# Patient Record
Sex: FEMALE | Race: WHITE | NOT HISPANIC OR LATINO | Employment: FULL TIME | URBAN - METROPOLITAN AREA
[De-identification: names, ages, dates, MRNs, and addresses within clinical notes are randomized per-mention and may not be internally consistent; named-entity substitution may affect disease eponyms.]

---

## 2017-01-03 ENCOUNTER — ANESTHESIA (OUTPATIENT)
Dept: PERIOP | Facility: HOSPITAL | Age: 52
End: 2017-01-03
Payer: COMMERCIAL

## 2017-01-03 ENCOUNTER — HOSPITAL ENCOUNTER (OUTPATIENT)
Facility: HOSPITAL | Age: 52
Setting detail: OUTPATIENT SURGERY
Discharge: HOME | End: 2017-01-03
Attending: SURGERY | Admitting: SURGERY
Payer: COMMERCIAL

## 2017-01-03 ENCOUNTER — HOSPITAL ENCOUNTER (OUTPATIENT)
Dept: NUCLEAR MEDICINE | Facility: HOSPITAL | Age: 52
Discharge: HOME | End: 2017-01-03
Attending: SURGERY
Payer: COMMERCIAL

## 2017-01-03 ENCOUNTER — HOSPITAL ENCOUNTER (OUTPATIENT)
Dept: MAMMOGRAPHY | Facility: HOSPITAL | Age: 52
Discharge: HOME | End: 2017-01-03
Attending: SURGERY
Payer: COMMERCIAL

## 2017-01-03 ENCOUNTER — CONVERSION ENCOUNTER (OUTPATIENT)
Dept: MAMMOGRAPHY | Facility: HOSPITAL | Age: 52
End: 2017-01-03

## 2017-01-03 ENCOUNTER — HOSPITAL ENCOUNTER (OUTPATIENT)
Dept: MAMMOGRAPHY | Facility: HOSPITAL | Age: 52
Setting detail: OUTPATIENT SURGERY
Discharge: HOME | End: 2017-01-03
Payer: COMMERCIAL

## 2017-01-03 VITALS
TEMPERATURE: 97.6 F | RESPIRATION RATE: 16 BRPM | WEIGHT: 119.25 LBS | HEIGHT: 66 IN | OXYGEN SATURATION: 100 % | BODY MASS INDEX: 19.16 KG/M2 | DIASTOLIC BLOOD PRESSURE: 74 MMHG | SYSTOLIC BLOOD PRESSURE: 114 MMHG | HEART RATE: 77 BPM

## 2017-01-03 DIAGNOSIS — C50.412 BILATERAL MALIGNANT NEOPLASM OF UPPER OUTER QUADRANT OF BREAST IN FEMALE (HCC): ICD-10-CM

## 2017-01-03 DIAGNOSIS — C50.412 MALIGNANT NEOPLASM OF UPPER-OUTER QUADRANT OF LEFT FEMALE BREAST (HCC): ICD-10-CM

## 2017-01-03 DIAGNOSIS — C50.411 BILATERAL MALIGNANT NEOPLASM OF UPPER OUTER QUADRANT OF BREAST IN FEMALE (HCC): ICD-10-CM

## 2017-01-03 PROBLEM — C50.419 MALIGNANT NEOPLASM OF UPPER-OUTER QUADRANT OF FEMALE BREAST (HCC): Status: ACTIVE | Noted: 2017-01-03

## 2017-01-03 LAB — EXT PREGNANCY TEST URINE: NEGATIVE

## 2017-01-03 PROCEDURE — 88307 TISSUE EXAM BY PATHOLOGIST: CPT | Performed by: SURGERY

## 2017-01-03 PROCEDURE — 88342 IMHCHEM/IMCYTCHM 1ST ANTB: CPT | Performed by: SURGERY

## 2017-01-03 PROCEDURE — 19281 PERQ DEVICE BREAST 1ST IMAG: CPT

## 2017-01-03 PROCEDURE — 88341 IMHCHEM/IMCYTCHM EA ADD ANTB: CPT | Performed by: SURGERY

## 2017-01-03 PROCEDURE — 81025 URINE PREGNANCY TEST: CPT | Performed by: ANESTHESIOLOGY

## 2017-01-03 PROCEDURE — 78195 LYMPH SYSTEM IMAGING: CPT

## 2017-01-03 PROCEDURE — A9541 TC99M SULFUR COLLOID: HCPCS

## 2017-01-03 RX ORDER — ONDANSETRON 2 MG/ML
4 INJECTION INTRAMUSCULAR; INTRAVENOUS ONCE
Status: DISCONTINUED | OUTPATIENT
Start: 2017-01-03 | End: 2017-01-03 | Stop reason: HOSPADM

## 2017-01-03 RX ORDER — SODIUM CHLORIDE 9 MG/ML
125 INJECTION, SOLUTION INTRAVENOUS CONTINUOUS
Status: DISCONTINUED | OUTPATIENT
Start: 2017-01-03 | End: 2017-01-03 | Stop reason: HOSPADM

## 2017-01-03 RX ORDER — ISOSULFAN BLUE 50 MG/5ML
INJECTION, SOLUTION SUBCUTANEOUS AS NEEDED
Status: DISCONTINUED | OUTPATIENT
Start: 2017-01-03 | End: 2017-01-03 | Stop reason: HOSPADM

## 2017-01-03 RX ORDER — MORPHINE SULFATE 2 MG/ML
2 INJECTION, SOLUTION INTRAMUSCULAR; INTRAVENOUS
Status: DISCONTINUED | OUTPATIENT
Start: 2017-01-03 | End: 2017-01-03 | Stop reason: HOSPADM

## 2017-01-03 RX ORDER — LIDOCAINE WITH 8.4% SOD BICARB 0.9%(10ML)
5 SYRINGE (ML) INJECTION ONCE
Status: DISCONTINUED | OUTPATIENT
Start: 2017-01-03 | End: 2017-01-04 | Stop reason: HOSPADM

## 2017-01-03 RX ORDER — FENTANYL CITRATE/PF 50 MCG/ML
50 SYRINGE (ML) INJECTION
Status: DISCONTINUED | OUTPATIENT
Start: 2017-01-03 | End: 2017-01-03 | Stop reason: HOSPADM

## 2017-01-03 RX ORDER — OXYCODONE HYDROCHLORIDE AND ACETAMINOPHEN 5; 325 MG/1; MG/1
1 TABLET ORAL EVERY 4 HOURS PRN
Status: DISCONTINUED | OUTPATIENT
Start: 2017-01-03 | End: 2017-01-03 | Stop reason: HOSPADM

## 2017-01-03 RX ORDER — DEXAMETHASONE SODIUM PHOSPHATE 4 MG/ML
INJECTION, SOLUTION INTRA-ARTICULAR; INTRALESIONAL; INTRAMUSCULAR; INTRAVENOUS; SOFT TISSUE AS NEEDED
Status: DISCONTINUED | OUTPATIENT
Start: 2017-01-03 | End: 2017-01-03 | Stop reason: SURG

## 2017-01-03 RX ORDER — BUPIVACAINE HYDROCHLORIDE 5 MG/ML
INJECTION, SOLUTION PERINEURAL AS NEEDED
Status: DISCONTINUED | OUTPATIENT
Start: 2017-01-03 | End: 2017-01-03 | Stop reason: HOSPADM

## 2017-01-03 RX ORDER — PROPOFOL 10 MG/ML
INJECTION, EMULSION INTRAVENOUS AS NEEDED
Status: DISCONTINUED | OUTPATIENT
Start: 2017-01-03 | End: 2017-01-03 | Stop reason: SURG

## 2017-01-03 RX ORDER — ROCURONIUM BROMIDE 10 MG/ML
INJECTION, SOLUTION INTRAVENOUS AS NEEDED
Status: DISCONTINUED | OUTPATIENT
Start: 2017-01-03 | End: 2017-01-03 | Stop reason: SURG

## 2017-01-03 RX ORDER — FENTANYL CITRATE 50 UG/ML
INJECTION, SOLUTION INTRAMUSCULAR; INTRAVENOUS AS NEEDED
Status: DISCONTINUED | OUTPATIENT
Start: 2017-01-03 | End: 2017-01-03 | Stop reason: SURG

## 2017-01-03 RX ORDER — MIDAZOLAM HYDROCHLORIDE 1 MG/ML
INJECTION INTRAMUSCULAR; INTRAVENOUS AS NEEDED
Status: DISCONTINUED | OUTPATIENT
Start: 2017-01-03 | End: 2017-01-03 | Stop reason: SURG

## 2017-01-03 RX ORDER — GLYCOPYRROLATE 0.2 MG/ML
INJECTION INTRAMUSCULAR; INTRAVENOUS AS NEEDED
Status: DISCONTINUED | OUTPATIENT
Start: 2017-01-03 | End: 2017-01-03 | Stop reason: SURG

## 2017-01-03 RX ORDER — IBUPROFEN 600 MG/1
600 TABLET ORAL EVERY 6 HOURS PRN
Status: DISCONTINUED | OUTPATIENT
Start: 2017-01-03 | End: 2017-01-03 | Stop reason: HOSPADM

## 2017-01-03 RX ORDER — ONDANSETRON 2 MG/ML
INJECTION INTRAMUSCULAR; INTRAVENOUS AS NEEDED
Status: DISCONTINUED | OUTPATIENT
Start: 2017-01-03 | End: 2017-01-03 | Stop reason: SURG

## 2017-01-03 RX ADMIN — DEXAMETHASONE SODIUM PHOSPHATE 4 MG: 4 INJECTION, SOLUTION INTRAMUSCULAR; INTRAVENOUS at 12:08

## 2017-01-03 RX ADMIN — FENTANYL CITRATE 50 MCG: 50 INJECTION, SOLUTION INTRAMUSCULAR; INTRAVENOUS at 11:57

## 2017-01-03 RX ADMIN — PROPOFOL 200 MG: 10 INJECTION, EMULSION INTRAVENOUS at 11:57

## 2017-01-03 RX ADMIN — FENTANYL CITRATE 50 MCG: 50 INJECTION, SOLUTION INTRAMUSCULAR; INTRAVENOUS at 13:15

## 2017-01-03 RX ADMIN — FENTANYL CITRATE 100 MCG: 50 INJECTION, SOLUTION INTRAMUSCULAR; INTRAVENOUS at 11:54

## 2017-01-03 RX ADMIN — CEFAZOLIN SODIUM 1000 MG: 1 SOLUTION INTRAVENOUS at 12:04

## 2017-01-03 RX ADMIN — NEOSTIGMINE METHYLSULFATE 3 MG: 1 INJECTION INTRAMUSCULAR; INTRAVENOUS; SUBCUTANEOUS at 13:08

## 2017-01-03 RX ADMIN — FENTANYL CITRATE 50 MCG: 50 INJECTION, SOLUTION INTRAMUSCULAR; INTRAVENOUS at 12:55

## 2017-01-03 RX ADMIN — TRIMETHOBENZAMIDE HYDROCHLORIDE 200 MG: 100 INJECTION INTRAMUSCULAR at 14:58

## 2017-01-03 RX ADMIN — ROCURONIUM BROMIDE 30 MG: 10 INJECTION, SOLUTION INTRAVENOUS at 11:57

## 2017-01-03 RX ADMIN — LIDOCAINE HYDROCHLORIDE 40 MG: 20 INJECTION, SOLUTION INTRAVENOUS at 11:57

## 2017-01-03 RX ADMIN — SODIUM CHLORIDE 125 ML/HR: 0.9 INJECTION, SOLUTION INTRAVENOUS at 08:47

## 2017-01-03 RX ADMIN — MIDAZOLAM HYDROCHLORIDE 2 MG: 1 INJECTION, SOLUTION INTRAMUSCULAR; INTRAVENOUS at 11:54

## 2017-01-03 RX ADMIN — GLYCOPYRROLATE 0.6 MG: 0.2 INJECTION, SOLUTION INTRAMUSCULAR; INTRAVENOUS at 13:08

## 2017-01-03 RX ADMIN — IBUPROFEN 600 MG: 600 TABLET ORAL at 15:49

## 2017-01-03 RX ADMIN — ONDANSETRON HYDROCHLORIDE 4 MG: 2 INJECTION, SOLUTION INTRAVENOUS at 12:08

## 2017-01-03 RX ADMIN — SODIUM CHLORIDE: 0.9 INJECTION, SOLUTION INTRAVENOUS at 12:35

## 2017-01-03 RX ADMIN — FENTANYL CITRATE 50 MCG: 50 INJECTION INTRAMUSCULAR; INTRAVENOUS at 13:37

## 2017-01-04 ENCOUNTER — GENERIC CONVERSION - ENCOUNTER (OUTPATIENT)
Dept: OTHER | Facility: OTHER | Age: 52
End: 2017-01-04

## 2017-01-06 ENCOUNTER — GENERIC CONVERSION - ENCOUNTER (OUTPATIENT)
Dept: OTHER | Facility: OTHER | Age: 52
End: 2017-01-06

## 2017-01-11 ENCOUNTER — GENERIC CONVERSION - ENCOUNTER (OUTPATIENT)
Dept: OTHER | Facility: OTHER | Age: 52
End: 2017-01-11

## 2017-01-17 ENCOUNTER — ALLSCRIPTS OFFICE VISIT (OUTPATIENT)
Dept: OTHER | Facility: OTHER | Age: 52
End: 2017-01-17

## 2017-01-18 ENCOUNTER — GENERIC CONVERSION - ENCOUNTER (OUTPATIENT)
Dept: OTHER | Facility: OTHER | Age: 52
End: 2017-01-18

## 2017-01-26 ENCOUNTER — GENERIC CONVERSION - ENCOUNTER (OUTPATIENT)
Dept: OTHER | Facility: OTHER | Age: 52
End: 2017-01-26

## 2017-01-26 ENCOUNTER — APPOINTMENT (OUTPATIENT)
Dept: RADIATION ONCOLOGY | Facility: HOSPITAL | Age: 52
End: 2017-01-26
Payer: COMMERCIAL

## 2017-01-26 ENCOUNTER — ALLSCRIPTS OFFICE VISIT (OUTPATIENT)
Dept: OTHER | Facility: OTHER | Age: 52
End: 2017-01-26

## 2017-01-26 PROCEDURE — 99214 OFFICE O/P EST MOD 30 MIN: CPT | Performed by: RADIOLOGY

## 2017-02-17 ENCOUNTER — ALLSCRIPTS OFFICE VISIT (OUTPATIENT)
Dept: OTHER | Facility: OTHER | Age: 52
End: 2017-02-17

## 2017-02-22 ENCOUNTER — APPOINTMENT (OUTPATIENT)
Dept: LAB | Facility: HOSPITAL | Age: 52
End: 2017-02-22
Attending: RADIOLOGY
Payer: COMMERCIAL

## 2017-02-22 ENCOUNTER — TRANSCRIBE ORDERS (OUTPATIENT)
Dept: LAB | Facility: HOSPITAL | Age: 52
End: 2017-02-22

## 2017-02-22 DIAGNOSIS — C50.412 MALIGNANT NEOPLASM OF UPPER-OUTER QUADRANT OF LEFT FEMALE BREAST (HCC): Primary | ICD-10-CM

## 2017-02-22 DIAGNOSIS — C50.412 MALIGNANT NEOPLASM OF UPPER-OUTER QUADRANT OF LEFT FEMALE BREAST (HCC): ICD-10-CM

## 2017-02-22 LAB — HCG SERPL QL: NEGATIVE

## 2017-02-22 PROCEDURE — 84703 CHORIONIC GONADOTROPIN ASSAY: CPT

## 2017-02-22 PROCEDURE — 36415 COLL VENOUS BLD VENIPUNCTURE: CPT

## 2017-03-01 ENCOUNTER — GENERIC CONVERSION - ENCOUNTER (OUTPATIENT)
Dept: OTHER | Facility: OTHER | Age: 52
End: 2017-03-01

## 2017-03-01 ENCOUNTER — APPOINTMENT (OUTPATIENT)
Dept: RADIATION ONCOLOGY | Facility: HOSPITAL | Age: 52
End: 2017-03-01
Attending: RADIOLOGY
Payer: COMMERCIAL

## 2017-03-01 PROCEDURE — 77332 RADIATION TREATMENT AID(S): CPT | Performed by: RADIOLOGY

## 2017-03-01 PROCEDURE — 77334 RADIATION TREATMENT AID(S): CPT | Performed by: RADIOLOGY

## 2017-03-06 ENCOUNTER — APPOINTMENT (OUTPATIENT)
Dept: RADIATION ONCOLOGY | Facility: HOSPITAL | Age: 52
End: 2017-03-06
Payer: COMMERCIAL

## 2017-03-06 PROCEDURE — 77334 RADIATION TREATMENT AID(S): CPT | Performed by: RADIOLOGY

## 2017-03-06 PROCEDURE — 77295 3-D RADIOTHERAPY PLAN: CPT | Performed by: RADIOLOGY

## 2017-03-06 PROCEDURE — 77300 RADIATION THERAPY DOSE PLAN: CPT | Performed by: RADIOLOGY

## 2017-03-09 ENCOUNTER — GENERIC CONVERSION - ENCOUNTER (OUTPATIENT)
Dept: OTHER | Facility: OTHER | Age: 52
End: 2017-03-09

## 2017-03-10 PROCEDURE — 77280 THER RAD SIMULAJ FIELD SMPL: CPT | Performed by: RADIOLOGY

## 2017-03-13 PROCEDURE — 77412 RADIATION TX DELIVERY LVL 3: CPT | Performed by: RADIOLOGY

## 2017-03-13 PROCEDURE — 77331 SPECIAL RADIATION DOSIMETRY: CPT | Performed by: RADIOLOGY

## 2017-03-15 PROCEDURE — 77412 RADIATION TX DELIVERY LVL 3: CPT | Performed by: RADIOLOGY

## 2017-03-16 PROCEDURE — 77412 RADIATION TX DELIVERY LVL 3: CPT | Performed by: RADIOLOGY

## 2017-03-17 PROCEDURE — 77412 RADIATION TX DELIVERY LVL 3: CPT | Performed by: RADIOLOGY

## 2017-03-20 PROCEDURE — 77336 RADIATION PHYSICS CONSULT: CPT | Performed by: RADIOLOGY

## 2017-03-20 PROCEDURE — 77417 THER RADIOLOGY PORT IMAGE(S): CPT | Performed by: RADIOLOGY

## 2017-03-20 PROCEDURE — 77412 RADIATION TX DELIVERY LVL 3: CPT | Performed by: RADIOLOGY

## 2017-03-21 PROCEDURE — 77412 RADIATION TX DELIVERY LVL 3: CPT | Performed by: RADIOLOGY

## 2017-03-22 PROCEDURE — 77412 RADIATION TX DELIVERY LVL 3: CPT | Performed by: RADIOLOGY

## 2017-03-23 PROCEDURE — 77412 RADIATION TX DELIVERY LVL 3: CPT | Performed by: RADIOLOGY

## 2017-03-24 PROCEDURE — 77412 RADIATION TX DELIVERY LVL 3: CPT | Performed by: RADIOLOGY

## 2017-03-27 ENCOUNTER — TRANSCRIBE ORDERS (OUTPATIENT)
Dept: OTHER | Facility: HOSPITAL | Age: 52
End: 2017-03-27

## 2017-03-27 DIAGNOSIS — C50.412 MALIGNANT NEOPLASM OF UPPER-OUTER QUADRANT OF LEFT FEMALE BREAST (HCC): Primary | ICD-10-CM

## 2017-03-27 PROCEDURE — 77412 RADIATION TX DELIVERY LVL 3: CPT | Performed by: RADIOLOGY

## 2017-03-27 PROCEDURE — 77336 RADIATION PHYSICS CONSULT: CPT | Performed by: RADIOLOGY

## 2017-03-28 DIAGNOSIS — C50.412 MALIGNANT NEOPLASM OF UPPER-OUTER QUADRANT OF LEFT FEMALE BREAST (HCC): ICD-10-CM

## 2017-03-28 PROCEDURE — 77417 THER RADIOLOGY PORT IMAGE(S): CPT | Performed by: RADIOLOGY

## 2017-03-28 PROCEDURE — 77412 RADIATION TX DELIVERY LVL 3: CPT | Performed by: RADIOLOGY

## 2017-03-29 ENCOUNTER — APPOINTMENT (OUTPATIENT)
Dept: LAB | Facility: HOSPITAL | Age: 52
End: 2017-03-29
Attending: RADIOLOGY
Payer: COMMERCIAL

## 2017-03-29 DIAGNOSIS — C50.412 MALIGNANT NEOPLASM OF UPPER-OUTER QUADRANT OF LEFT FEMALE BREAST (HCC): ICD-10-CM

## 2017-03-29 LAB
ERYTHROCYTE [DISTWIDTH] IN BLOOD BY AUTOMATED COUNT: 13.7 % (ref 11.6–15.1)
HCT VFR BLD AUTO: 39.8 % (ref 34.8–46.1)
HGB BLD-MCNC: 12.8 G/DL (ref 11.5–15.4)
MCH RBC QN AUTO: 29.4 PG (ref 26.8–34.3)
MCHC RBC AUTO-ENTMCNC: 32.2 G/DL (ref 31.4–37.4)
MCV RBC AUTO: 92 FL (ref 82–98)
PLATELET # BLD AUTO: 270 THOUSANDS/UL (ref 149–390)
PMV BLD AUTO: 12.1 FL (ref 8.9–12.7)
RBC # BLD AUTO: 4.35 MILLION/UL (ref 3.81–5.12)
WBC # BLD AUTO: 6.83 THOUSAND/UL (ref 4.31–10.16)

## 2017-03-29 PROCEDURE — 77412 RADIATION TX DELIVERY LVL 3: CPT | Performed by: RADIOLOGY

## 2017-03-29 PROCEDURE — 36415 COLL VENOUS BLD VENIPUNCTURE: CPT

## 2017-03-29 PROCEDURE — 85027 COMPLETE CBC AUTOMATED: CPT

## 2017-03-30 ENCOUNTER — HOSPITAL ENCOUNTER (OUTPATIENT)
Dept: RADIOLOGY | Facility: HOSPITAL | Age: 52
Discharge: HOME | End: 2017-03-30
Attending: RADIOLOGY
Payer: COMMERCIAL

## 2017-03-30 DIAGNOSIS — C50.412 MALIGNANT NEOPLASM OF UPPER-OUTER QUADRANT OF LEFT FEMALE BREAST (HCC): ICD-10-CM

## 2017-03-30 PROCEDURE — 77290 THER RAD SIMULAJ FIELD CPLX: CPT | Performed by: RADIOLOGY

## 2017-03-30 PROCEDURE — 77334 RADIATION TREATMENT AID(S): CPT | Performed by: RADIOLOGY

## 2017-03-30 PROCEDURE — 77412 RADIATION TX DELIVERY LVL 3: CPT | Performed by: RADIOLOGY

## 2017-03-30 PROCEDURE — 77014 HB CT SCAN FOR THERAPY GUIDE: CPT

## 2017-03-31 PROCEDURE — 77412 RADIATION TX DELIVERY LVL 3: CPT | Performed by: RADIOLOGY

## 2017-04-03 ENCOUNTER — APPOINTMENT (OUTPATIENT)
Dept: RADIATION ONCOLOGY | Facility: HOSPITAL | Age: 52
End: 2017-04-03
Payer: COMMERCIAL

## 2017-04-03 DIAGNOSIS — C50.412 MALIGNANT NEOPLASM OF UPPER-OUTER QUADRANT OF LEFT FEMALE BREAST (HCC): ICD-10-CM

## 2017-04-03 PROCEDURE — 77412 RADIATION TX DELIVERY LVL 3: CPT | Performed by: RADIOLOGY

## 2017-04-03 PROCEDURE — 77336 RADIATION PHYSICS CONSULT: CPT | Performed by: RADIOLOGY

## 2017-04-04 PROCEDURE — 77412 RADIATION TX DELIVERY LVL 3: CPT | Performed by: RADIOLOGY

## 2017-04-04 PROCEDURE — 77417 THER RADIOLOGY PORT IMAGE(S): CPT | Performed by: RADIOLOGY

## 2017-04-05 PROCEDURE — 77412 RADIATION TX DELIVERY LVL 3: CPT | Performed by: RADIOLOGY

## 2017-04-06 PROCEDURE — 77412 RADIATION TX DELIVERY LVL 3: CPT | Performed by: RADIOLOGY

## 2017-04-07 PROCEDURE — 77412 RADIATION TX DELIVERY LVL 3: CPT | Performed by: RADIOLOGY

## 2017-04-10 PROCEDURE — 77336 RADIATION PHYSICS CONSULT: CPT | Performed by: RADIOLOGY

## 2017-04-10 PROCEDURE — 77412 RADIATION TX DELIVERY LVL 3: CPT | Performed by: RADIOLOGY

## 2017-04-11 PROCEDURE — 77412 RADIATION TX DELIVERY LVL 3: CPT | Performed by: RADIOLOGY

## 2017-04-11 PROCEDURE — 77417 THER RADIOLOGY PORT IMAGE(S): CPT | Performed by: RADIOLOGY

## 2017-04-12 LAB — SCAN RESULT: NORMAL

## 2017-04-12 PROCEDURE — 77412 RADIATION TX DELIVERY LVL 3: CPT | Performed by: RADIOLOGY

## 2017-04-13 PROCEDURE — 77412 RADIATION TX DELIVERY LVL 3: CPT | Performed by: RADIOLOGY

## 2017-04-14 PROCEDURE — 77300 RADIATION THERAPY DOSE PLAN: CPT | Performed by: RADIOLOGY

## 2017-04-14 PROCEDURE — 77334 RADIATION TREATMENT AID(S): CPT | Performed by: RADIOLOGY

## 2017-04-14 PROCEDURE — 77295 3-D RADIOTHERAPY PLAN: CPT | Performed by: RADIOLOGY

## 2017-04-14 PROCEDURE — 77412 RADIATION TX DELIVERY LVL 3: CPT | Performed by: RADIOLOGY

## 2017-04-17 PROCEDURE — 77412 RADIATION TX DELIVERY LVL 3: CPT | Performed by: RADIOLOGY

## 2017-04-17 PROCEDURE — 77336 RADIATION PHYSICS CONSULT: CPT | Performed by: RADIOLOGY

## 2017-04-18 PROCEDURE — 77280 THER RAD SIMULAJ FIELD SMPL: CPT | Performed by: RADIOLOGY

## 2017-04-18 PROCEDURE — 77331 SPECIAL RADIATION DOSIMETRY: CPT | Performed by: RADIOLOGY

## 2017-04-18 PROCEDURE — 77412 RADIATION TX DELIVERY LVL 3: CPT | Performed by: RADIOLOGY

## 2017-04-19 PROCEDURE — 77412 RADIATION TX DELIVERY LVL 3: CPT | Performed by: RADIOLOGY

## 2017-04-20 PROCEDURE — 77412 RADIATION TX DELIVERY LVL 3: CPT | Performed by: RADIOLOGY

## 2017-04-21 PROCEDURE — 77412 RADIATION TX DELIVERY LVL 3: CPT | Performed by: RADIOLOGY

## 2017-04-24 PROCEDURE — 77336 RADIATION PHYSICS CONSULT: CPT | Performed by: RADIOLOGY

## 2017-04-24 PROCEDURE — 77412 RADIATION TX DELIVERY LVL 3: CPT | Performed by: RADIOLOGY

## 2017-05-01 ENCOUNTER — LAB REQUISITION (OUTPATIENT)
Dept: LAB | Facility: HOSPITAL | Age: 52
End: 2017-05-01
Payer: COMMERCIAL

## 2017-05-01 ENCOUNTER — ALLSCRIPTS OFFICE VISIT (OUTPATIENT)
Dept: OTHER | Facility: OTHER | Age: 52
End: 2017-05-01

## 2017-05-01 DIAGNOSIS — Z01.419 ENCOUNTER FOR GYNECOLOGICAL EXAMINATION WITHOUT ABNORMAL FINDING: ICD-10-CM

## 2017-05-01 LAB
BACTERIA UR QL AUTO: NORMAL
CLUE CELL (HISTORICAL): 0
HYPHAL YEAST (HISTORICAL): 0
PH UR STRIP.AUTO: 4.5 [PH]
TRICHOMONAS (HISTORICAL): 0
YEAST (HISTORICAL): 0

## 2017-05-01 PROCEDURE — 87624 HPV HI-RISK TYP POOLED RSLT: CPT | Performed by: OBSTETRICS & GYNECOLOGY

## 2017-05-01 PROCEDURE — G0145 SCR C/V CYTO,THINLAYER,RESCR: HCPCS | Performed by: OBSTETRICS & GYNECOLOGY

## 2017-05-04 LAB — HPV RRNA GENITAL QL NAA+PROBE: NORMAL

## 2017-05-08 ENCOUNTER — TRANSCRIBE ORDERS (OUTPATIENT)
Dept: ADMINISTRATIVE | Facility: HOSPITAL | Age: 52
End: 2017-05-08

## 2017-05-08 ENCOUNTER — ALLSCRIPTS OFFICE VISIT (OUTPATIENT)
Dept: OTHER | Facility: OTHER | Age: 52
End: 2017-05-08

## 2017-05-08 ENCOUNTER — GENERIC CONVERSION - ENCOUNTER (OUTPATIENT)
Dept: OTHER | Facility: OTHER | Age: 52
End: 2017-05-08

## 2017-05-08 DIAGNOSIS — C50.412 MALIGNANT NEOPLASM OF UPPER-OUTER QUADRANT OF LEFT FEMALE BREAST (HCC): Primary | ICD-10-CM

## 2017-05-08 LAB
LAB AP GYN PRIMARY INTERPRETATION: NORMAL
Lab: NORMAL

## 2017-05-11 ENCOUNTER — ALLSCRIPTS OFFICE VISIT (OUTPATIENT)
Dept: OTHER | Facility: OTHER | Age: 52
End: 2017-05-11

## 2017-05-11 DIAGNOSIS — C50.412 MALIGNANT NEOPLASM OF UPPER-OUTER QUADRANT OF LEFT FEMALE BREAST (HCC): ICD-10-CM

## 2017-05-11 DIAGNOSIS — Z79.810 LONG TERM CURRENT USE OF SELECTIVE ESTROGEN RECEPTOR MODULATORS (SERMS): ICD-10-CM

## 2017-05-15 ENCOUNTER — GENERIC CONVERSION - ENCOUNTER (OUTPATIENT)
Dept: OTHER | Facility: OTHER | Age: 52
End: 2017-05-15

## 2017-05-15 ENCOUNTER — APPOINTMENT (OUTPATIENT)
Dept: RADIATION ONCOLOGY | Facility: HOSPITAL | Age: 52
End: 2017-05-15
Attending: RADIOLOGY
Payer: COMMERCIAL

## 2017-05-15 PROCEDURE — 99213 OFFICE O/P EST LOW 20 MIN: CPT | Performed by: RADIOLOGY

## 2017-08-07 ENCOUNTER — HOSPITAL ENCOUNTER (OUTPATIENT)
Dept: MAMMOGRAPHY | Facility: CLINIC | Age: 52
Discharge: HOME | End: 2017-08-07
Payer: COMMERCIAL

## 2017-08-07 DIAGNOSIS — C50.412 MALIGNANT NEOPLASM OF UPPER-OUTER QUADRANT OF LEFT FEMALE BREAST (HCC): ICD-10-CM

## 2017-08-07 PROCEDURE — G0279 TOMOSYNTHESIS, MAMMO: HCPCS

## 2017-08-07 PROCEDURE — G0204 DX MAMMO INCL CAD BI: HCPCS

## 2017-08-11 ENCOUNTER — TRANSCRIBE ORDERS (OUTPATIENT)
Dept: LAB | Facility: HOSPITAL | Age: 52
End: 2017-08-11

## 2017-08-11 ENCOUNTER — APPOINTMENT (OUTPATIENT)
Dept: LAB | Facility: HOSPITAL | Age: 52
End: 2017-08-11
Payer: COMMERCIAL

## 2017-08-11 DIAGNOSIS — Z79.810 LONG TERM CURRENT USE OF SELECTIVE ESTROGEN RECEPTOR MODULATORS (SERMS): ICD-10-CM

## 2017-08-11 DIAGNOSIS — C50.412 MALIGNANT NEOPLASM OF UPPER-OUTER QUADRANT OF LEFT FEMALE BREAST (HCC): ICD-10-CM

## 2017-08-11 LAB
ALBUMIN SERPL BCP-MCNC: 3.6 G/DL (ref 3.5–5)
ALP SERPL-CCNC: 52 U/L (ref 46–116)
ALT SERPL W P-5'-P-CCNC: 24 U/L (ref 12–78)
ANION GAP SERPL CALCULATED.3IONS-SCNC: 8 MMOL/L (ref 4–13)
AST SERPL W P-5'-P-CCNC: 20 U/L (ref 5–45)
BASOPHILS # BLD AUTO: 0.04 THOUSANDS/ΜL (ref 0–0.1)
BASOPHILS NFR BLD AUTO: 1 % (ref 0–1)
BILIRUB SERPL-MCNC: 0.68 MG/DL (ref 0.2–1)
BUN SERPL-MCNC: 20 MG/DL (ref 5–25)
CALCIUM SERPL-MCNC: 10.1 MG/DL (ref 8.3–10.1)
CANCER AG27-29 SERPL-ACNC: 22 U/ML (ref 0–42.3)
CHLORIDE SERPL-SCNC: 102 MMOL/L (ref 100–108)
CO2 SERPL-SCNC: 29 MMOL/L (ref 21–32)
CREAT SERPL-MCNC: 0.8 MG/DL (ref 0.6–1.3)
EOSINOPHIL # BLD AUTO: 0.09 THOUSAND/ΜL (ref 0–0.61)
EOSINOPHIL NFR BLD AUTO: 1 % (ref 0–6)
ERYTHROCYTE [DISTWIDTH] IN BLOOD BY AUTOMATED COUNT: 13.6 % (ref 11.6–15.1)
GFR SERPL CREATININE-BSD FRML MDRD: 86 ML/MIN/1.73SQ M
GLUCOSE SERPL-MCNC: 96 MG/DL (ref 65–140)
HCT VFR BLD AUTO: 39 % (ref 34.8–46.1)
HGB BLD-MCNC: 12.9 G/DL (ref 11.5–15.4)
LYMPHOCYTES # BLD AUTO: 1.55 THOUSANDS/ΜL (ref 0.6–4.47)
LYMPHOCYTES NFR BLD AUTO: 21 % (ref 14–44)
MCH RBC QN AUTO: 30.2 PG (ref 26.8–34.3)
MCHC RBC AUTO-ENTMCNC: 33.1 G/DL (ref 31.4–37.4)
MCV RBC AUTO: 91 FL (ref 82–98)
MONOCYTES # BLD AUTO: 0.96 THOUSAND/ΜL (ref 0.17–1.22)
MONOCYTES NFR BLD AUTO: 13 % (ref 4–12)
NEUTROPHILS # BLD AUTO: 4.74 THOUSANDS/ΜL (ref 1.85–7.62)
NEUTS SEG NFR BLD AUTO: 64 % (ref 43–75)
NRBC BLD AUTO-RTO: 0 /100 WBCS
PLATELET # BLD AUTO: 270 THOUSANDS/UL (ref 149–390)
PMV BLD AUTO: 12.3 FL (ref 8.9–12.7)
POTASSIUM SERPL-SCNC: 3.9 MMOL/L (ref 3.5–5.3)
PROT SERPL-MCNC: 7.3 G/DL (ref 6.4–8.2)
RBC # BLD AUTO: 4.27 MILLION/UL (ref 3.81–5.12)
SODIUM SERPL-SCNC: 139 MMOL/L (ref 136–145)
WBC # BLD AUTO: 7.41 THOUSAND/UL (ref 4.31–10.16)

## 2017-08-11 PROCEDURE — 86300 IMMUNOASSAY TUMOR CA 15-3: CPT

## 2017-08-11 PROCEDURE — 36415 COLL VENOUS BLD VENIPUNCTURE: CPT

## 2017-08-11 PROCEDURE — 80053 COMPREHEN METABOLIC PANEL: CPT

## 2017-08-11 PROCEDURE — 85025 COMPLETE CBC W/AUTO DIFF WBC: CPT

## 2017-08-14 ENCOUNTER — ALLSCRIPTS OFFICE VISIT (OUTPATIENT)
Dept: OTHER | Facility: OTHER | Age: 52
End: 2017-08-14

## 2017-10-31 ENCOUNTER — APPOINTMENT (OUTPATIENT)
Dept: LAB | Facility: HOSPITAL | Age: 52
End: 2017-10-31
Attending: INTERNAL MEDICINE
Payer: COMMERCIAL

## 2017-10-31 ENCOUNTER — TRANSCRIBE ORDERS (OUTPATIENT)
Dept: LAB | Facility: HOSPITAL | Age: 52
End: 2017-10-31

## 2017-10-31 DIAGNOSIS — C50.412 MALIGNANT NEOPLASM OF UPPER-OUTER QUADRANT OF LEFT FEMALE BREAST (HCC): ICD-10-CM

## 2017-10-31 LAB
ALBUMIN SERPL BCP-MCNC: 3.8 G/DL (ref 3.5–5)
ALP SERPL-CCNC: 49 U/L (ref 46–116)
ALT SERPL W P-5'-P-CCNC: 23 U/L (ref 12–78)
ANION GAP SERPL CALCULATED.3IONS-SCNC: 7 MMOL/L (ref 4–13)
AST SERPL W P-5'-P-CCNC: 20 U/L (ref 5–45)
BASOPHILS # BLD AUTO: 0.03 THOUSANDS/ΜL (ref 0–0.1)
BASOPHILS NFR BLD AUTO: 0 % (ref 0–1)
BILIRUB SERPL-MCNC: 0.31 MG/DL (ref 0.2–1)
BUN SERPL-MCNC: 17 MG/DL (ref 5–25)
CALCIUM SERPL-MCNC: 9 MG/DL (ref 8.3–10.1)
CHLORIDE SERPL-SCNC: 105 MMOL/L (ref 100–108)
CO2 SERPL-SCNC: 26 MMOL/L (ref 21–32)
CREAT SERPL-MCNC: 0.72 MG/DL (ref 0.6–1.3)
EOSINOPHIL # BLD AUTO: 0.07 THOUSAND/ΜL (ref 0–0.61)
EOSINOPHIL NFR BLD AUTO: 1 % (ref 0–6)
ERYTHROCYTE [DISTWIDTH] IN BLOOD BY AUTOMATED COUNT: 13.9 % (ref 11.6–15.1)
GFR SERPL CREATININE-BSD FRML MDRD: 98 ML/MIN/1.73SQ M
GLUCOSE SERPL-MCNC: 80 MG/DL (ref 65–140)
HCT VFR BLD AUTO: 38.6 % (ref 34.8–46.1)
HGB BLD-MCNC: 12.8 G/DL (ref 11.5–15.4)
LYMPHOCYTES # BLD AUTO: 1.66 THOUSANDS/ΜL (ref 0.6–4.47)
LYMPHOCYTES NFR BLD AUTO: 21 % (ref 14–44)
MCH RBC QN AUTO: 30 PG (ref 26.8–34.3)
MCHC RBC AUTO-ENTMCNC: 33.2 G/DL (ref 31.4–37.4)
MCV RBC AUTO: 91 FL (ref 82–98)
MONOCYTES # BLD AUTO: 0.77 THOUSAND/ΜL (ref 0.17–1.22)
MONOCYTES NFR BLD AUTO: 10 % (ref 4–12)
NEUTROPHILS # BLD AUTO: 5.47 THOUSANDS/ΜL (ref 1.85–7.62)
NEUTS SEG NFR BLD AUTO: 68 % (ref 43–75)
NRBC BLD AUTO-RTO: 0 /100 WBCS
PLATELET # BLD AUTO: 253 THOUSANDS/UL (ref 149–390)
PMV BLD AUTO: 12.2 FL (ref 8.9–12.7)
POTASSIUM SERPL-SCNC: 3.9 MMOL/L (ref 3.5–5.3)
PROT SERPL-MCNC: 7.3 G/DL (ref 6.4–8.2)
RBC # BLD AUTO: 4.26 MILLION/UL (ref 3.81–5.12)
SODIUM SERPL-SCNC: 138 MMOL/L (ref 136–145)
WBC # BLD AUTO: 8.02 THOUSAND/UL (ref 4.31–10.16)

## 2017-10-31 PROCEDURE — 80053 COMPREHEN METABOLIC PANEL: CPT

## 2017-10-31 PROCEDURE — 36415 COLL VENOUS BLD VENIPUNCTURE: CPT

## 2017-10-31 PROCEDURE — 85025 COMPLETE CBC W/AUTO DIFF WBC: CPT

## 2017-10-31 PROCEDURE — 86300 IMMUNOASSAY TUMOR CA 15-3: CPT

## 2017-11-01 LAB — CANCER AG27-29 SERPL-ACNC: 14.3 U/ML (ref 0–42.3)

## 2017-11-06 ENCOUNTER — ALLSCRIPTS OFFICE VISIT (OUTPATIENT)
Dept: OTHER | Facility: OTHER | Age: 52
End: 2017-11-06

## 2017-11-06 DIAGNOSIS — C50.412 MALIGNANT NEOPLASM OF UPPER-OUTER QUADRANT OF LEFT FEMALE BREAST (HCC): ICD-10-CM

## 2017-11-07 NOTE — PROGRESS NOTES
Assessment  1  Malignant neoplasm of upper-outer quadrant of left female breast (174 4) (T79 706)   2  History of Radiation Therapy   3  Use of tamoxifen (Nolvadex) (V07 51) (Z84 254)    Plan  Malignant neoplasm of upper-outer quadrant of left female breast    · Follow-up visit in 6 months Evaluation and Treatment  Follow-up  Status: Hold For -  Scheduling  Requested for: 58UND9127   Ordered; For: Malignant neoplasm of upper-outer quadrant of left female breast; Ordered By: Vineet Cannon Performed:  Due: 39BXZ3947    Discussion/Summary  49 yo female s/p left breast conservation  She is currently on tamoxifen and reports some mood changes  BRIANA based on exam today or recent mammogram  I will see her again in six months or sooner should the need arise  Chief Complaint  Chief Complaint Free Text Note Form: Pt is here for her 6 month breast follow-up  new complaints at this time  imagin17 bashir dx 3d mammo (B2)  Priscilla Chew ,Dr Libertad Jewell and Dr Lida Wheat  History of Present Illness  Diagnosis and Staging: IDC left breast 100%, [id number]   Treatment History: 1/3/17 left lumpectomy, SLNB  Current Therapy: gipson   Disease Status: briana   Interval History: none      Review of Systems  Complete Female ROS SurgOnc:   Constitutional: The patient denies new or recent history of general fatigue, no recent weight loss, no change in appetite  Eyes: No complaints of visual problems, no scleral icterus  ENT: no complaints of ear pain, no hoarseness, no difficulty swallowing,-- no tinnitus-- and-- no new masses in head, oral cavity, or neck  Cardiovascular: No complaints of chest pain, no palpitations, no ankle edema  Respiratory: No complaints of shortness of breath, no cough  Gastrointestinal: No complaints of jaundice, no bloody stools, no pale stools  Genitourinary: No complaints of dysuria, no hematuria, no nocturia, no frequent urination, no urethral discharge     Musculoskeletal: No complaints of weakness, paralysis, joint stiffness or arthralgias,  Integumentary: No complaints of rash, no new lesions  Neurological: No complaints of convulsions, no seizures, no dizziness  Hematologic/Lymphatic: No complaints of easy bruising  Active Problems  1  Abdominal pain, LLQ (left lower quadrant) (789 04) (R10 32)   2  Discharge of vagina (623 5) (N89 8)   3  Encounter for gynecological examination ( 31) (Z01 419)   4  Environmental and seasonal allergies (477 8) (J30 89)   5  Inconclusive mammography due to dense breasts (793 82) (R92 2)   6  Lower back pain (724 2) (M54 5)   7  Malignant neoplasm of upper-outer quadrant of left female breast (174 4) (C50 412)   8  Premenopausal patient (627 2) (N95 9)   9  Screening for breast cancer (V76 10) (Z12 31)   10  Use of tamoxifen (Nolvadex) (V07 51) (Z79 810)   11  Vaginal irritation (623 9) (N89 8)   12  Visit for routine gyn exam () (Z01 419)   13  Vulvar burning (625 9) (N94 89)   14  Vulvar irritation (624 8) (N90 89)    Past Medical History  1  History of Anxiety (300 00) (F41 9)   2  History of BRCA negative (V82 71) (Z13 71)   3  History of pregnancy (V13 29)   4  History of Menarche (V21 8)   5  History of Radiation Therapy   6  History of Uses birth control (V25 9) (Z30 9)    Surgical History  1  History of Appendectomy   · 25 years ago   2  History of Biopsy Breast Percutaneous Needle Core   · 16   3  History of Breast Surgery Lumpectomy   · 17   4  History of [place] Lymph Node Biopsy   · 17  Surgical History Reviewed: The surgical history was reviewed and updated today  Family History  Mother    1  Family history of lung cancer (V16 1) (Z80 1)   2  Patient's mother is  (V24 11) (Z80 80)  Father    3  Family history of lung cancer (V16 1) (Z80 1)   4  Patient's father is  (V24 11) (Z80 80)  Sister    5  Family history of    6   Family history of malignant neoplasm of breast (V16 3) (Z80 3)  Paternal Aunt    7  Family history of malignant neoplasm of breast (V16 3) (Z80 3)  Family History Reviewed: The family history was reviewed and updated today  Social History   · Alcohol use (V49 89) (Z78 9)   · Currently sexually active   · Exercises 5 to 6 times per week (V49 89) (Z78 9)   · Full-time employment   · Lives with spouse   ·    · Never a smoker   · No caffeine use   · Occasional cigarette smoker (305 1) (Z72 0)   · Occupation   · Two children  Social History Reviewed: The social history was reviewed and updated today  The social history was reviewed and is unchanged  Current Meds   1  ALPRAZolam 0 25 MG Oral Tablet; Takes prn; Therapy: (Recorded:23Nov2016) to Recorded   2  Calcium 500 MG TABS; TAKE 1 TABLET DAILY; Therapy: (Recorded:23Nov2016) to Recorded   3  Claritin TABS; TAKE 1 TABLET DAILY; Therapy: (Recorded:84Oiz8432) to Recorded   4  Multi-Vitamin TABS; TAKE 1 TABLET DAILY; Therapy: (Recorded:23Nov2016) to Recorded   5  Tamoxifen Citrate 20 MG Oral Tablet; TAKE 1 TABLET DAILY; Therapy: 30RYY5520 to (Evaluate:22Pls6501)  Requested for: 69ELK2871; Last   Rx:85Xns2580 Ordered  Medication List Reviewed: The medication list was reviewed and updated today  Allergies  1  No Known Drug Allergies  2  Other    Vitals  Vital Signs    Recorded: 09XOA9406 08:46AM   Temperature 97 8 F   Heart Rate 76   Respiration 14   Systolic 758   Diastolic 68   Height 5 ft 6 in   Weight 122 lb    BMI Calculated 19 69   BSA Calculated 1 62   O2 Saturation 99     Physical Exam    Constitutional: General appearance: The Patient is well-developed, well-nourished female who appears her stated age in no acute distress  She is pleasant and talkative  Chest: The lungs are clear to auscultation  Cardiac: Heart is regular rate  Abdomen: There is no evidence of hepatosplenomegaly  Extremities: There is no edema      Neuro: Grossly nonfocal  -- Orientation to person, place and time: Normal  -- Mood and affect: Normal     Lymphatic: no evidence of cervical adenopathy bilaterally  -- no evidence of axillary adenopathy bilaterally  Skin: Examination of Breast: Abnormal   Breast: Examination of both breasts revealed fibrocystic changes  Examination of the right breast revealed no erythema,-- no swelling-- and-- no tenderness  Examination of the left breast revealed lumpectomy, but-- no erythema,-- no swelling-- and-- no tenderness  Nipples appeared normal No discharge was noted from the nipples  No breast masses were palpable  Axillary nodes: no enlarged nodes  Results/Data  Diagnostic Studies Reviewed Surg Onc:   X-ray Review 8/7/17 bilateral 3d diagnostic mammogram benign; category 4  Future Appointments    Date/Time Provider Specialty Site   11/13/2017 10:00 AM Rosmery Yarbrough MD Hematology Oncology [facility]   11/20/2017 11:30 AM Lindsey Natino MD Hematology Oncology [facility]     End of Encounter Meds  1  Calcium 500 MG TABS; TAKE 1 TABLET DAILY; Therapy: (Recorded:23Nov2016) to Recorded   2  Multi-Vitamin TABS; TAKE 1 TABLET DAILY; Therapy: (Recorded:23Nov2016) to Recorded  3  Tamoxifen Citrate 20 MG Oral Tablet; TAKE 1 TABLET DAILY; Therapy: 85DVJ4185 to (Evaluate:09Tpe5933)  Requested for: 84AMK7781; Last   Rx:19Eew1053 Ordered  4  ALPRAZolam 0 25 MG Oral Tablet; Takes prn; Therapy: (Recorded:23Nov2016) to Recorded  5  Claritin TABS; TAKE 1 TABLET DAILY;    Therapy: (Recorded:08Xmj2723) to Recorded    Signatures   Electronically signed by : JOSE Gutiérrez ; Nov 6 2017  9:05AM EST                       (Author)

## 2017-11-13 ENCOUNTER — ALLSCRIPTS OFFICE VISIT (OUTPATIENT)
Dept: OTHER | Facility: OTHER | Age: 52
End: 2017-11-13

## 2017-11-14 NOTE — PROGRESS NOTES
Assessment    1  Malignant neoplasm of upper-outer quadrant of left female breast (174 4) (C50 412)   2  Use of tamoxifen (Nolvadex) (V07 51) (Z79 025)    Plan  Malignant neoplasm of upper-outer quadrant of left female breast    · We recommend that you examine your own breasts for lumps and other changes  ;Status:Complete;   Done: 08UEB5558   Ordered; For:Malignant neoplasm of upper-outer quadrant of left female breast; Ordered By:Proothi, Álvaro;   · (1) CA 27 29; Status:Active; Requested for:02Qul9154; Perform:[facility]; MAB:72QET4189; Last Updated By:Brenna Liu; 11/13/2017 10:48:16 AM;Ordered; For:Malignant neoplasm of upper-outer quadrant of left female breast; Ordered By:Proothi, Álvaro;   · (1) CA 27 29; Status:Active; Requested for:13Nov2017;    Perform:[facility]; CHAPARRO:40ZJC5702; Ordered; For:Malignant neoplasm of upper-outer quadrant of left female breast; Ordered By:Proothi, Álvaro;   · (1) CA 27 29; Status:Active; Requested for:22Glt4082; Perform:[facility]; PQW:06XCF5321; Last Updated By:Brenna Liu; 11/13/2017 10:48:30 AM;Ordered; For:Malignant neoplasm of upper-outer quadrant of left female breast; Ordered By:Proothi, Álvaro;   · (1) CA 27 29; Status:Active; Requested for:30Apr2018; Perform:[facility]; Due:30Apr2019; Last Updated By:Brenna Liu; 11/13/2017 10:48:23 AM;Ordered;neoplasm of upper-outer quadrant of left female breast; Ordered By:Proothi, Álvaro;   · (1) CA 27 29; Status:Complete; Requested for:Recurring Schedule: 11/13/2017;2/5/2018; 4/30/2018; 7/23/2018 ; Perform:[facility]; Due:13Nov2018; Ordered; For:Malignant neoplasm of upper-outer quadrant of left female breast; Ordered By:Álvaro Yarbrough;   · (1) CBC/PLT/DIFF; Status:Active; Requested for:69Iks9084;     Perform:[facility]; YOF:63FRZ9747; Last Updated By:Brenna Liu; 11/13/2017 10:48:16 AM;Ordered; For:Malignant neoplasm of upper-outer quadrant of left female breast; Ordered By:Proothi, Álvaro;   · (1) CBC/PLT/DIFF; Status:Active; Requested for:13Nov2017;    Perform:[facility]; BMT:67LDE0011; Ordered; For:Malignant neoplasm of upper-outer quadrant of left female breast; Ordered By:Proothi, Álvaro;   · (1) CBC/PLT/DIFF; Status:Active; Requested for:26Ytz2952; Perform:[facility]; UCU:38RKR9155; Last Updated By:Kellie Liu; 11/13/2017 10:48:30 AM;Ordered; For:Malignant neoplasm of upper-outer quadrant of left female breast; Ordered By:Proothi, Álvaro;   · (1) CBC/PLT/DIFF; Status:Active; Requested for:30Apr2018; Perform:[facility]; Due:30Apr2019; Last Updated By:Kellie Liu; 11/13/2017 10:48:23 AM;Ordered;neoplasm of upper-outer quadrant of left female breast; Ordered By:Proothi, Álvaro;   · (1) CBC/PLT/DIFF; Status:Complete; Requested for:Recurring Schedule: 11/13/2017;2/5/2018; 4/30/2018; 7/23/2018 ; Perform:[facility]; Due:13Nov2018; Ordered; For:Malignant neoplasm of upper-outer quadrant of left female breast; Ordered By:Proothi, Álvaro;   · (1) COMPREHENSIVE METABOLIC PANEL; Status:Active; Requested for:00Oho5518; Perform:[facility]; KAREN:61WLP6013; Last Updated By:Kellie Liu; 11/13/2017 10:48:16 AM;Ordered; For:Malignant neoplasm of upper-outer quadrant of left female breast; Ordered By:Proothi, Álvaro;   · (1) COMPREHENSIVE METABOLIC PANEL; Status:Active; Requested for:13Nov2017;    Perform:[facility]; RFP:[id number]; Ordered; For:Malignant neoplasm of upper-outer quadrant of left female breast; Ordered By:Proothi, Álvaro;   · (1) COMPREHENSIVE METABOLIC PANEL; Status:Active; Requested for:19Yvd8002; Perform:[facility]; YRL:[id number]; Last Updated By:Kellie Liu; 11/13/2017 10:48:30 AM;Ordered; For:Malignant neoplasm of upper-outer quadrant of left female breast; Ordered By:Proothi, Álvaro;   · (1) COMPREHENSIVE METABOLIC PANEL; Status:Active; Requested for:30Apr2018; Perform:[facility]; Due:30Apr2019; Last Updated By:Claudia Liu; 11/13/2017 10:48:23 AM;Ordered;neoplasm of upper-outer quadrant of left female breast; Ordered By:Proothi, Álvaro;   · (1) COMPREHENSIVE METABOLIC PANEL; Status:Complete; Requested for:RecurringSchedule: 11/13/2017; 2/5/2018; 4/30/2018; 7/23/2018 ; Perform:[facility]; Due:13Nov2018; Ordered;neoplasm of upper-outer quadrant of left female breast; Ordered By:Proothi Álvaro;  Use of tamoxifen (Nolvadex)    · Follow-up visit in 3 months Evaluation and Treatment  Follow-up  Status: Complete Done: 85VHR4622 09:45AM   Ordered;Use of tamoxifen (Nolvadex); Ordered By: Naldo Navarro Performed:  Due: 48PBF4422; Last Updated By: Mariah Hardy; 11/13/2017 10:51:38 AM    Discussion/Summary  Discussion Summary:   In January 2017 patient had lumpectomy and sentinel lymph node sampling for hormone receptor positive, HER-2 negative, G1, T1c, 1 4 cm, stage I invasive mammary carcinoma in the left breast    Oncotype score was 10  BRCA test was negative  Since February 2017 she has been on tamoxifen  She received radiation  She has noticed some thinning of the hair on tamoxifen  Hot flashes  Mood swings  Patient is premenopausal  She could be going through the change of life because menstrual periods are becoming less frequent  is taking vitamin D and calcium and baby aspirin 3 days a week  examination and test results are as recorded and discussed  She remains in remission from breast cancer  We discussed making her postmenopausal now either by ovarian suppression or BSO  She would prefer to go into postmenopausal status naturally especially when her periods are getting less frequent  is up-to-date with her GYN examination  self breast examination, eating healthy foods and exercises as tolerated  She is staying active  voiced understanding and agreement with above discussion  She is aware to contact us with questions or symptoms in the interim  We'll see her in follow-up in 3 months  Counseling Documentation With Imm: The patient was counseled regarding diagnostic results,-- instructions for management,-- risk factor reductions,-- prognosis,-- patient and family education,-- impressions,-- importance of compliance with treatment  total time of encounter was 30 minutes-- and-- 20 minutes was spent counseling  Goals and Barriers: The patient has the current Goals: Cure from breast cancer  The patent has the current Barriers: None  Patient's Capacity to Self-Care: Patient is able to Self-Care  Medication SE Review and Pt Understands Tx: Possible side effects of new medications were reviewed with the patient/guardian today  The treatment plan was reviewed with the patient/guardian  The patient/guardian understands and agrees with the treatment plan   Self Referrals:   Self Referrals: No      Chief Complaint  Chief Complaint: Chief Complaint:  The patient presents to the office today with Breast cancer and on adjuvant hormone therapy  Chief Complaint Free Text Note Form: Breast cancer and on adjuvant hormone therapy      History of Present Illness  HPI: In January 2017 patient had lumpectomy and sentinel lymph node sampling for hormone receptor positive, HER-2 negative, G1, T1c, 1 4 cm, stage I invasive mammary carcinoma in the left breast    Oncotype score was 10  BRCA test was negative  Since February 2017 she has been on tamoxifen  She received radiation  She has noticed some thinning of the hair on tamoxifen  Hot flashes  Mood swings  Patient is premenopausal  She could be going through the change of life because menstrual periods are becoming less frequent  is taking vitamin D and calcium and baby aspirin 3 days a week     Interval History:       Review of Systems  Complete-Female:  Constitutional: No fever, no chills, feels well, no tiredness, no recent weight gain or weight loss  Eyes: No complaints of eye pain, no red eyes, no eyesight problems, no discharge, no dry eyes, no itching of eyes  ENT: no complaints of earache, no loss of hearing, no nose bleeds, no nasal discharge, no sore throat, no hoarseness,-- no earache,-- no nosebleeds,-- no sore throat,-- no hearing loss,-- no nasal discharge-- and-- no hoarseness  Cardiovascular: No complaints of slow heart rate, no fast heart rate, no chest pain, no palpitations, no leg claudication, no lower extremity edema  Respiratory: No complaints of shortness of breath, no wheezing, no cough, no SOB on exertion, no orthopnea, no PND  Gastrointestinal: No complaints of abdominal pain, no constipation, no nausea or vomiting, no diarrhea, no bloody stools  Genitourinary: No complaints of dysuria, no incontinence, no pelvic pain, no dysmenorrhea, no vaginal discharge or bleeding  Musculoskeletal: No complaints of arthralgias, no myalgias, no joint swelling or stiffness, no limb pain or swelling  Integumentary: No complaints of skin rash or lesions, no itching, no skin wounds, no breast pain or lump  Neurological: No complaints of headache, no confusion, no convulsions, no numbness, no dizziness or fainting, no tingling, no limb weakness, no difficulty walking  Psychiatric: Minor mood swings, but-- Not suicidal, no sleep disturbance, no anxiety or depression, no change in personality, no emotional problems  Endocrine: hot flashes, but-- no proptosis,-- no muscle weakness-- and-- no deepening of the voice  no feelings of weakness  Hematologic/Lymphatic: No complaints of swollen glands, no swollen glands in the neck, does not bleed easily, does not bruise easily  ROS Reviewed:   ROS reviewed  Active Problems    1  Abdominal pain, LLQ (left lower quadrant) (789 04) (R10 32)   2  Discharge of vagina (623 5) (N89 8)   3   Encounter for gynecological examination (V72 31) (Z01 419)   4  Environmental and seasonal allergies (477 8) (J30 89)   5  Inconclusive mammography due to dense breasts (793 82) (R92 2)   6  Lower back pain (724 2) (M54 5)   7  Malignant neoplasm of upper-outer quadrant of left female breast (174 4) (C50 412)   8  Premenopausal patient (627 2) (N95 9)   9  Screening for breast cancer (V76 10) (Z12 31)   10  Use of tamoxifen (Nolvadex) (V07 51) (Z79 810)   11  Vaginal irritation (623 9) (N89 8)   12  Visit for routine gyn exam (V72 31) (Z01 419)   13  Vulvar burning (625 9) (N94 89)   14  Vulvar irritation (624 8) (N90 89)   Reviewed   Past Medical History    1  History of Anxiety (300 00) (F41 9)   2  History of BRCA negative (V82 71) (Z13 71)   3  History of pregnancy (V13 29)   4  History of Menarche (V21 8)   5  History of Radiation Therapy   6  History of Uses birth control (V25 9) (Z30 9)   Reviewed   Surgical History  1  History of Appendectomy   2  History of Biopsy Breast Percutaneous Needle Core   3  History of Breast Surgery Lumpectomy   4  History of [place] Lymph Node Biopsy  Surgical History Reviewed: The surgical history was reviewed and updated today  Family History  Mother    1  Family history of lung cancer (V16 1) (Z80 1)   2  Patient's mother is  (V24 11) (Z80 80)  Father    3  Family history of lung cancer (V16 1) (Z80 1)   4  Patient's father is  (V24 11) (Z80 80)  Sister    5  Family history of    6  Family history of malignant neoplasm of breast (V16 3) (Z80 3)  Paternal Aunt    7  Family history of malignant neoplasm of breast (V16 3) (Z80 3)  Family History Reviewed: The family history was reviewed and updated today         Social History     · Alcohol use (V49 89) (Z78 9)   · Currently sexually active   · Exercises 5 to 6 times per week (V49 89) (Z78 9)   · Full-time employment   · Lives with spouse   ·    · Never a smoker   · No caffeine use   · Occasional cigarette smoker (305  1) (Z72 0)   · Occupation   · Two children  Social History Reviewed: The social history was reviewed and updated today  The social history was reviewed and is unchanged  Current Meds   1  ALPRAZolam 0 25 MG Oral Tablet; Takes prn; Therapy: (Recorded:23Nov2016) to Recorded   2  Calcium 500 MG TABS; TAKE 1 TABLET DAILY; Therapy: (Recorded:23Nov2016) to Recorded   3  Claritin TABS; TAKE 1 TABLET DAILY; Therapy: (Recorded:01Bua7828) to Recorded   4  Multi-Vitamin TABS; TAKE 1 TABLET DAILY; Therapy: (Recorded:23Nov2016) to Recorded   5  Tamoxifen Citrate 20 MG Oral Tablet; TAKE 1 TABLET DAILY; Therapy: 69QUO5088 to (Evaluate:69Pxl4997)  Requested for: 39YKM6911; Last Rx:90Gvw2022 Ordered    Allergies  1  No Known Drug Allergies    2  Other   Reviewed   Vitals  Vital Signs    Recorded: 70BVC0614 10:18AM   Temperature 99 4 F   Heart Rate 78   Respiration 15   Systolic 639   Diastolic 72   Height 5 ft 6 in   Weight 119 lb 2 oz   BMI Calculated 19 23   BSA Calculated 1 6   O2 Saturation 97   Pain Scale 0      Reviewed   Physical Exam     Patient is alert and oriented and not in  distress  Stable vital signs  No icterus  No oral thrush  No palpable neck mass  Lung fields are clear to percussion and auscultation  Heart rate is regular  Short systolic murmur  Abdomen soft and nontender  No palpable abdominal mass  No ascites  No edema of ankles  No calf tenderness  No focal neurological deficit  No skin rash  No palpable lymphadenopathy in the neck and axillary areas  Good arterial pulses  No clubbing  No lymphedema  She goes to Dr Darryl Ontiveros for examination of breasts and imaging studies   Performance status 0     ECOG 0       Results/Data  (1) CBC/PLT/DIFF 31Oct2017 05:37PM Proothi, Myrtie Merlin Order Number: [id number]     Test Name Result Flag Reference   WBC COUNT 8 02 Thousand/uL  4 31-10 16   RBC COUNT 4 26 Million/uL  3 81-5 12   HEMOGLOBIN 12 8 g/dL  11 5-15 4   HEMATOCRIT 38 6 %  34 8-46 1 MCV 91 fL  82-98   MCH 30 0 pg  26 8-34 3   MCHC 33 2 g/dL  31 4-37 4   RDW 13 9 %  11 6-15 1   MPV 12 2 fL  8 9-12 7   PLATELET COUNT 370 Thousands/uL  149-390   nRBC AUTOMATED 0 /100 WBCs     NEUTROPHILS RELATIVE PERCENT 68 %  43-75   LYMPHOCYTES RELATIVE PERCENT 21 %  14-44   MONOCYTES RELATIVE PERCENT 10 %  4-12   EOSINOPHILS RELATIVE PERCENT 1 %  0-6   BASOPHILS RELATIVE PERCENT 0 %  0-1   NEUTROPHILS ABSOLUTE COUNT 5 47 Thousands/? ??L  1 85-7 62   LYMPHOCYTES ABSOLUTE COUNT 1 66 Thousands/? ??L  0 60-4 47   MONOCYTES ABSOLUTE COUNT 0 77 Thousand/? ??L  0 17-1 22   EOSINOPHILS ABSOLUTE COUNT 0 07 Thousand/? ??L  0 00-0 61   BASOPHILS ABSOLUTE COUNT 0 03 Thousands/? ??L  0 00-0 10     (1) CA 27 29 31Oct2017 05:37PM Kath Yarbrough Order Number: [id number]     Test Name Result Flag Reference   CA 27 29(NEW) 14 3 U/mL  0 0-42 3     (1) COMPREHENSIVE METABOLIC PANEL 93LUI8136 01:20VP Vladimir Kraft Order Number: [id number]     Test Name Result Flag Reference   GLUCOSE,RANDM 80 mg/dL       If the patient is fasting, the ADA then defines impaired fasting glucose as > 100 mg/dL and diabetes as > or equal to 123 mg/dL  Specimen collection should occur prior to Sulfasalazine administration due to the potential for falsely depressed results  Specimen collection should occur prior to Sulfapyridine administration due to the potential for falsely elevated results     SODIUM 138 mmol/L  136-145   POTASSIUM 3 9 mmol/L  3 5-5 3   CHLORIDE 105 mmol/L  100-108   CARBON DIOXIDE 26 mmol/L  21-32   ANION GAP (CALC) 7 mmol/L  4-13   BLOOD UREA NITROGEN 17 mg/dL  5-25   CREATININE 0 72 mg/dL  0 60-1 30   Standardized to IDMS reference method   CALCIUM 9 0 mg/dL  8 3-10 1   BILI, TOTAL 0 31 mg/dL  0 20-1 00   ALK PHOSPHATAS 49 U/L     ALT (SGPT) 23 U/L  12-78   Specimen collection should occur prior to Sulfasalazine and/or Sulfapyridine administration due to the potential for falsely depressed results  AST(SGOT) 20 U/L  5-45   Specimen collection should occur prior to Sulfasalazine administration due to the potential for falsely depressed results  ALBUMIN 3 8 g/dL  3 5-5 0   TOTAL PROTEIN 7 3 g/dL  6 4-8 2   eGFR 98 ml/min/1 73sq m       National Kidney Disease Education Program recommendations are as follows: GFR calculation is accurate only with a steady state creatinine Chronic Kidney disease less than 60 ml/min/1 73 sq  meters Kidney failure less than 15 ml/min/1 73 sq  meters  Future Appointments    Date/Time Provider Specialty Site   05/08/2018 10:45 AM JOSE Aggarwal   Surgical Oncology [facility]   11/20/2017 11:30 AM Ivett Yarbrough MD Hematology Oncology [facility]       Signatures   Electronically signed by : Annabelle Ford MD; Nov 13 2017 10:54AM EST                       (Author)

## 2017-11-20 ENCOUNTER — APPOINTMENT (OUTPATIENT)
Dept: RADIATION ONCOLOGY | Facility: HOSPITAL | Age: 52
End: 2017-11-20
Attending: RADIOLOGY
Payer: COMMERCIAL

## 2017-11-20 ENCOUNTER — TRANSCRIBE ORDERS (OUTPATIENT)
Dept: ADMINISTRATIVE | Facility: HOSPITAL | Age: 52
End: 2017-11-20

## 2017-11-20 ENCOUNTER — GENERIC CONVERSION - ENCOUNTER (OUTPATIENT)
Dept: OTHER | Facility: OTHER | Age: 52
End: 2017-11-20

## 2017-11-20 DIAGNOSIS — C50.412 MALIGNANT NEOPLASM OF UPPER-OUTER QUADRANT OF LEFT FEMALE BREAST, UNSPECIFIED ESTROGEN RECEPTOR STATUS (HCC): Primary | ICD-10-CM

## 2017-11-20 PROCEDURE — 99214 OFFICE O/P EST MOD 30 MIN: CPT | Performed by: RADIOLOGY

## 2017-11-20 PROCEDURE — G0463 HOSPITAL OUTPT CLINIC VISIT: HCPCS | Performed by: RADIOLOGY

## 2017-11-27 ENCOUNTER — HOSPITAL ENCOUNTER (OUTPATIENT)
Dept: RADIOLOGY | Facility: HOSPITAL | Age: 52
Discharge: HOME | End: 2017-11-27
Attending: RADIOLOGY
Payer: COMMERCIAL

## 2017-11-27 ENCOUNTER — TRANSCRIBE ORDERS (OUTPATIENT)
Dept: RADIOLOGY | Facility: HOSPITAL | Age: 52
End: 2017-11-27

## 2017-11-27 DIAGNOSIS — C50.412 MALIGNANT NEOPLASM OF UPPER-OUTER QUADRANT OF LEFT FEMALE BREAST, UNSPECIFIED ESTROGEN RECEPTOR STATUS (HCC): ICD-10-CM

## 2017-11-27 PROCEDURE — 71250 CT THORAX DX C-: CPT

## 2018-01-10 NOTE — MISCELLANEOUS
Message  Spoke with pt for post operative follow up  Pt was very concerned and anxious about how her surgery went and if additional tissue and lymph nodes were removed  Reviewed and discussed needle localization, lymphoscintigraphy, lymphatic mapping and lumpectomy procedures  Pt still anxious, spoke with Dr Ritchie Moreno and confirmed surgery went well with no complications  Reassured pt and provided support as able  Pt shares she is anxious regarding final pathology report  Again provided support as able  Pt's incision lines are dry and intact  She is using OTC pain medication which is of relief  Pt has follow up appt scheduled with Dr Ritchie Moreno  Active Problems    1  Abdominal pain, LLQ (left lower quadrant) (789 04) (R10 32)   2  Discharge of vagina (623 5) (N89 8)   3  Inconclusive mammography due to dense breasts (793 82) (R92 2)   4  Lower back pain (724 2) (M54 5)   5  Malignant neoplasm of upper-outer quadrant of left female breast (174 4) (C50 412)   6  Screening for breast cancer (V76 10) (Z12 39)   7  Visit for routine gyn exam (V72 31) (Z01 419)   8  Vulvar burning (625 9) (N94 89)   9  Vulvar irritation (624 8) (N90 89)    Current Meds   1  Acetaminophen-Codeine #3 300-30 MG Oral Tablet; TAKE 1 TABLET EVERY 4 TO 6   HOURS AS NEEDED FOR PAIN;   Therapy: 01QME8453 to (Evaluate:61Hjm5792); Last Rx:99Nvj1221 Ordered   2  ALPRAZolam 0 25 MG Oral Tablet; Takes prn; Therapy: (Recorded:23Nov2016) to Recorded   3  Calcium 500 MG TABS; TAKE 1 TABLET DAILY; Therapy: (Recorded:23Nov2016) to Recorded   4  Multi-Vitamin TABS; TAKE 1 TABLET DAILY; Therapy: (Recorded:23Nov2016) to Recorded   5  Naproxen 500 MG Oral Tablet; 1 PO BID with food for 14 days then prn; Therapy: 94IUJ3973 to (Last Rx:19Oct2012) Ordered   6  Nystatin-Triamcinolone 679136-5 1 UNIT/GM-% External Ointment; Apply pencil erasure   sized anount to affected area twice daily x 7   days  Max 25 days;    Therapy: 70XOK7384 to (Last Rx:03Nov2016) Requested for: 94CMF9635 Ordered    Allergies    1  No Known Drug Allergies    2  Other    Signatures   Electronically signed by :  Justice Peabody, ; Jan 4 2017 11:08AM EST                       (Author)

## 2018-01-10 NOTE — PROGRESS NOTES
Discussion/Summary  Social Work-Discussion Summary [facility]: Patient is being seen for a distress screenning assessment  LSW reviewed pt's distress thermometer completed by pt on 1/26/2016 in med onc  Pt rated their distress a 3/10 and denied psychosocial problems  Based on pt's score, a social work follow up would not be indicated  If pt expresses psychosocial needs, LSW is available to provide cancer care counseling        Signatures   Electronically signed by : Wendy Romero; Jan 27 2017  2:27PM EST                       (Author)

## 2018-01-11 NOTE — PROGRESS NOTES
Discussion/Summary  Social Work-Discussion Summary [facility]: Patient is being seen for a distress screenning assessment  Received and reviewed Pt's Distress Thermometer completed by Pt on 3/1/17 in the [place] [address] office  Pt rated her distress at a 1-2/10 and named nervousness and worry as emotional problems  Based upon Pt's low distress score, a social work follow up is not indicated  If Pt expresses psychosocial needs, Cancer Counselor is available to provide counseling and intervention         Signatures   Electronically signed by : Briana Casas, [address]; Mar 15 2017 10:38AM EST                       (Author)

## 2018-01-11 NOTE — RESULT NOTES
Verified Results  US GUIDED BREAST BIOPSY LEFT COMPLETE [id number] 09:37AM Darius Acevedo     Test Name Result Flag Reference   US GUIDED BREAST BIOPSY LEFT COMPLETE (Report)     1:00 left breast mass     Mammo Post Biopsy: Left Breast - November 17, 2016 - Check In #:    [de-identified]   CC and ML view(s) were taken of the left breast      Technologist: GUERO Perry (R)(M)   After informed consent was obtained, the patient was positioned    for an ultrasound-guided core biopsy of the left breast  The    previously described mass was again identified with ultrasound  Betadine was used as sterile prep and 1% lidocaine as local    anesthetic  After a small skin incision was made, a [employer]   12 gauge core needle was advanced to the lesion under ultrasound   guidance  Multiple core biopsies were then obtained with    ultrasound confirming the core needle traversing the lesion  The   core specimens were then sent to Pathology for further    evaluation  A hydromark clip was then deployed in order to conor    the biopsy site to facilitate future intervention if necessary  Post-biopsy mammogram demonstrates clip concordance with    previously described mammographic finding  The patient tolerated the procedure well and left the department    in stable condition  Successful ultrasound-guided core biopsy of 1:00 left   breast mass       US Guided Breast Biopsy Left: November 17, 2016 - Check In #:    [de-identified]   Technologist: Velma High RDMS     Pathology Results: Pending     Recommendation:   Pathology pending of the left breast      Transcription Location: [facility] 98: [id number]   Signed by:   Wesley Escobar MD   11/17/16

## 2018-01-12 VITALS
BODY MASS INDEX: 19.63 KG/M2 | RESPIRATION RATE: 16 BRPM | TEMPERATURE: 97.6 F | HEIGHT: 66 IN | DIASTOLIC BLOOD PRESSURE: 68 MMHG | WEIGHT: 122.13 LBS | HEART RATE: 80 BPM | SYSTOLIC BLOOD PRESSURE: 100 MMHG | OXYGEN SATURATION: 97 %

## 2018-01-12 VITALS
WEIGHT: 120.5 LBS | BODY MASS INDEX: 19.37 KG/M2 | HEIGHT: 66 IN | DIASTOLIC BLOOD PRESSURE: 60 MMHG | SYSTOLIC BLOOD PRESSURE: 104 MMHG

## 2018-01-13 VITALS
SYSTOLIC BLOOD PRESSURE: 112 MMHG | BODY MASS INDEX: 19.29 KG/M2 | TEMPERATURE: 97.9 F | HEART RATE: 72 BPM | WEIGHT: 120 LBS | RESPIRATION RATE: 16 BRPM | OXYGEN SATURATION: 99 % | DIASTOLIC BLOOD PRESSURE: 72 MMHG | HEIGHT: 66 IN

## 2018-01-13 VITALS
TEMPERATURE: 98.6 F | BODY MASS INDEX: 19.15 KG/M2 | HEART RATE: 71 BPM | RESPIRATION RATE: 16 BRPM | DIASTOLIC BLOOD PRESSURE: 74 MMHG | HEIGHT: 66 IN | WEIGHT: 119.13 LBS | SYSTOLIC BLOOD PRESSURE: 114 MMHG | OXYGEN SATURATION: 97 %

## 2018-01-13 VITALS
RESPIRATION RATE: 15 BRPM | OXYGEN SATURATION: 98 % | HEART RATE: 80 BPM | WEIGHT: 119.38 LBS | DIASTOLIC BLOOD PRESSURE: 72 MMHG | HEIGHT: 66 IN | SYSTOLIC BLOOD PRESSURE: 114 MMHG | TEMPERATURE: 98.5 F | BODY MASS INDEX: 19.19 KG/M2

## 2018-01-13 VITALS
TEMPERATURE: 97.4 F | DIASTOLIC BLOOD PRESSURE: 70 MMHG | WEIGHT: 120 LBS | BODY MASS INDEX: 19.29 KG/M2 | SYSTOLIC BLOOD PRESSURE: 100 MMHG | HEART RATE: 95 BPM | HEIGHT: 66 IN | RESPIRATION RATE: 16 BRPM | OXYGEN SATURATION: 99 %

## 2018-01-13 VITALS
HEIGHT: 66 IN | DIASTOLIC BLOOD PRESSURE: 68 MMHG | HEART RATE: 78 BPM | BODY MASS INDEX: 19.2 KG/M2 | WEIGHT: 119.5 LBS | SYSTOLIC BLOOD PRESSURE: 110 MMHG | RESPIRATION RATE: 14 BRPM | TEMPERATURE: 98.5 F | OXYGEN SATURATION: 99 %

## 2018-01-13 NOTE — RESULT NOTES
Verified Results  (1) THIN PREP PAP WITH IMAGING [id number] 11:05AM Sergey Hernandez    Order Number: [id number]     Test Name Result Flag Reference   LAB AP CASE REPORT (Report)     Gynecologic Cytology Report            Case: [id number]                  Authorizing Provider: Ty Souza MD     Collected:      05/01/2017 1105        First Screen:     DELORIS Ludwig   Received:      05/02/2017 1522        Specimen:  LIQUID-BASED PAP, SCREENING, Cervix   HPV HIGH RISK RESULT (Report)     HPV, High Risk: HPV NEG, HPV16 NEG, HPV18 NEG      Other High Risk HPV Negative, HPV 16 Negative, HPV 18 Negative  HPV types: 16,18,31,33,35,39,45,51,52,56,58,59,66 and 68 DNA are undetectable or below the pre-set threshold  Roche???s FDA approved Jj 4800 is utilized with strict adherence to the ???s instruction  manual to test for the presence of High-Risk HPV DNA, as well as HPV 16 and HPV 18  This instrument  has been validated by our laboratory and/or by the   A negative result does not preclude the presence of HPV infection because results depend on adequate  specimen collection, absence of inhibitors and sufficient DNA to be detected  Additionally, HPV negative  results are not intended to prevent women from proceeding to colposcopy if clinically warranted  Positive HPV test results indicate the presence of any one or more of the high risk types, but since patients  are often co-infected with low-risk types it does not rule out the presence of low-risk types in patients  with mixed infections  LAB AP GYN PRIMARY INTERPRETATION      Negative for intraepithelial lesion or malignancy  Electronically signed by DELORIS Ludwig on 5/8/2017 at 1:55 PM   LAB AP GYN SPECIMEN ADEQUACY      Satisfactory for evaluation  Endocervical/transformation zone component present     LAB AP GYN ADDITIONAL INFORMATION (Report)     Hologic's FDA approved ,  and ThinPrep Imaging System are   utilized with strict adherence to the 's instruction manual to   prepare gynecologic and non-gynecologic cytology specimens for the   production of ThinPrep slides as well as for gynecologic ThinPrep imaging  These processes have been validated by our laboratory and/or by the     The Pap test is not a diagnostic procedure and should not be used as the   sole means to detect cervical cancer  It is only a screening procedure to   aid in the detection of cervical cancer and its precursors  Both   false-negative and false-positive results have been experienced  Your   patient's test result should be interpreted in this context together with   the history and clinical findings

## 2018-01-13 NOTE — PROGRESS NOTES
Discussion/Summary  Social Work-Discussion Summary [facility]: Patient is being seen for a distress screenning assessment  Patient is being seen for an initial assessment   Tej Pérez referred patient due to high Distress thermometer score    Pt self assessed distress as high 10/10  Patient only check "feeling swollen" under physical concerns and "insurance" on practical concerns Pt admits to being very anxious, fearful and worrisome prior to the visit  She stated not know any outcomes prior to the visit was nerve wracking  MSW identified her role to support patient and her   MSW provided emotional support today  MSW will provide the financial counselor with patient information to assist patient with information regarding her insurance benefits        Signatures   Electronically signed by : JAD Silvestre; Nov 25 2016  3:06PM EST                       (Author)

## 2018-01-13 NOTE — RESULT NOTES
Verified Results  MAMMO SCREENING BILATERAL W 3D & CAD 15Jun2016 05:36PM Simón Hernandez Order Number: [id number]     Test Name Result Flag Reference   MAMMO SCREENING BILATERAL W 3D & CAD (Report)     Patient History:   Patient had tested negative for BRCA1  Family history of unknown cancer in father at age 48 or over,    breast cancer in maternal aunt at age 36, unknown cancer in    mother at age 48 or over, and breast cancer in sister at age 40  Patient is a former smoker  Patient's BMI is 19 4  Reason for exam: screening (asymptomatic)  Mammo Screening Bilateral W DBT and CAD: Val 15, 2016 - Check In   #: [de-identified]   2D/3D Procedure   3D views: Bilateral MLO view(s) were taken  2D views: Bilateral CC and XCCL view(s) were taken  Technologist: RT Kendall(R)(M)   Prior study comparison: May 18, 2015, bilateral digital screening   mammogram, performed at [facility]  May 12,   2014, bilateral digital screening mammogram, performed at [facility]  January 28, 2013, bilateral digital    screening mammogram, performed at [facility]    January 5, 2012, bilateral digital screening mammogram,    performed at [facility]  December 13, 2010,    bilateral WB digitl bilat bairon, performed at [address] [place] [address]  The breast tissue is heterogeneously dense, potentially limiting    the sensitivity of mammography  Patient risk, included in this    report, assists in determining the appropriate screening regimen    (such as 3-D mammography or the inclusion of automated breast    ultrasound or MRI)  3-D mammography may also remain indicated as    screening  A combination of mediolateral oblique 3D tomographic slices as    well as standard two-dimensional orthogonal images were obtained  The parenchymal pattern appears stable   No dominant soft tissue    mass or suspicious calcifications are noted  The skin and nipple   contours are within normal limits  No mammographic evidence of malignancy  No    significant changes when compared with prior studies  ASSESSMENT: BiRad:1 - Negative     Recommendation:   Routine screening mammogram in 1 year  A reminder letter will be   scheduled  8-10% of cancers will be missed on mammography  Management of a    palpable abnormality must be based on clinical grounds  Patients    will be notified of their results via letter from our facility  Accredited by [employer] of Radiology and FDA       Transcription Location: [facility] 98: [id number]     Risk Value(s):   Tyrer-Cuzick 10 Year: 5 845%, Tyrer-Cuzick Lifetime: 26 129%,    Myriad Table: 5 6%, KATHY 5 Year: 1 8%, NCI Lifetime: 17 3%   Signed by:   Alex Ceron MD   6/16/16

## 2018-01-14 VITALS
BODY MASS INDEX: 19.15 KG/M2 | HEIGHT: 66 IN | DIASTOLIC BLOOD PRESSURE: 72 MMHG | WEIGHT: 119.13 LBS | TEMPERATURE: 99.4 F | RESPIRATION RATE: 15 BRPM | OXYGEN SATURATION: 97 % | HEART RATE: 78 BPM | SYSTOLIC BLOOD PRESSURE: 122 MMHG

## 2018-01-14 VITALS
RESPIRATION RATE: 14 BRPM | TEMPERATURE: 97.8 F | SYSTOLIC BLOOD PRESSURE: 110 MMHG | DIASTOLIC BLOOD PRESSURE: 68 MMHG | BODY MASS INDEX: 19.61 KG/M2 | OXYGEN SATURATION: 99 % | WEIGHT: 122 LBS | HEART RATE: 76 BPM | HEIGHT: 66 IN

## 2018-01-14 VITALS
TEMPERATURE: 98.8 F | HEART RATE: 87 BPM | WEIGHT: 122 LBS | DIASTOLIC BLOOD PRESSURE: 84 MMHG | BODY MASS INDEX: 19.61 KG/M2 | RESPIRATION RATE: 15 BRPM | SYSTOLIC BLOOD PRESSURE: 128 MMHG | HEIGHT: 66 IN | OXYGEN SATURATION: 99 %

## 2018-01-14 VITALS
HEIGHT: 66 IN | RESPIRATION RATE: 14 BRPM | WEIGHT: 119 LBS | SYSTOLIC BLOOD PRESSURE: 106 MMHG | DIASTOLIC BLOOD PRESSURE: 60 MMHG | HEART RATE: 80 BPM | OXYGEN SATURATION: 99 % | TEMPERATURE: 99.1 F | BODY MASS INDEX: 19.13 KG/M2

## 2018-01-14 NOTE — MISCELLANEOUS
Message  Return to work or school:   Latia Pak is under my professional care  She was seen in my office on 01/17/17   She is able to return to work on  01/23/17    She can perform work without limitations  Patient has scheduled appointments for consultations on January 26, 2017 at 8:15 am and 1:30 pm         Signatures   Electronically signed by :  Joan Woods, ; Jan 18 2017  4:25PM EST                       (Author)

## 2018-01-15 NOTE — RESULT NOTES
Verified Results  MAMMO DIAGNOSTIC LEFT W CAD [id number] 08:23AM Orion Sanches Order Number: [id number]    - Patient Instructions: To schedule this appointment, please contact Central Scheduling at [phone]  Do not wear any perfume, powder, lotion or deodorant on breast or underarm area  Please bring your doctors order, referral (if needed) and insurance information with you on the day of the test  Failure to bring this information may result in this test being rescheduled  Arrive 15 minutes prior to your appointment time to register  On the day of your test, please bring any prior mammogram or breast studies with you that were not performed at a [facility]  Failure to bring prior exams may result in your test needing to be rescheduled   Order Number: [id number]    - Patient Instructions: To schedule this appointment, please contact Central Scheduling at [phone]  Do not wear any perfume, powder, lotion or deodorant on breast or underarm area  Please bring your doctors order, referral (if needed) and insurance information with you on the day of the test  Failure to bring this information may result in this test being rescheduled  Arrive 15 minutes prior to your appointment time to register  On the day of your test, please bring any prior mammogram or breast studies with you that were not performed at a [facility]  Failure to bring prior exams may result in your test needing to be rescheduled  Test Name Result Flag Reference   MAMMO DIAGNOSTIC LEFT W CAD (Report)     Patient History:   Patient had tested negative for BRCA1  Family history of unknown cancer in father at age 48 or over,    breast cancer in maternal aunt at age 36, unknown cancer in    mother at age 48 or over, and breast cancer in sister at age 40  Patient is a former smoker  Patient's BMI is 19 4  Reason for exam: clinical finding       Mammo Diagnostic Left W CAD: November 14, 2016 - Check In #:    [de-identified]   Spot compression CC, ML, and spot compression MLO view(s) were    taken of the left breast      Technologist: RT Domingo(R)(M)   Prior study comparison: Val 15, 2016, mammo screening bilateral    W DBT and CAD, performed at [place] [address]  May 18, 2015, bilateral digital screening mammogram,    performed at [address] [place] [address]  May 12, 2014,    bilateral digital screening mammogram, performed at [place] [address] Breast Left Limited: November 14, 2016 - Check In #: [de-identified]   Technologist: Coty Gregory RDMS   Diagnostic images of LEFT breast were obtained  There is a large   ill-defined relatively dense mass with associated distortion    seen in the posterior upper outer breast  This correlates with    the overlying area of painful palpable concern  Targeted ultrasound of posterior upper outer breast was performed   with a high frequency linear transducer  In the 1 o'clock    position 6 cm from the nipple, the area of palpable concern    correlates with an ill-defined lobulated hypoechoic bilobed    appearing solid mass measuring 3 1 x 1 0 x 1 9 cm suspicious for    malignancy  Further workup with ultrasound-guided core biopsy    sampling is recommended  The area of patient's palpable concern is suspicious,   as detailed above  Ultrasound-guided core biopsy recommended  Findings and recommendation for biopsy were personally discussed    with the patient in conjunction with nurse navigator Josue Bustamante       ASSESSMENT: BiRad:4 - Suspicious (Overall)     Recommendation:   Ultrasound-guided biopsy of the left breast    Analyzed by CAD     Transcription Location: [address]   Signing Station: [id number]     Risk Value(s):   Tyrer-Cuzick 10 Year: 6 044%, Tyrer-Cuzick Lifetime: 25 762%,    Myriad Table: 5 6%, KATHY 5 Year: 1 8%, NCI Lifetime: 17 1%   Signed by:   Joanna Preston MD   11/14/16

## 2018-01-16 NOTE — PROGRESS NOTES
Discussion/Summary  Social Work-Discussion Summary  ke: Patient is being seen for a distress screenning assessment  LSW reviewed pt's second distress thermometer completed by pt on 3/9/2017 in rad onc  Pt rated their distress a 2/10 and named nervousness and worry as emotional problems  Due to pt's low distress score and limited psychosocial problems named a social work follow-up would not be indicated  If pt expresses psychosocial needs, LSW is available to provide cancer care counseling           Signatures   Electronically signed by : JOE Foy; Mar  9 2017  3:40PM EST                       (Author)

## 2018-01-16 NOTE — RESULT NOTES
Verified Results  (B) C/V/U EXPANDED W/O PAP W/O HPV PLUS (NON-[place]) [id number] 05:24PM Carmelo Lynn     Test Name Result Flag Reference   Chlamyd  Trach by Carmen Not Detected     SPECIMEN SOURCE:      C/V/U EXPANDED W/O PAP W/O HPV PLUS (NON-[place])  CLINICAL INFORMATION: Provided Diagnosis Codes: N89 8  MOLECULAR               INTERPRETATION:       Results do not favor Bacterial Vaginosis (BV)  MOLECULAR COMMENT:    Lactobacillus DNA is detected  No anaerobic                          bacterial DNA (from G  vaginalis, A  vaginae,                         Megasphaera, BVAB2) is detected  GC BY MULTIPLEX PCR Not Detected     Ureaplasma By Multiplex PCR Detected A    Mycoplasma Genitalium Multiplex Not Detected     Trichomonas By Mult PCR Not Detected     Herpes Simplex I Multi  Not Detected     Herpes II by Multiplex Not Detected     CANDIDA GENUS Not Detected     C ALBICANS BY PCR Not Detected     G  VAGINALIS BY PCR Not Detected     A  VAGINALIS BY PCR Not Detected     LACTOBACILLUS (SPECIES) BY PCR Detected     MEGASPHAERA TYPE 1 PCR Not Detected     BVAB2 BY PCR Not Detected     LACTOBACILLUS SP  LOG (CELLS/mL) >5 25     G  VAGINALS BY RT-PCR <3 25     A VAGINALIS LOG (CELL/ML) <3 25     CHLAMYDIA TRACHOMATIS BY MULTIPLEX PCR (1,6,7)  GC BY MULTIPLEX PCR (1,6,7)  UREAPLASMA BY MULTIPLEX PCR (1,2,6,7)  MYCOPLASMA GENITALIUM BY MULTIPLEX PCR (1,2,6,7)  TRICHOMONAS BY MULTIPLEX PCR (1,6,7)  HERPES SIMPLEX I BY MULTIPLEX (NON [place]) (1,6,7)  HERPES SIMPLEX II BY MULTIPLEX (NON [place]) (1,6,7)  CANDIDA GENUS (3,4,6,7)  C ALBICANS BY PCR (3,4,6,7)  G  VAGINALIS BY RT-PCR (5,6,8)  A  VAGINALIS BY RT-PCR (5,6,8)  LACTOBACILLUS (SPECIES) BY PCR (5,6,8)  MEGASPHAERA TYPE 1 BY RT PCR (5,6,8)  BVAB2 BY RT-PCR (5,6,8)  LACTOBACILLUS SP  LOG (CELLS/mL) (5,6,8)  G  VAGINALIS LOG (CELLS/mL) (5,6,8)  A   VAGINALIS LOG (CELLS/ML) (5,6,8)  (1)  This test is an in vitro test for the detection of sexually transmitted   infections (STIs) in clinical specimens  The test utilizes   amplification of target DNA by the Polymerase Chain Reaction (PCR)   based on dual priming oligonucleotide technology and detects STI DNA  (2)  Mycoplasma genitalium is a causative agent of Nongonoccocal urethritis   (TERE) in men and cervicitis in women  Ureaplasma spp  is associated   with TERE in men  (3)  The common causes of vulvovaginal candidiasis include Candida albicans,   Candida tropicalis, Candida glabrata, Candida parapsilosis, Candida   dubliniensis, Olive krusei and a few other species that colonize the   vagina  In several studies, non-albicans species such as tropicalis,   krusei and glabrata have demonstrated resistance to fluconazole and   other related azole antifungals  Using comparable data from another   assay ([employer] VPIII microbial identification test), molecular   testing using liquid-based cytology specimens in general for Candida   may not be as sensitive as culture or visual identification  (4)  This test utilizes amplification of target DNA for Candida genus and   selected Candida species by Polymerase Chain Reaction (PCR)  The limit   of detection in the genus panel is 100 copies for Candida genus and for   C  albicans  The limit of detection in the species panel is 10 copies   for C  albicans, C  dubliniensis, C  glabrata, C  krusei, C    parapsilosis, and C  tropicalis  Saccharomyces cerevisiae shares   significant homology with Candida genus and may cross react and give a   presumptive positive result; however, S  cerevisiae is rarely seen in   the cervicovaginal microbiome, but may be seen in the GI tract  Saccharomyces cerevisiae may have an interfering reaction with C    albicans in the species panel, resulting in lower amplification of the   C  albicans    (5)  This assay utilizes quantitative real time PCR (multiple detection   temperatures technology, MuDT(TM)) in liquid cytology specimens to   quantify the levels of nucleic acid of Lactobacillus species, G    vaginalis and A  vaginae, and also detects nucleic acid from several   obligate anaerobes associated with Bacterial Vaginosis (BV)   (6)  This test was evaluated and its performance characteristics determined   by [facility]  It has not been cleared or approved by   the [place]  Food and Drug Administration  The FDA has determined that   such clearance or approval is not necessary  [facility]   is certified under the Clinical Laboratory Improvement Act of 1988   (CLIA) as qualified to perform high complexity clinical testing   (7)  Results should be interpreted together with past and current clinical   and laboratory data  (8)  Results should be interpreted in light of current and past laboratory   data and symptomatology  Plan  Discharge of vagina    · Azithromycin 500 MG Oral Tablet;  Take both tablets po at once

## 2018-01-17 NOTE — MISCELLANEOUS
Message  Pt called with concerns of some mild swelling at her axillary incision line  She denies any redness, warmth or drainage from incision line  She has mild tenderness at site  Discussed this with her and the possiblility of some fluid collection at site  Instructed her to watch for signs of fever, pain, redness and warmth to touch and call office with any changes  She also shares she remains anxious about her pathology report  Spoke with Dr Roberto Leary who reviewed her report and pt was informed that no lymph nodes were involved  Pt was relieved to have this information  Discussed Dr Roberto Leary will review pathology report at her post operative follow up  Pt was greatful for information, she verbalized understanding  She has follow up appt scheduled with Dr Roberto Leary  Active Problems    1  Abdominal pain, LLQ (left lower quadrant) (789 04) (R10 32)   2  Discharge of vagina (623 5) (N89 8)   3  Inconclusive mammography due to dense breasts (793 82) (R92 2)   4  Lower back pain (724 2) (M54 5)   5  Malignant neoplasm of upper-outer quadrant of left female breast (174 4) (C50 412)   6  Screening for breast cancer (V76 10) (Z12 39)   7  Visit for routine gyn exam (V72 31) (Z01 419)   8  Vulvar burning (625 9) (N94 89)   9  Vulvar irritation (624 8) (N90 89)    Current Meds   1  Acetaminophen-Codeine #3 300-30 MG Oral Tablet; TAKE 1 TABLET EVERY 4 TO 6   HOURS AS NEEDED FOR PAIN;   Therapy: 28GCE2283 to (Evaluate:80Iov4357); Last Rx:27Hnc3769 Ordered   2  ALPRAZolam 0 25 MG Oral Tablet; Takes prn; Therapy: (Recorded:23Nov2016) to Recorded   3  Calcium 500 MG TABS; TAKE 1 TABLET DAILY; Therapy: (Recorded:23Nov2016) to Recorded   4  Multi-Vitamin TABS; TAKE 1 TABLET DAILY; Therapy: (Recorded:23Nov2016) to Recorded   5  Naproxen 500 MG Oral Tablet; 1 PO BID with food for 14 days then prn; Therapy: 42TGR6099 to (Last Rx:19Oct2012) Ordered   6  Nystatin-Triamcinolone 246280-1 1 UNIT/GM-% External Ointment;  Apply pencil erasure   sized anount to affected area twice daily x 7   days  Max 25 days; Therapy: 66CLF7695 to (Last Rx:03Nov2016)  Requested for: 35IDU4171 Ordered    Allergies    1  No Known Drug Allergies    2  Other    Signatures   Electronically signed by :  Leann Francisco, ; Jan 11 2017 12:30PM EST                       (Author)

## 2018-01-18 NOTE — MISCELLANEOUS
Message  Pt called with feeling anxious and questions once again regarding the "tissue exam in progress" she saw on her discharge summary sheet from the hospital  Explained at length how the tissue is sent to the pathology dept for review and a formal report is sent to Dr Andrew Medina from the pathologist  Pt is very anxious and support provided as able  Spoke with Support Counselor, Marcos Kayser  to reach out to this pt  for additional support  Pt is considering returning to work earlier than anticipated and will let the office know if additional paperwork would be needed  Also will need "ok" from Dr Andrew Medina  Pt verbalized understanding of information reviewed  Active Problems    1  Abdominal pain, LLQ (left lower quadrant) (789 04) (R10 32)   2  Discharge of vagina (623 5) (N89 8)   3  Inconclusive mammography due to dense breasts (793 82) (R92 2)   4  Lower back pain (724 2) (M54 5)   5  Malignant neoplasm of upper-outer quadrant of left female breast (174 4) (C50 412)   6  Screening for breast cancer (V76 10) (Z12 39)   7  Visit for routine gyn exam (V72 31) (Z01 419)   8  Vulvar burning (625 9) (N94 89)   9  Vulvar irritation (624 8) (N90 89)    Current Meds   1  Acetaminophen-Codeine #3 300-30 MG Oral Tablet; TAKE 1 TABLET EVERY 4 TO 6   HOURS AS NEEDED FOR PAIN;   Therapy: 48SCF1889 to (Evaluate:21Tcj4216); Last Rx:[id number] Ordered   2  ALPRAZolam 0 25 MG Oral Tablet; Takes prn; Therapy: (Recorded:23Nov2016) to Recorded   3  Calcium 500 MG TABS; TAKE 1 TABLET DAILY; Therapy: (Recorded:23Nov2016) to Recorded   4  Multi-Vitamin TABS; TAKE 1 TABLET DAILY; Therapy: (Recorded:23Nov2016) to Recorded   5  Naproxen 500 MG Oral Tablet; 1 PO BID with food for 14 days then prn; Therapy: 80NIB3974 to (Last Rx:19Oct2012) Ordered   6  Nystatin-Triamcinolone 893393-5 1 UNIT/GM-% External Ointment; Apply pencil erasure   sized anount to affected area twice daily x 7   days  Max 25 days;    Therapy: 38HQG4222 to (Last NN:[id number])  Requested for: 45QCP0744 Ordered    Allergies    1  No Known Drug Allergies    2  Other    Signatures   Electronically signed by :  Jim Mcallister, ; Jan 6 2017 10:35AM EST                       (Author)

## 2018-01-18 NOTE — PROGRESS NOTES
Discussion/Summary  Social Work-Discussion Summary [facility]: Patient is being seen for an ongoing assessment/follow up  MSW called patient to see how she was doing  MSW left message for patient to call MSW with any concerns or financial needs        Signatures   Electronically signed by : JAD Mccoy; Jan 4 2017  2:40PM EST                       (Author)

## 2018-01-22 VITALS
RESPIRATION RATE: 16 BRPM | BODY MASS INDEX: 19.77 KG/M2 | OXYGEN SATURATION: 99 % | TEMPERATURE: 98.5 F | HEIGHT: 66 IN | HEART RATE: 82 BPM | DIASTOLIC BLOOD PRESSURE: 64 MMHG | SYSTOLIC BLOOD PRESSURE: 112 MMHG | WEIGHT: 123 LBS

## 2018-02-05 DIAGNOSIS — C50.412 MALIGNANT NEOPLASM OF UPPER-OUTER QUADRANT OF LEFT FEMALE BREAST (HCC): ICD-10-CM

## 2018-03-01 ENCOUNTER — TELEPHONE (OUTPATIENT)
Dept: HEMATOLOGY ONCOLOGY | Facility: CLINIC | Age: 53
End: 2018-03-01

## 2018-03-03 ENCOUNTER — APPOINTMENT (OUTPATIENT)
Dept: LAB | Facility: HOSPITAL | Age: 53
End: 2018-03-03
Attending: INTERNAL MEDICINE
Payer: COMMERCIAL

## 2018-03-03 DIAGNOSIS — C50.412 MALIGNANT NEOPLASM OF UPPER-OUTER QUADRANT OF LEFT FEMALE BREAST (HCC): ICD-10-CM

## 2018-03-03 LAB
ALBUMIN SERPL BCP-MCNC: 3.8 G/DL (ref 3.5–5)
ALP SERPL-CCNC: 50 U/L (ref 46–116)
ALT SERPL W P-5'-P-CCNC: 29 U/L (ref 12–78)
ANION GAP SERPL CALCULATED.3IONS-SCNC: 6 MMOL/L (ref 4–13)
AST SERPL W P-5'-P-CCNC: 24 U/L (ref 5–45)
BASOPHILS # BLD AUTO: 0.06 THOUSANDS/ΜL (ref 0–0.1)
BASOPHILS NFR BLD AUTO: 1 % (ref 0–1)
BILIRUB SERPL-MCNC: 0.57 MG/DL (ref 0.2–1)
BUN SERPL-MCNC: 13 MG/DL (ref 5–25)
CALCIUM SERPL-MCNC: 9.4 MG/DL (ref 8.3–10.1)
CANCER AG27-29 SERPL-ACNC: 18.4 U/ML (ref 0–42.3)
CHLORIDE SERPL-SCNC: 106 MMOL/L (ref 100–108)
CO2 SERPL-SCNC: 30 MMOL/L (ref 21–32)
CREAT SERPL-MCNC: 0.66 MG/DL (ref 0.6–1.3)
EOSINOPHIL # BLD AUTO: 0.24 THOUSAND/ΜL (ref 0–0.61)
EOSINOPHIL NFR BLD AUTO: 5 % (ref 0–6)
ERYTHROCYTE [DISTWIDTH] IN BLOOD BY AUTOMATED COUNT: 13.8 % (ref 11.6–15.1)
GFR SERPL CREATININE-BSD FRML MDRD: 103 ML/MIN/1.73SQ M
GLUCOSE SERPL-MCNC: 72 MG/DL (ref 65–140)
HCT VFR BLD AUTO: 39.8 % (ref 34.8–46.1)
HGB BLD-MCNC: 13 G/DL (ref 11.5–15.4)
LYMPHOCYTES # BLD AUTO: 1.49 THOUSANDS/ΜL (ref 0.6–4.47)
LYMPHOCYTES NFR BLD AUTO: 28 % (ref 14–44)
MCH RBC QN AUTO: 30.1 PG (ref 26.8–34.3)
MCHC RBC AUTO-ENTMCNC: 32.7 G/DL (ref 31.4–37.4)
MCV RBC AUTO: 92 FL (ref 82–98)
MONOCYTES # BLD AUTO: 0.53 THOUSAND/ΜL (ref 0.17–1.22)
MONOCYTES NFR BLD AUTO: 10 % (ref 4–12)
NEUTROPHILS # BLD AUTO: 2.94 THOUSANDS/ΜL (ref 1.85–7.62)
NEUTS SEG NFR BLD AUTO: 56 % (ref 43–75)
NRBC BLD AUTO-RTO: 0 /100 WBCS
PLATELET # BLD AUTO: 267 THOUSANDS/UL (ref 149–390)
PMV BLD AUTO: 12.4 FL (ref 8.9–12.7)
POTASSIUM SERPL-SCNC: 4 MMOL/L (ref 3.5–5.3)
PROT SERPL-MCNC: 7.5 G/DL (ref 6.4–8.2)
RBC # BLD AUTO: 4.32 MILLION/UL (ref 3.81–5.12)
SODIUM SERPL-SCNC: 142 MMOL/L (ref 136–145)
WBC # BLD AUTO: 5.27 THOUSAND/UL (ref 4.31–10.16)

## 2018-03-03 PROCEDURE — 36415 COLL VENOUS BLD VENIPUNCTURE: CPT

## 2018-03-03 PROCEDURE — 85025 COMPLETE CBC W/AUTO DIFF WBC: CPT

## 2018-03-03 PROCEDURE — 86300 IMMUNOASSAY TUMOR CA 15-3: CPT

## 2018-03-03 PROCEDURE — 80053 COMPREHEN METABOLIC PANEL: CPT

## 2018-03-05 ENCOUNTER — OFFICE VISIT (OUTPATIENT)
Dept: HEMATOLOGY ONCOLOGY | Facility: CLINIC | Age: 53
End: 2018-03-05
Payer: COMMERCIAL

## 2018-03-05 VITALS
HEART RATE: 82 BPM | OXYGEN SATURATION: 98 % | BODY MASS INDEX: 19.13 KG/M2 | RESPIRATION RATE: 16 BRPM | HEIGHT: 66 IN | SYSTOLIC BLOOD PRESSURE: 108 MMHG | DIASTOLIC BLOOD PRESSURE: 62 MMHG | WEIGHT: 119 LBS | TEMPERATURE: 98.6 F

## 2018-03-05 DIAGNOSIS — C50.412 MALIGNANT NEOPLASM OF UPPER-OUTER QUADRANT OF LEFT BREAST IN FEMALE, ESTROGEN RECEPTOR POSITIVE (HCC): Primary | ICD-10-CM

## 2018-03-05 DIAGNOSIS — Z17.0 MALIGNANT NEOPLASM OF UPPER-OUTER QUADRANT OF LEFT BREAST IN FEMALE, ESTROGEN RECEPTOR POSITIVE (HCC): Primary | ICD-10-CM

## 2018-03-05 PROCEDURE — 99214 OFFICE O/P EST MOD 30 MIN: CPT | Performed by: INTERNAL MEDICINE

## 2018-03-05 RX ORDER — CALCIUM CARBONATE 500(1250)
1 TABLET ORAL DAILY
COMMUNITY

## 2018-03-05 RX ORDER — FEXOFENADINE HCL 180 MG/1
180 TABLET ORAL
COMMUNITY
End: 2020-03-26 | Stop reason: SDUPTHER

## 2018-03-05 RX ORDER — TAMOXIFEN CITRATE 20 MG/1
1 TABLET ORAL DAILY
COMMUNITY
Start: 2017-02-17 | End: 2019-02-15 | Stop reason: SDUPTHER

## 2018-03-05 NOTE — PROGRESS NOTES
HPI:   Courtney Rodriguez is a 50 y o  female with  Hormone receptor positive, her 2-, stage I cancer in the upper outer quadrant of left breast status post lumpectomy, radiation and patient is on adjuvant tamoxifen  In January 2017 patient had lumpectomy and sentinel lymph node sampling for hormone receptor positive, HER-2 negative, G1, T1c, 1 4 cm, stage I invasive mammary carcinoma in the left breast    Oncotype score was 10  BRCA test was negative  Since February 2017 she has been on tamoxifen  She received radiation  She has noticed some thinning of the hair on tamoxifen  Hot flashes  Arthritic symptoms  Varicose veins right leg  Mood swings  Patient is premenopausal  She is going through the change of life because menstrual periods are becoming less frequent     She is taking vitamin D and calcium and baby aspirin 3 days a week      Current Outpatient Prescriptions:     ALPRAZolam (XANAX) 0 25 mg tablet, Take 0 25 mg by mouth daily as needed for anxiety, Disp: , Rfl:     Calcium 500 MG tablet, Take 1 tablet by mouth daily, Disp: , Rfl:     COLLAGEN PO, Take 1 tablet by mouth daily, Disp: , Rfl:     fexofenadine (ALLEGRA) 180 MG tablet, Take 180 mg by mouth, Disp: , Rfl:     Ibuprofen 200 MG CAPS, Take by mouth as needed, Disp: , Rfl:     MILK THISTLE PO, Take 1 tablet by mouth daily, Disp: , Rfl:     Multiple Vitamins-Minerals (MULTIVITAMIN ADULT PO), Take 1 tablet by mouth daily, Disp: , Rfl:     tamoxifen (NOLVADEX) 20 mg tablet, Take 1 tablet by mouth daily, Disp: , Rfl:     Allergies   Allergen Reactions    Other Other (See Comments)     Scallops-pt states she has swelling and vomiting       ROS:  03/05/18 Reviewed 13 systems:  Presently no headaches, seizures, dizziness, diplopia, dysphagia, hoarseness, chest pain, palpitations, shortness of breath, cough, hemoptysis, abdominal pain, nausea, vomiting, change in bowel habits, melena, hematuria, fever, chills, bleeding, bone pains, skin rash, weight loss,  numbness, tiredness , weakness, claudication and gait problem  No frequent infections  Not unusually sensitive to heat or cold  Patient is anxious  No swelling of the ankles  No swollen glands  No other gyn symptoms  Other symptoms as in HPI  /62 (BP Location: Left arm, Cuff Size: Adult)   Pulse 82   Temp 98 6 °F (37 °C) (Tympanic)   Resp 16   Ht 5' 6" (1 676 m)   Wt 54 kg (119 lb)   SpO2 98%   BMI 19 21 kg/m²     Physical Exam:  Alert, oriented, not in distress, no icterus, no oral thrush, no palpable neck mass, clear lung fields, regular heart rate, abdomen  soft and non tender, no palpable abdominal mass, no ascites, no edema of ankles, no calf tenderness, no focal neurological deficit, no skin rash, no palpable lymphadenopathy, good arterial pulses, no clubbing  Patient is anxious  No lymphedema  She goes to her breast surgeon for examination of breasts and mammography  Performance status 0      IMAGING:  No orders to display       Lab Results  Results for orders placed or performed in visit on 03/03/18   Cancer antigen 27 29   Result Value Ref Range    CA 27 29 18 4 0 0 - 42 3 U/mL   CBC and differential   Result Value Ref Range    WBC 5 27 4 31 - 10 16 Thousand/uL    RBC 4 32 3 81 - 5 12 Million/uL    Hemoglobin 13 0 11 5 - 15 4 g/dL    Hematocrit 39 8 34 8 - 46 1 %    MCV 92 82 - 98 fL    MCH 30 1 26 8 - 34 3 pg    MCHC 32 7 31 4 - 37 4 g/dL    RDW 13 8 11 6 - 15 1 %    MPV 12 4 8 9 - 12 7 fL    Platelets 102 185 - 202 Thousands/uL    nRBC 0 /100 WBCs    Neutrophils Relative 56 43 - 75 %    Lymphocytes Relative 28 14 - 44 %    Monocytes Relative 10 4 - 12 %    Eosinophils Relative 5 0 - 6 %    Basophils Relative 1 0 - 1 %    Neutrophils Absolute 2 94 1 85 - 7 62 Thousands/µL    Lymphocytes Absolute 1 49 0 60 - 4 47 Thousands/µL    Monocytes Absolute 0 53 0 17 - 1 22 Thousand/µL    Eosinophils Absolute 0 24 0 00 - 0 61 Thousand/µL    Basophils Absolute 0 06 0 00 - 0 10 Thousands/µL   Comprehensive metabolic panel   Result Value Ref Range    Sodium 142 136 - 145 mmol/L    Potassium 4 0 3 5 - 5 3 mmol/L    Chloride 106 100 - 108 mmol/L    CO2 30 21 - 32 mmol/L    Anion Gap 6 4 - 13 mmol/L    BUN 13 5 - 25 mg/dL    Creatinine 0 66 0 60 - 1 30 mg/dL    Glucose 72 65 - 140 mg/dL    Calcium 9 4 8 3 - 10 1 mg/dL    AST 24 5 - 45 U/L    ALT 29 12 - 78 U/L    Alkaline Phosphatase 50 46 - 116 U/L    Total Protein 7 5 6 4 - 8 2 g/dL    Albumin 3 8 3 5 - 5 0 g/dL    Total Bilirubin 0 57 0 20 - 1 00 mg/dL    eGFR 103 ml/min/1 73sq m       Results from last 7 days  Lab Units 03/03/18  0934   WBC Thousand/uL 5 27   HEMOGLOBIN g/dL 13 0   HEMATOCRIT % 39 8   PLATELETS Thousands/uL 267   SODIUM mmol/L 142   POTASSIUM mmol/L 4 0   CHLORIDE mmol/L 106   CO2 mmol/L 30   BUN mg/dL 13   CREATININE mg/dL 0 66   CALCIUM mg/dL 9 4   TOTAL PROTEIN g/dL 7 5   GLUCOSE RANDOM mg/dL 72       Reviewed and discussed with patient  Assessment and plan:  Adelaida Garrido is a 50 y o  female with   Hormone receptor positive, her 2-, stage I cancer in the upper outer quadrant of left breast status post lumpectomy, radiation and patient is on adjuvant tamoxifen  In January 2017 patient had lumpectomy and sentinel lymph node sampling for hormone receptor positive, HER-2 negative, G1, T1c, 1 4 cm, stage I invasive mammary carcinoma in the left breast    Oncotype score was 10  BRCA test was negative  Since February 2017 she has been on tamoxifen  She received radiation  Breast cancer is in remission  She stays on tamoxifen  She wants to go through natural postmenopausal stage and not be made postmenopausal     We discussed self-breast examination, eating healthy foods, exercises, health maintenance screening tests including colonoscopy    All discussed in detail  Questions answered  Nina Redman   She will come back in 4 months with blood counts, chemistry and CA 27-29  Patient voiced understanding and agreement in the discussion  Counseling / Coordination of Care   Greater than 50% of total time was spent with the patient and / or family counseling and / or coordination of care

## 2018-03-05 NOTE — LETTER
March 5, 2018     Xochilt Weaver DO  [address]    Patient: Melani Flood   YOB: 1965   Date of Visit: 3/5/2018       Dear Dr Yasmin Haro: Thank you for referring Justen Morjhoan to me for evaluation  Below are my notes for this consultation  If you have questions, please do not hesitate to call me  I look forward to following your patient along with you  Sincerely,        Alvin Coy MD        CC: No Recipients  Alvin Coy MD  3/5/2018 10:49 AM  Sign at close encounter    HPI:   Melani Flood is a 50 y o  female with  Hormone receptor positive, her 2-, stage I cancer in the upper outer quadrant of left breast status post lumpectomy, radiation and patient is on adjuvant tamoxifen  In January 2017 patient had lumpectomy and sentinel lymph node sampling for hormone receptor positive, HER-2 negative, G1, T1c, 1 4 cm, stage I invasive mammary carcinoma in the left breast    Oncotype score was 10  BRCA test was negative  Since February 2017 she has been on tamoxifen  She received radiation  She has noticed some thinning of the hair on tamoxifen  Hot flashes  Arthritic symptoms  Varicose veins right leg  Mood swings  Patient is premenopausal  She is going through the change of life because menstrual periods are becoming less frequent     She is taking vitamin D and calcium and baby aspirin 3 days a week      Current Outpatient Prescriptions:     ALPRAZolam (XANAX) 0 25 mg tablet, Take 0 25 mg by mouth daily as needed for anxiety, Disp: , Rfl:     Calcium 500 MG tablet, Take 1 tablet by mouth daily, Disp: , Rfl:     COLLAGEN PO, Take 1 tablet by mouth daily, Disp: , Rfl:     fexofenadine (ALLEGRA) 180 MG tablet, Take 180 mg by mouth, Disp: , Rfl:     Ibuprofen 200 MG CAPS, Take by mouth as needed, Disp: , Rfl:     MILK THISTLE PO, Take 1 tablet by mouth daily, Disp: , Rfl:     Multiple Vitamins-Minerals (MULTIVITAMIN ADULT PO), Take 1 tablet by mouth daily, Disp: , Rfl:     tamoxifen (NOLVADEX) 20 mg tablet, Take 1 tablet by mouth daily, Disp: , Rfl:     Allergies   Allergen Reactions    Other Other (See Comments)     Scallops-pt states she has swelling and vomiting       ROS:  03/05/18 Reviewed 13 systems:  Presently no headaches, seizures, dizziness, diplopia, dysphagia, hoarseness, chest pain, palpitations, shortness of breath, cough, hemoptysis, abdominal pain, nausea, vomiting, change in bowel habits, melena, hematuria, fever, chills, bleeding, bone pains, skin rash, weight loss,  numbness, tiredness , weakness, claudication and gait problem  No frequent infections  Not unusually sensitive to heat or cold  Patient is anxious  No swelling of the ankles  No swollen glands  No other gyn symptoms  Other symptoms as in HPI  /62 (BP Location: Left arm, Cuff Size: Adult)   Pulse 82   Temp 98 6 °F (37 °C) (Tympanic)   Resp 16   Ht 5' 6" (1 676 m)   Wt 54 kg (119 lb)   SpO2 98%   BMI 19 21 kg/m²      Physical Exam:  Alert, oriented, not in distress, no icterus, no oral thrush, no palpable neck mass, clear lung fields, regular heart rate, abdomen  soft and non tender, no palpable abdominal mass, no ascites, no edema of ankles, no calf tenderness, no focal neurological deficit, no skin rash, no palpable lymphadenopathy, good arterial pulses, no clubbing  Patient is anxious  No lymphedema  She goes to her breast surgeon for examination of breasts and mammography  Performance status 0      IMAGING:  No orders to display       Lab Results  Results for orders placed or performed in visit on 03/03/18   Cancer antigen 27 29   Result Value Ref Range    CA 27 29 18 4 0 0 - 42 3 U/mL   CBC and differential   Result Value Ref Range    WBC 5 27 4 31 - 10 16 Thousand/uL    RBC 4 32 3 81 - 5 12 Million/uL    Hemoglobin 13 0 11 5 - 15 4 g/dL    Hematocrit 39 8 34 8 - 46 1 %    MCV 92 82 - 98 fL    MCH 30 1 26 8 - 34 3 pg    MCHC 32 7 31 4 - 37 4 g/dL RDW 13 8 11 6 - 15 1 %    MPV 12 4 8 9 - 12 7 fL    Platelets 152 519 - 702 Thousands/uL    nRBC 0 /100 WBCs    Neutrophils Relative 56 43 - 75 %    Lymphocytes Relative 28 14 - 44 %    Monocytes Relative 10 4 - 12 %    Eosinophils Relative 5 0 - 6 %    Basophils Relative 1 0 - 1 %    Neutrophils Absolute 2 94 1 85 - 7 62 Thousands/µL    Lymphocytes Absolute 1 49 0 60 - 4 47 Thousands/µL    Monocytes Absolute 0 53 0 17 - 1 22 Thousand/µL    Eosinophils Absolute 0 24 0 00 - 0 61 Thousand/µL    Basophils Absolute 0 06 0 00 - 0 10 Thousands/µL   Comprehensive metabolic panel   Result Value Ref Range    Sodium 142 136 - 145 mmol/L    Potassium 4 0 3 5 - 5 3 mmol/L    Chloride 106 100 - 108 mmol/L    CO2 30 21 - 32 mmol/L    Anion Gap 6 4 - 13 mmol/L    BUN 13 5 - 25 mg/dL    Creatinine 0 66 0 60 - 1 30 mg/dL    Glucose 72 65 - 140 mg/dL    Calcium 9 4 8 3 - 10 1 mg/dL    AST 24 5 - 45 U/L    ALT 29 12 - 78 U/L    Alkaline Phosphatase 50 46 - 116 U/L    Total Protein 7 5 6 4 - 8 2 g/dL    Albumin 3 8 3 5 - 5 0 g/dL    Total Bilirubin 0 57 0 20 - 1 00 mg/dL    eGFR 103 ml/min/1 73sq m       Results from last 7 days  Lab Units 03/03/18  0934   WBC Thousand/uL 5 27   HEMOGLOBIN g/dL 13 0   HEMATOCRIT % 39 8   PLATELETS Thousands/uL 267   SODIUM mmol/L 142   POTASSIUM mmol/L 4 0   CHLORIDE mmol/L 106   CO2 mmol/L 30   BUN mg/dL 13   CREATININE mg/dL 0 66   CALCIUM mg/dL 9 4   TOTAL PROTEIN g/dL 7 5   GLUCOSE RANDOM mg/dL 72       Reviewed and discussed with patient  Assessment and plan:  Heather Williamson is a 50 y o  female with   Hormone receptor positive, her 2-, stage I cancer in the upper outer quadrant of left breast status post lumpectomy, radiation and patient is on adjuvant tamoxifen  In January 2017 patient had lumpectomy and sentinel lymph node sampling for hormone receptor positive, HER-2 negative, G1, T1c, 1 4 cm, stage I invasive mammary carcinoma in the left breast    Oncotype score was 10   BRCA test was negative  Since February 2017 she has been on tamoxifen  She received radiation  Breast cancer is in remission  She stays on tamoxifen  She wants to go through natural postmenopausal stage and not be made postmenopausal     We discussed self-breast examination, eating healthy foods, exercises, health maintenance screening tests including colonoscopy    All discussed in detail  Questions answered  Mindy Christianson She will come back in 4 months with blood counts, chemistry and CA 27-29  Patient voiced understanding and agreement in the discussion  Counseling / Coordination of Care   Greater than 50% of total time was spent with the patient and / or family counseling and / or coordination of care

## 2018-03-05 NOTE — LETTER
March 5, 2018     Nicole Fine DO  [address]    Patient: Lisa Lynn   YOB: 1965   Date of Visit: 3/5/2018       Dear Dr Ashly Marquez: Thank you for referring Kaylyn Brothers to me for evaluation  Below are my notes for this consultation  If you have questions, please do not hesitate to call me  I look forward to following your patient along with you  Sincerely,        Sung Short MD        CC: DO Sung Shelton MD  3/5/2018 10:49 AM  Sign at close encounter    HPI:   Lisa Lynn is a 50 y o  female with  Hormone receptor positive, her 2-, stage I cancer in the upper outer quadrant of left breast status post lumpectomy, radiation and patient is on adjuvant tamoxifen  In January 2017 patient had lumpectomy and sentinel lymph node sampling for hormone receptor positive, HER-2 negative, G1, T1c, 1 4 cm, stage I invasive mammary carcinoma in the left breast    Oncotype score was 10  BRCA test was negative  Since February 2017 she has been on tamoxifen  She received radiation  She has noticed some thinning of the hair on tamoxifen  Hot flashes  Arthritic symptoms  Varicose veins right leg  Mood swings  Patient is premenopausal  She is going through the change of life because menstrual periods are becoming less frequent     She is taking vitamin D and calcium and baby aspirin 3 days a week      Current Outpatient Prescriptions:     ALPRAZolam (XANAX) 0 25 mg tablet, Take 0 25 mg by mouth daily as needed for anxiety, Disp: , Rfl:     Calcium 500 MG tablet, Take 1 tablet by mouth daily, Disp: , Rfl:     COLLAGEN PO, Take 1 tablet by mouth daily, Disp: , Rfl:     fexofenadine (ALLEGRA) 180 MG tablet, Take 180 mg by mouth, Disp: , Rfl:     Ibuprofen 200 MG CAPS, Take by mouth as needed, Disp: , Rfl:     MILK THISTLE PO, Take 1 tablet by mouth daily, Disp: , Rfl:     Multiple Vitamins-Minerals (MULTIVITAMIN ADULT PO), Take 1 tablet by mouth daily, Disp: , Rfl:     tamoxifen (NOLVADEX) 20 mg tablet, Take 1 tablet by mouth daily, Disp: , Rfl:     Allergies   Allergen Reactions    Other Other (See Comments)     Scallops-pt states she has swelling and vomiting       ROS:  03/05/18 Reviewed 13 systems:  Presently no headaches, seizures, dizziness, diplopia, dysphagia, hoarseness, chest pain, palpitations, shortness of breath, cough, hemoptysis, abdominal pain, nausea, vomiting, change in bowel habits, melena, hematuria, fever, chills, bleeding, bone pains, skin rash, weight loss,  numbness, tiredness , weakness, claudication and gait problem  No frequent infections  Not unusually sensitive to heat or cold  Patient is anxious  No swelling of the ankles  No swollen glands  No other gyn symptoms  Other symptoms as in HPI  /62 (BP Location: Left arm, Cuff Size: Adult)   Pulse 82   Temp 98 6 °F (37 °C) (Tympanic)   Resp 16   Ht 5' 6" (1 676 m)   Wt 54 kg (119 lb)   SpO2 98%   BMI 19 21 kg/m²      Physical Exam:  Alert, oriented, not in distress, no icterus, no oral thrush, no palpable neck mass, clear lung fields, regular heart rate, abdomen  soft and non tender, no palpable abdominal mass, no ascites, no edema of ankles, no calf tenderness, no focal neurological deficit, no skin rash, no palpable lymphadenopathy, good arterial pulses, no clubbing  Patient is anxious  No lymphedema  She goes to her breast surgeon for examination of breasts and mammography  Performance status 0      IMAGING:  No orders to display       Lab Results  Results for orders placed or performed in visit on 03/03/18   Cancer antigen 27 29   Result Value Ref Range    CA 27 29 18 4 0 0 - 42 3 U/mL   CBC and differential   Result Value Ref Range    WBC 5 27 4 31 - 10 16 Thousand/uL    RBC 4 32 3 81 - 5 12 Million/uL    Hemoglobin 13 0 11 5 - 15 4 g/dL    Hematocrit 39 8 34 8 - 46 1 %    MCV 92 82 - 98 fL    MCH 30 1 26 8 - 34 3 pg    MCHC 32 7 31 4 - 37 4 g/dL    RDW 13 8 11 6 - 15 1 %    MPV 12 4 8 9 - 12 7 fL    Platelets 800 476 - 655 Thousands/uL    nRBC 0 /100 WBCs    Neutrophils Relative 56 43 - 75 %    Lymphocytes Relative 28 14 - 44 %    Monocytes Relative 10 4 - 12 %    Eosinophils Relative 5 0 - 6 %    Basophils Relative 1 0 - 1 %    Neutrophils Absolute 2 94 1 85 - 7 62 Thousands/µL    Lymphocytes Absolute 1 49 0 60 - 4 47 Thousands/µL    Monocytes Absolute 0 53 0 17 - 1 22 Thousand/µL    Eosinophils Absolute 0 24 0 00 - 0 61 Thousand/µL    Basophils Absolute 0 06 0 00 - 0 10 Thousands/µL   Comprehensive metabolic panel   Result Value Ref Range    Sodium 142 136 - 145 mmol/L    Potassium 4 0 3 5 - 5 3 mmol/L    Chloride 106 100 - 108 mmol/L    CO2 30 21 - 32 mmol/L    Anion Gap 6 4 - 13 mmol/L    BUN 13 5 - 25 mg/dL    Creatinine 0 66 0 60 - 1 30 mg/dL    Glucose 72 65 - 140 mg/dL    Calcium 9 4 8 3 - 10 1 mg/dL    AST 24 5 - 45 U/L    ALT 29 12 - 78 U/L    Alkaline Phosphatase 50 46 - 116 U/L    Total Protein 7 5 6 4 - 8 2 g/dL    Albumin 3 8 3 5 - 5 0 g/dL    Total Bilirubin 0 57 0 20 - 1 00 mg/dL    eGFR 103 ml/min/1 73sq m       Results from last 7 days  Lab Units 03/03/18  0934   WBC Thousand/uL 5 27   HEMOGLOBIN g/dL 13 0   HEMATOCRIT % 39 8   PLATELETS Thousands/uL 267   SODIUM mmol/L 142   POTASSIUM mmol/L 4 0   CHLORIDE mmol/L 106   CO2 mmol/L 30   BUN mg/dL 13   CREATININE mg/dL 0 66   CALCIUM mg/dL 9 4   TOTAL PROTEIN g/dL 7 5   GLUCOSE RANDOM mg/dL 72       Reviewed and discussed with patient  Assessment and plan:  Jessica Westfall is a 50 y o  female with   Hormone receptor positive, her 2-, stage I cancer in the upper outer quadrant of left breast status post lumpectomy, radiation and patient is on adjuvant tamoxifen  In January 2017 patient had lumpectomy and sentinel lymph node sampling for hormone receptor positive, HER-2 negative, G1, T1c, 1 4 cm, stage I invasive mammary carcinoma in the left breast    Oncotype score was 10   BRCA test was negative  Since February 2017 she has been on tamoxifen  She received radiation  Breast cancer is in remission  She stays on tamoxifen  She wants to go through natural postmenopausal stage and not be made postmenopausal     We discussed self-breast examination, eating healthy foods, exercises, health maintenance screening tests including colonoscopy    All discussed in detail  Questions answered  Gerhardt Sep She will come back in 4 months with blood counts, chemistry and CA 27-29  Patient voiced understanding and agreement in the discussion  Counseling / Coordination of Care   Greater than 50% of total time was spent with the patient and / or family counseling and / or coordination of care

## 2018-03-26 ENCOUNTER — OFFICE VISIT (OUTPATIENT)
Dept: GYNECOLOGY | Facility: CLINIC | Age: 53
End: 2018-03-26
Payer: COMMERCIAL

## 2018-03-26 VITALS
DIASTOLIC BLOOD PRESSURE: 68 MMHG | BODY MASS INDEX: 19.29 KG/M2 | HEIGHT: 66 IN | HEART RATE: 65 BPM | WEIGHT: 120 LBS | SYSTOLIC BLOOD PRESSURE: 110 MMHG

## 2018-03-26 DIAGNOSIS — R19.5 NARROWING OF STOOLS: ICD-10-CM

## 2018-03-26 DIAGNOSIS — K64.4 EXTERNAL HEMORRHOID: ICD-10-CM

## 2018-03-26 DIAGNOSIS — R10.31 RIGHT LOWER QUADRANT PAIN: Primary | ICD-10-CM

## 2018-03-26 DIAGNOSIS — K62.89 RECTAL PAIN: ICD-10-CM

## 2018-03-26 PROCEDURE — 99213 OFFICE O/P EST LOW 20 MIN: CPT | Performed by: OBSTETRICS & GYNECOLOGY

## 2018-03-27 NOTE — PROGRESS NOTES
Assessment/Plan:  Reassurance  She is due for colonoscopy and I recommended this over cologuard which she was considering  Reassured her that the benefits of tamoxifen out weighed the risks       Diagnoses and all orders for this visit:    Right lower quadrant pain    Rectal pain    External hemorrhoid    Narrowing of stools          Subjective:      Patient ID: Nicole Martinez is a 50 y o  female  Nessa Aponte presents with a 1 5-week history of white lower quadrant pain which she describes is pinching and intermittent along with rectal pain and pressure  Her back hurts upon awakening but that resolved shortly after ambulation  Her menses are irregular  Occurring approximately every 2 months  She is on tamoxifen for chemoprophylaxis and hates it "  She has hot flashes once a month  She denies any stress urinary incontinence or urgency  She occasionally strains for a bowel movement  Bowel movements are daily  She has noted recently that they are thinner  She has made no dietary changes  She is concerned she may have a hemorrhoid  Coitus is twice a week without problems  The last time they had sex was 3 days ago  The following portions of the patient's history were reviewed and updated as appropriate:   She  has a past medical history of Anxiety; BRCA negative; Breast cancer ([facility] [address] ); Common cold; Exercises 5 to 6 times per week; Food allergy; History of radiation therapy; and Seasonal allergies  She   Patient Active Problem List    Diagnosis Date Noted    Right lower quadrant pain 03/26/2018    Rectal pain 03/26/2018    External hemorrhoid 03/26/2018    Narrowing of stools 03/26/2018    Malignant neoplasm of upper-outer quadrant of left breast in female, estrogen receptor positive ([facility] [address] ) 01/03/2017     She  has a past surgical history that includes Breast biopsy (Left); Appendectomy; pr mastectomy, partial (Left, 1/3/2017); and pr biopsy/excision, lymph node(s) (Left, 1/3/2017)    Her family history includes Breast cancer in her paternal aunt and sister; Lung cancer in her father and mother; No Known Problems in her brother  She  reports that she has been smoking Cigarettes  She uses smokeless tobacco  She reports that she drinks alcohol  She reports that she does not use drugs  Current Outpatient Prescriptions   Medication Sig Dispense Refill    ALPRAZolam (XANAX) 0 25 mg tablet Take 0 25 mg by mouth daily as needed for anxiety      Calcium 500 MG tablet Take 1 tablet by mouth daily      COLLAGEN PO Take 1 tablet by mouth daily      fexofenadine (ALLEGRA) 180 MG tablet Take 180 mg by mouth      Ibuprofen 200 MG CAPS Take by mouth as needed      MILK THISTLE PO Take 1 tablet by mouth daily      Multiple Vitamins-Minerals (MULTIVITAMIN ADULT PO) Take 1 tablet by mouth daily      tamoxifen (NOLVADEX) 20 mg tablet Take 1 tablet by mouth daily       No current facility-administered medications for this visit  Current Outpatient Prescriptions on File Prior to Visit   Medication Sig    ALPRAZolam (XANAX) 0 25 mg tablet Take 0 25 mg by mouth daily as needed for anxiety    Calcium 500 MG tablet Take 1 tablet by mouth daily    COLLAGEN PO Take 1 tablet by mouth daily    fexofenadine (ALLEGRA) 180 MG tablet Take 180 mg by mouth    Ibuprofen 200 MG CAPS Take by mouth as needed    MILK THISTLE PO Take 1 tablet by mouth daily    Multiple Vitamins-Minerals (MULTIVITAMIN ADULT PO) Take 1 tablet by mouth daily    tamoxifen (NOLVADEX) 20 mg tablet Take 1 tablet by mouth daily     No current facility-administered medications on file prior to visit  She is allergic to other       Review of Systems   Constitutional:        Per HPI         Objective:      /68 (BP Location: Right arm, Patient Position: Sitting, Cuff Size: Standard)   Pulse 65   Ht 5' 6" (1 676 m)   Wt 54 4 kg (120 lb)   LMP 01/02/2018 (Approximate)   Breastfeeding?  No   BMI 19 37 kg/m²          Physical Exam Constitutional: She is oriented to person, place, and time  She appears well-developed and well-nourished  No distress  Pulmonary/Chest: Effort normal    Abdominal: Soft  She exhibits no distension  There is no tenderness  There is no rebound and no guarding  Genitourinary: Vagina normal and uterus normal    Genitourinary Comments: The patient had a history of vaginal relaxation and first-degree uterine prolapse  None of these are apparent today  There is a small tender hemorrhoid at 12 o'clock   Neurological: She is alert and oriented to person, place, and time  Skin: Skin is warm and dry  Psychiatric: She has a normal mood and affect   Her behavior is normal  Judgment and thought content normal

## 2018-03-29 ENCOUNTER — TELEPHONE (OUTPATIENT)
Dept: GYNECOLOGY | Facility: CLINIC | Age: 53
End: 2018-03-29

## 2018-03-29 DIAGNOSIS — K64.9 HEMORRHOIDS, UNSPECIFIED HEMORRHOID TYPE: Primary | ICD-10-CM

## 2018-03-29 NOTE — TELEPHONE ENCOUNTER
Patient would like to know if there is a doctor that you can put a referral in for a colonoscopy and to look at her hemorrhoid?

## 2018-04-19 ENCOUNTER — OFFICE VISIT (OUTPATIENT)
Dept: GYNECOLOGY | Facility: CLINIC | Age: 53
End: 2018-04-19
Payer: COMMERCIAL

## 2018-04-19 VITALS
SYSTOLIC BLOOD PRESSURE: 102 MMHG | HEIGHT: 66 IN | WEIGHT: 122.8 LBS | DIASTOLIC BLOOD PRESSURE: 70 MMHG | BODY MASS INDEX: 19.73 KG/M2

## 2018-04-19 DIAGNOSIS — N89.8 VAGINAL DISCHARGE: Primary | ICD-10-CM

## 2018-04-19 PROCEDURE — 99213 OFFICE O/P EST LOW 20 MIN: CPT | Performed by: NURSE PRACTITIONER

## 2018-04-19 NOTE — PROGRESS NOTES
Assessment/Plan:    Unable to complete STI testing due to the amount of medication that remains in the vagina  Asymptomatic today  If symptoms resurface by next week RTO  Consider RTO for STI testing-will likely return with her annual exam  Follow up with rad oncologist as planned  Colonoscopy scheduled next week  Condom use with coitus to prevent any STI concerns  Diagnoses and all orders for this visit:    Vaginal discharge          Subjective:      Patient ID: Daria Arnold is a 50 y o  female  Patient here for problem visit  States that she is dealing with a rectal skin tag and is having colonosopy next week  Admits to nervousness and anxiety since having breast cancer  C/o greenish yellow discharge x 3 days  Used monistat one day insert 2 days ago which has resolved the vaginal discharge  Using accompanied cream externally with some effectiveness for external itch  Sexually active with   He is asymptomatic  Denies dyspareunia  Desires STI testing  Declines further conversation about concerns  Has follow up with radiation oncologist next month  The following portions of the patient's history were reviewed and updated as appropriate: allergies, current medications, past medical history, past social history, past surgical history and problem list     Review of Systems   Constitutional: Negative  HENT: Negative for sore throat and trouble swallowing  Gastrointestinal: Negative for abdominal pain, constipation, diarrhea, nausea and vomiting  Genitourinary: Negative for dyspareunia, dysuria, genital sores, menstrual problem, pelvic pain, vaginal bleeding, vaginal discharge and vaginal pain  Musculoskeletal: Negative for arthralgias and myalgias  Skin: Negative  Hematological: Negative for adenopathy  Psychiatric/Behavioral: The patient is nervous/anxious  All other systems reviewed and are negative          Objective:      /70 (BP Location: Right arm, Patient Position: Sitting)   Ht 5' 6" (1 676 m)   Wt 55 7 kg (122 lb 12 8 oz)   LMP  (LMP Unknown)   BMI 19 82 kg/m²          Physical Exam   Constitutional: She is oriented to person, place, and time  She appears well-developed and well-nourished  Genitourinary: Uterus normal  Rectal exam shows no external hemorrhoid  No labial fusion  There is no rash, tenderness, lesion or injury on the right labia  There is no rash, tenderness, lesion or injury on the left labia  Cervix exhibits discharge  Cervix exhibits no motion tenderness and no friability  Right adnexum displays no mass, no tenderness and no fullness  Left adnexum displays no mass, no tenderness and no fullness  No erythema, tenderness or bleeding in the vagina  No foreign body in the vagina  No signs of injury around the vagina  Vaginal discharge (copious amount of vagianl medication throughout vaginal  No other discharge or abnormalities noted  ) found  Lymphadenopathy:        Right: No inguinal adenopathy present  Left: No inguinal adenopathy present  Neurological: She is alert and oriented to person, place, and time  Skin: Skin is warm and dry  Psychiatric: She has a normal mood and affect  Nursing note and vitals reviewed

## 2018-04-19 NOTE — PATIENT INSTRUCTIONS
Unable to complete STI testing due to the amount of medication that remains in the vagina  Asymptomatic today  If symptoms resurface by next week RTO  Consider RTO for STI testing-will likely return with her annual exam  Follow up with rad oncologist as planned  Colonoscopy scheduled next week  Condom use with coitus to prevent any STI concerns

## 2018-04-27 ENCOUNTER — TELEPHONE (OUTPATIENT)
Dept: HEMATOLOGY ONCOLOGY | Facility: CLINIC | Age: 53
End: 2018-04-27

## 2018-04-27 DIAGNOSIS — M54.5 ACUTE LOW BACK PAIN, UNSPECIFIED BACK PAIN LATERALITY, WITH SCIATICA PRESENCE UNSPECIFIED: Primary | ICD-10-CM

## 2018-04-27 NOTE — TELEPHONE ENCOUNTER
Tried to call patient to let her know the order for the xray was put in but her voicemail is full and couldn't leave a message for her

## 2018-04-27 NOTE — TELEPHONE ENCOUNTER
Patient is now having lower back pain and is requesting an xray--she and Dr Sherwood Heads spoke of  She would get it at --if ok, please put order into system and patient can be called at [phone]

## 2018-04-27 NOTE — TELEPHONE ENCOUNTER
Order for lumbar spine x-ray was ordered and is in EPIC  Unable to leave a message on patient's cell phone as her mail box is full

## 2018-04-30 DIAGNOSIS — C50.412 MALIGNANT NEOPLASM OF UPPER-OUTER QUADRANT OF LEFT FEMALE BREAST (HCC): ICD-10-CM

## 2018-05-01 ENCOUNTER — HOSPITAL ENCOUNTER (OUTPATIENT)
Dept: RADIOLOGY | Facility: HOSPITAL | Age: 53
Discharge: HOME | End: 2018-05-01
Attending: INTERNAL MEDICINE
Payer: COMMERCIAL

## 2018-05-01 ENCOUNTER — TRANSCRIBE ORDERS (OUTPATIENT)
Dept: RADIOLOGY | Facility: HOSPITAL | Age: 53
End: 2018-05-01

## 2018-05-01 DIAGNOSIS — M54.5 ACUTE LOW BACK PAIN, UNSPECIFIED BACK PAIN LATERALITY, WITH SCIATICA PRESENCE UNSPECIFIED: ICD-10-CM

## 2018-05-01 PROCEDURE — 72082 X-RAY EXAM ENTIRE SPI 2/3 VW: CPT

## 2018-05-07 ENCOUNTER — RADIATION ONCOLOGY FOLLOW-UP (OUTPATIENT)
Dept: RADIATION ONCOLOGY | Facility: HOSPITAL | Age: 53
End: 2018-05-07

## 2018-05-07 ENCOUNTER — APPOINTMENT (OUTPATIENT)
Dept: RADIATION ONCOLOGY | Facility: HOSPITAL | Age: 53
End: 2018-05-07
Attending: RADIOLOGY
Payer: COMMERCIAL

## 2018-05-07 VITALS
WEIGHT: 118.4 LBS | HEART RATE: 70 BPM | HEIGHT: 67 IN | DIASTOLIC BLOOD PRESSURE: 68 MMHG | OXYGEN SATURATION: 99 % | RESPIRATION RATE: 18 BRPM | BODY MASS INDEX: 18.58 KG/M2 | TEMPERATURE: 98.7 F | SYSTOLIC BLOOD PRESSURE: 110 MMHG

## 2018-05-07 DIAGNOSIS — Z17.0 MALIGNANT NEOPLASM OF UPPER-OUTER QUADRANT OF LEFT BREAST IN FEMALE, ESTROGEN RECEPTOR POSITIVE (HCC): Primary | ICD-10-CM

## 2018-05-07 DIAGNOSIS — C50.412 MALIGNANT NEOPLASM OF UPPER-OUTER QUADRANT OF LEFT BREAST IN FEMALE, ESTROGEN RECEPTOR POSITIVE (HCC): Primary | ICD-10-CM

## 2018-05-07 PROCEDURE — 99214 OFFICE O/P EST MOD 30 MIN: CPT | Performed by: RADIOLOGY

## 2018-05-07 PROCEDURE — G0463 HOSPITAL OUTPT CLINIC VISIT: HCPCS | Performed by: RADIOLOGY

## 2018-05-07 NOTE — PROGRESS NOTES
Daphnie Murillo  1965   Ms Rhonda Boogie is a 50 y o  female       Chief Complaint   Patient presents with    Follow-up       Cancer Staging  No matching staging information was found for the patient  Oncology History    Patient presents for radiation therapy follow up for left breast cancer  She completed radiation treatments on 4/24/17  Patient was last seen on 11/20/17  Luis Wylie is a 53-year-old premenopausal woman with T1c N0 grade 1 invasive ductal carcinoma status post breast conservation surgery  Her margins are negative  Her tumor is ER/IA positive HER-2 negative  She had negative genetic testing for BRCA and low recurrence score of 10 on Oncotype  I reviewed with her a course of adjuvant radiation therapy and I recommended whole breast irradiation completed a course of adjuvant radiation therapy for left breast carcinoma on 4/24/17  She has been on Tamoxifen 20 mg daily since February 2017  She has no clinical evidence of recurrence  Her mammogram from 8/7/2017 remained stable  She is on tamoxifen  We did discuss that on her planning CT for her left breast boost 2 very small ( 1-2 mm) lesions were detected in the right lung field  I did review with the radiologist at that time, very low threshold for malignancy  We discussed obtaining follow-up CT scan of the chest which she is agreeable to  She will return for follow-up visit in 6 months  11/27/17- CT chest-  1  Stable 3 mm medial right upper lobe pulmonary nodule, diminished size of of the right lower lobe 3 mm nodule now measuring 2 mm and interval resolution of the subpleural right lung nodule  There are no new or enlarging nodules  Based on current Fleischner Society 2017 Guidelines on incidental pulmonary nodule, patients with a known malignancy are at increased risk of metastasis and should receive followup CT at intervals appropriate for the type of cancer and its risk of pulmonary   metastases    2   Otherwise stable CT of the chest      3/5/18- Med onc Dr Cruz Never f/u- notices thinning of hair, mood swings and arthritic symptoms on Tamoxifen  Patient is premenopausal  She will f/u in 4 months     4/27/18- Patient called Dr Glenn Grullon office with c/o lower back pain  X-ray of the spine was ordered  5/1/18- X-ray Spine  Mild degenerative changes C3-4 through C5-6  Minimal reversal of the normal cervical lordosis    Moderate to severe disc height loss anteriorly at T11-12  Mild thoracolumbar spondylosis  6/4/18- scheduled follow-up with surg onc Dr Echevarria[address]  Malignant neoplasm of upper-outer quadrant of left breast in female, estrogen receptor positive ([facility] [address] )    11/2016 Initial Diagnosis     Malignant neoplasm of upper-outer quadrant of left breast in female, estrogen receptor positive ([facility] [address] )         11/17/2016 Biopsy     Left breast biopsy: invasive and in situ ductal carcinoma          1/3/2017 Surgery     Left lumpectomy and sentinel node biopsy  Final staging: Stage IA IDC, strongly ER/AZ +, HER2-         2017 Genetic Testing     Oncotype score was 10  BRCA test was negative  2/2017 -  Hormone Therapy     Tamoxifen          3/13/2017 - 4/24/2017 Radiation       Plan ID Energy Fractions Dose per Fraction (cGy) Total Dose Delivered (cGy) Elapsed Days   FB L BOOST 9E 5 / 5 200 1,000 6   FB L BREAST 6X 25 / 25 200 5,000 35      Treatment dates:  3/13/2017 - 4/24/2017               Clinical Trial:No    Screening  Tobacco  Current tobacco user: no  If yes, brief counseling provided: No    Hypertension  Hypertension screening performed: yes  Normotensive:  yes  If no, referred to PCP: no    Depression Screening  Screened for depression using PHQ-2: yes    Screened for depression using PHQ-9:  yes  Screening positive or negative:  negative  If score >4, was any of the following actions taken?    Additional evaluation for depression, suicide risk assesment, referral to PCP or psychiatry, medication started: no    Advanced Care Planning for Patients >65 years  Advanced Care Planning Discussed:  yes  Patient named surrogate decision maker or care plan in chart: no    OB/GYN History:    LMP: 3/2018  Currently on Tamoxifen      Health Maintenance   Topic Date Due    HIV SCREENING  1965    COLONOSCOPY  1965    Depression Screening PHQ-9  1979    INFLUENZA VACCINE  2018    DTaP,Tdap,and Td Vaccines (2 - Td) 02/15/2026       Patient Active Problem List   Diagnosis    Malignant neoplasm of upper-outer quadrant of left breast in female, estrogen receptor positive ([facility] [address] )    Right lower quadrant pain    Rectal pain    External hemorrhoid    Narrowing of stools    Vaginal discharge     Past Medical History:   Diagnosis Date    Anxiety     BRCA negative     Breast cancer ([facility] [address] )     left    Common cold     Exercises 5 to 6 times per week     Food allergy     scallops    History of radiation therapy     last assessed: 17    Seasonal allergies      Past Surgical History:   Procedure Laterality Date    APPENDECTOMY      BREAST BIOPSY Left     COLONOSCOPY      WA BIOPSY/EXCISION, LYMPH NODE(S) Left 1/3/2017    Procedure: BIOPSY LYMPH NODE SENTINEL @ [address];  Surgeon: Georgiana Ferrell MD;  Location: [facility];  Service: Surgical Oncology    WA MASTECTOMY, PARTIAL Left 1/3/2017    Procedure: LUMPECTOMY BREAST NEEDLE LOCALIZED @ 0930;  Surgeon: Georgiana Ferrell MD;  Location: [facility];  Service: Surgical Oncology     Family History   Problem Relation Age of Onset    Lung cancer Mother      mother age 61    Lung cancer Father      father age 66    Breast cancer Sister     Breast cancer Paternal Aunt      aunt 50    No Known Problems Brother      Social History     Social History    Marital status: /Civil Union     Spouse name: N/A    Number of children: 2    Years of education: N/A     Occupational History          Social History Main Topics    Smoking status: Former Smoker     Types: Cigarettes     Quit date: 2017    Smokeless tobacco: Never Used    Alcohol use Yes      Comment: occasional     Drug use: No    Sexual activity: Yes     Partners: Male     Other Topics Concern    Not on file     Social History Narrative    Exercises 5-6 times per week    Full time employment    Lives with spouse    No caffeine use    2 children ages 25 and 21       Current Outpatient Prescriptions:     ALPRAZolam (XANAX) 0 25 mg tablet, Take 0 25 mg by mouth daily as needed for anxiety, Disp: , Rfl:     Calcium 500 MG tablet, Take 1 tablet by mouth daily, Disp: , Rfl:     COLLAGEN PO, Take 1 tablet by mouth daily, Disp: , Rfl:     fexofenadine (ALLEGRA) 180 MG tablet, Take 180 mg by mouth, Disp: , Rfl:     Ibuprofen 200 MG CAPS, Take by mouth as needed, Disp: , Rfl:     MILK THISTLE PO, Take 1 tablet by mouth daily, Disp: , Rfl:     Multiple Vitamins-Minerals (MULTIVITAMIN ADULT PO), Take 1 tablet by mouth daily, Disp: , Rfl:     tamoxifen (NOLVADEX) 20 mg tablet, Take 1 tablet by mouth daily, Disp: , Rfl:   Allergies   Allergen Reactions    Other Other (See Comments)     Scallops-pt states she has swelling and vomiting       Review of Systems:  Review of Systems   Constitutional: Negative  HENT: Negative  Eyes: Negative  Respiratory: Negative  Cardiovascular: Negative  Gastrointestinal: Negative  Endocrine: Negative  Genitourinary: Negative  Musculoskeletal: Positive for arthralgias (generalized aches) and back pain (mild)  Skin: Negative  Allergic/Immunologic: Negative  Neurological: Negative  Hematological: Negative  Psychiatric/Behavioral: Negative          Vitals:    05/07/18 0900   BP: 110/68   BP Location: Right arm   Pulse: 70   Resp: 18   Temp: 98 7 °F (37 1 °C)   TempSrc: Temporal   SpO2: 99%   Weight: 53 7 kg (118 lb 6 4 oz)   Height: 5' 6 5" (1 689 m)            Imaging:Xr Spine Entire Ap And Lateral    Result Date: 5/4/2018  Narrative: THORACOLUMBAR SPINE INDICATION:   M54 5: Low back pain  COMPARISON: None VIEWS:  XR SPINE ENTIRE AP AND LATERAL FINDINGS: There is no fracture or pathologic bone lesion  Thoracic vertebral alignment is within normal limits  Mild reversal of the normal cervical lordosis  Mild degenerative changes at C3-4 through C5-6  Moderate to severe disc height loss anteriorly at T11-12  Mild thoracolumbar spondylosis  There is no displacement of the paraspinal line  The pedicles appear intact  Impression: Mild degenerative changes C3-4 through C5-6  Minimal reversal of the normal cervical lordosis  Moderate to severe disc height loss anteriorly at T11-12  Mild thoracolumbar spondylosis   Workstation performed: [id number]

## 2018-05-07 NOTE — PROGRESS NOTES
Follow-up - Radiation Oncology   Jayna Black 1965 50 y o  female [id number]      History of Present Illness   Cancer Staging  Malignant neoplasm of upper-outer quadrant of left breast in female, estrogen receptor positive ([facility] [address] )  Staging form: Breast, AJCC 7th Edition  - Clinical: Stage IA (T1c, N0, M0) - Signed by Cezar Oconnell MD on 5/7/2018      Jayna Black is a 50y o  year old female with a history of with T1c N0 grade 1 invasive ductal carcinoma status post breast conservation surgery  Her margins are negative  Her tumor is ER/NE positive HER-2 negative  She had negative genetic testing for BRCA and low recurrence score of 10 on Oncotype  I reviewed with her a course of adjuvant radiation therapy and I recommended whole breast irradiation  She completed a course of adjuvant radiation therapy for left breast carcinoma on 4/24/17  Interval History:    11/27/17- CT chest-  1   Stable 3 mm medial right upper lobe pulmonary nodule, diminished size of of the right lower lobe 3 mm nodule now measuring 2 mm and interval resolution of the subpleural right lung nodule   There are no new or enlarging nodules   Based on current Fleischner Society 2017 Guidelines on incidental pulmonary nodule, patients with a known malignancy are at increased risk of metastasis and should receive followup CT at intervals appropriate for the type of cancer and its risk of pulmonary   metastases  2   Otherwise stable CT of the chest      3/5/18- Med onc Dr Abe Ricketts f/u- notices thinning of hair, mood swings and arthritic symptoms on Tamoxifen  Patient is premenopausal  She will f/u in 4 months      4/27/18- Patient called Dr Han Chino office with c/o lower back pain  X-ray of the spine was ordered      5/1/18- X-ray Spine  Mild degenerative changes C3-4 through C5-6   Minimal reversal of the normal cervical lordosis    Moderate to severe disc height loss anteriorly at T11-12   Mild thoracolumbar spondylosis        6/4/18- scheduled follow-up with surg onc Dr Fabiana Mccloud           Historical Information      Malignant neoplasm of upper-outer quadrant of left breast in female, estrogen receptor positive ([facility] [address] )    11/2016 Initial Diagnosis     Malignant neoplasm of upper-outer quadrant of left breast in female, estrogen receptor positive ([facility] [address] )         11/17/2016 Biopsy     Left breast biopsy: invasive and in situ ductal carcinoma          1/3/2017 Surgery     Left lumpectomy and sentinel node biopsy  Final staging: Stage IA IDC, strongly ER/RI +, HER2-         2017 Genetic Testing     Oncotype score was 10  BRCA test was negative           2/2017 -  Hormone Therapy     Tamoxifen          3/13/2017 - 4/24/2017 Radiation       Plan ID Energy Fractions Dose per Fraction (cGy) Total Dose Delivered (cGy) Elapsed Days   FB L BOOST 9E 5 / 5 200 1,000 6   FB L BREAST 6X 25 / 25 200 5,000 35      Treatment dates:  3/13/2017 - 4/24/2017               Past Medical History:   Diagnosis Date    Anxiety     BRCA negative     Breast cancer ([facility] [address] )     left    Common cold     Exercises 5 to 6 times per week     Food allergy     scallops    History of radiation therapy     last assessed: 11/6/17    Seasonal allergies      Past Surgical History:   Procedure Laterality Date    APPENDECTOMY      BREAST BIOPSY Left     COLONOSCOPY      RI BIOPSY/EXCISION, LYMPH NODE(S) Left 1/3/2017    Procedure: BIOPSY LYMPH NODE SENTINEL @ [address]  Surgeon: Edison Hicks MD;  [facility];  Service: Surgical Oncology    RI MASTECTOMY, PARTIAL Left 1/3/2017    Procedure: LUMPECTOMY BREAST NEEDLE LOCALIZED @ 0930;  Surgeon: Edison Hicks MD;  [facility];  Service: Surgical Oncology       Social History   History   Alcohol Use    Yes     Comment: occasional      History   Drug Use No     History   Smoking Status    Former Smoker    Types: Cigarettes    Quit date: 2017   Smokeless Tobacco    Never Used Meds/Allergies     Current Outpatient Prescriptions:     ALPRAZolam (XANAX) 0 25 mg tablet, Take 0 25 mg by mouth daily as needed for anxiety, Disp: , Rfl:     Calcium 500 MG tablet, Take 1 tablet by mouth daily, Disp: , Rfl:     COLLAGEN PO, Take 1 tablet by mouth daily, Disp: , Rfl:     fexofenadine (ALLEGRA) 180 MG tablet, Take 180 mg by mouth, Disp: , Rfl:     Ibuprofen 200 MG CAPS, Take by mouth as needed, Disp: , Rfl:     MILK THISTLE PO, Take 1 tablet by mouth daily, Disp: , Rfl:     Multiple Vitamins-Minerals (MULTIVITAMIN ADULT PO), Take 1 tablet by mouth daily, Disp: , Rfl:     tamoxifen (NOLVADEX) 20 mg tablet, Take 1 tablet by mouth daily, Disp: , Rfl:   Allergies   Allergen Reactions    Other Other (See Comments)     Scallops-pt states she has swelling and vomiting         Review of Systems   Constitutional: Negative  HENT: Negative  Eyes: Negative  Respiratory: Negative  Cardiovascular: Negative  Gastrointestinal: Negative  Endocrine: Negative  Genitourinary: Negative  Musculoskeletal: Positive for arthralgias (generalized aches) and back pain (mild)  Skin: Negative  Allergic/Immunologic: Negative  Neurological: Negative  Hematological: Negative  Psychiatric/Behavioral: Negative          Screening  Tobacco  Current tobacco user: no  If yes, brief counseling provided: No     Hypertension  Hypertension screening performed: yes  Normotensive:  yes  If no, referred to PCP: no     Depression Screening  Screened for depression using PHQ-2: yes     Screened for depression using PHQ-9:  yes  Screening positive or negative:  negative  If score >4, was any of the following actions taken?    Additional evaluation for depression, suicide risk assesment, referral to PCP or psychiatry, medication started:  no     Advanced Care Planning for Patients >65 years  Advanced Care Planning Discussed:  yes  Patient named surrogate decision maker or care plan in chart: no     OB/GYN History:    LMP: 3/2018  Currently on Tamoxifen          OBJECTIVE:   /68 (BP Location: Right arm)   Pulse 70   Temp 98 7 °F (37 1 °C) (Temporal)   Resp 18   Ht 5' 6 5" (1 689 m)   Wt 53 7 kg (118 lb 6 4 oz)   LMP  (LMP Unknown)   SpO2 99%   BMI 18 82 kg/m²   Pain Assessment:  0  ECOG/Zubrod/WHO: 0 - Asymptomatic    Physical Exam   Constitutional: She appears well-developed  HENT:   Head: Normocephalic  Hair is normal    Mouth/Throat: Oropharynx is clear and moist    Eyes: EOM are normal  No scleral icterus  Neck: Neck supple  No spinous process tenderness present  No edema present  Cardiovascular: Normal rate and regular rhythm  Pulmonary/Chest: Effort normal and breath sounds normal  No respiratory distress  She has no wheezes  She exhibits no tenderness  There are no suspicious lesions palpable in either breast   The skin in the radiated field is in good condition  No breast or arm edema  Abdominal: Soft  Bowel sounds are normal  She exhibits no distension, no ascites and no mass  There is no hepatosplenomegaly  There is no tenderness  There is no rebound  Genitourinary: No breast swelling or tenderness  Musculoskeletal: Normal range of motion  She exhibits no edema or tenderness  Lymphadenopathy:     She has no cervical adenopathy  She has no axillary adenopathy  Neurological: She is alert  She has normal strength  No cranial nerve deficit  Coordination and gait normal    Skin: Skin is intact  Psychiatric: She has a normal mood and affect  Her speech is normal and behavior is normal             RESULTS    Lab Results: No results found for this or any previous visit (from the past 672 hour(s))  Imaging Studies:Xr Spine Entire Ap And Lateral    Result Date: 2018  Narrative: THORACOLUMBAR SPINE INDICATION:   M54 5: Low back pain  COMPARISON: None VIEWS:  XR SPINE ENTIRE AP AND LATERAL FINDINGS: There is no fracture or pathologic bone lesion  Thoracic vertebral alignment is within normal limits  Mild reversal of the normal cervical lordosis  Mild degenerative changes at C3-4 through C5-6  Moderate to severe disc height loss anteriorly at T11-12  Mild thoracolumbar spondylosis  There is no displacement of the paraspinal line  The pedicles appear intact  Impression: Mild degenerative changes C3-4 through C5-6  Minimal reversal of the normal cervical lordosis  Moderate to severe disc height loss anteriorly at T11-12  Mild thoracolumbar spondylosis  Workstation performed: [id number]           Assessment/Plan:  No orders of the defined types were placed in this encounter  Hola Morales is a 50y o  year old female with stage I left breast carcinoma  Her tumor is ERPR positive, her 2-  She remains on tamoxifen  She states she has musculoskeletal symptoms related to this  She will be due for mammogram in August   She has no clinical evidence of recurrence  She will return for follow-up visit in 6 months  Emily Lopez MD  5/7/2018,10:48 AM    Portions of the record may have been created with voice recognition software   Occasional wrong word or "sound a like" substitutions may have occurred due to the inherent limitations of voice recognition software   Read the chart carefully and recognize, using context, where substitutions have occurred

## 2018-05-28 NOTE — PROGRESS NOTES
Assessment/Plan:     Calcium 1200-1500mg + 600-1000 IU Vit D daily  Pap with HR HPV q 3 years-due 2020  Annual mammogram-ordered by Dr Asher Denise  Colonoscopy-done  Monthly BSE  Exercise 150 minutes per week minimum  Kegels 20 times twice daily  If unable to perform kegels with attempting to stop urine flow only a few times, call office for referral to PF PT  Silicone based lubricant with sex  Diagnoses and all orders for this visit:    Encntr for gyn exam (general) (routine) w abnormal findings    Vaginal relaxation    Routine screening for STI (sexually transmitted infection)  -     Chlamydia/GC amplified DNA by PCR              Subjective:      Patient ID: Valarie Oconnell is a 50 y o  female  Valarie Oconnell is a 50 y o  female who is here today for her annual visit  No health concerns  Relieved by normal xray of spine  Has follow up with Dr Asher Denise in one week and Dr Doug Patel 7/18  Taking tamoxifen and believes it is causing her pain and anxiety  No menses x 3 months  Valarie Oconnell is sexually active with male partner of 23 years  No dyspareunia  Desires STI testing  Denies bladder or bowel concerns  The following portions of the patient's history were reviewed and updated as appropriate: allergies, current medications, past family history, past medical history, past social history, past surgical history and problem list     Review of Systems   Constitutional: Negative  Negative for activity change, appetite change, chills, diaphoresis, fatigue, fever and unexpected weight change  HENT: Negative for congestion, dental problem, sneezing, sore throat and trouble swallowing  Eyes: Negative for visual disturbance  Respiratory: Negative for chest tightness and shortness of breath  Cardiovascular: Negative for chest pain and leg swelling  Gastrointestinal: Negative for abdominal pain, constipation, diarrhea, nausea and vomiting     Genitourinary: Negative for difficulty urinating, dyspareunia, dysuria, frequency, hematuria, menstrual problem, pelvic pain, urgency, vaginal discharge and vaginal pain  Musculoskeletal: Negative for back pain and neck pain  Skin: Negative  Allergic/Immunologic: Negative  Neurological: Negative for weakness and headaches  Hematological: Negative for adenopathy  Psychiatric/Behavioral: Negative  Objective:      /60 (BP Location: Right arm, Patient Position: Sitting)   Pulse 71   Ht 5' 6 5" (1 689 m)   Wt 54 9 kg (121 lb)   LMP  (LMP Unknown)   BMI 19 24 kg/m²          Physical Exam   Constitutional: She is oriented to person, place, and time  She appears well-developed and well-nourished  HENT:   Head: Normocephalic and atraumatic  Eyes: Right eye exhibits no discharge  Left eye exhibits no discharge  Neck: Normal range of motion  Neck supple  Cardiovascular: Normal rate, regular rhythm, normal heart sounds and intact distal pulses  Pulmonary/Chest: Effort normal and breath sounds normal    Abdominal: Soft  Genitourinary: Vagina normal and uterus normal  Rectal exam shows no external hemorrhoid  No breast swelling, tenderness, discharge or bleeding  No labial fusion  There is no rash, tenderness, lesion or injury on the right labia  There is no rash, tenderness, lesion or injury on the left labia  Cervix exhibits no motion tenderness, no discharge and no friability  Right adnexum displays no mass, no tenderness and no fullness  Left adnexum displays no mass, no tenderness and no fullness  No erythema, tenderness or bleeding in the vagina  No foreign body in the vagina  No signs of injury around the vagina  No vaginal discharge found  Genitourinary Comments: RV uterus  Unable to contract pelvic floor for a kegel   Musculoskeletal: Normal range of motion  Lymphadenopathy:     She has no cervical adenopathy  Right: No inguinal adenopathy present  Left: No inguinal adenopathy present     Neurological: She is alert and oriented to person, place, and time  Skin: Skin is warm and dry  Psychiatric: She has a normal mood and affect  Nursing note and vitals reviewed

## 2018-05-29 ENCOUNTER — ANNUAL EXAM (OUTPATIENT)
Dept: GYNECOLOGY | Facility: CLINIC | Age: 53
End: 2018-05-29
Payer: COMMERCIAL

## 2018-05-29 VITALS
BODY MASS INDEX: 18.99 KG/M2 | HEIGHT: 67 IN | SYSTOLIC BLOOD PRESSURE: 110 MMHG | HEART RATE: 71 BPM | DIASTOLIC BLOOD PRESSURE: 60 MMHG | WEIGHT: 121 LBS

## 2018-05-29 DIAGNOSIS — Z11.3 ROUTINE SCREENING FOR STI (SEXUALLY TRANSMITTED INFECTION): ICD-10-CM

## 2018-05-29 DIAGNOSIS — Z01.411 ENCNTR FOR GYN EXAM (GENERAL) (ROUTINE) W ABNORMAL FINDINGS: Primary | ICD-10-CM

## 2018-05-29 DIAGNOSIS — N81.89 VAGINAL RELAXATION: ICD-10-CM

## 2018-05-29 PROCEDURE — 87491 CHLMYD TRACH DNA AMP PROBE: CPT | Performed by: NURSE PRACTITIONER

## 2018-05-29 PROCEDURE — 87591 N.GONORRHOEAE DNA AMP PROB: CPT | Performed by: NURSE PRACTITIONER

## 2018-05-29 PROCEDURE — S0612 ANNUAL GYNECOLOGICAL EXAMINA: HCPCS | Performed by: NURSE PRACTITIONER

## 2018-05-29 NOTE — PATIENT INSTRUCTIONS
Calcium 1200-1500mg + 600-1000 IU Vit D daily  Pap with HR HPV q 3 years-due 2020  Annual mammogram-ordered by Dr Angela Guzman  Colonoscopy-done  Monthly BSE  Exercise 150 minutes per week minimum  Kegels 20 times twice daily  If unable to perform kegels with attempting to stop urine flow only a few times, call office for referral to PF PT   Silicone based lubricant with sex

## 2018-05-31 LAB
CHLAMYDIA DNA CVX QL NAA+PROBE: NORMAL
N GONORRHOEA DNA GENITAL QL NAA+PROBE: NORMAL

## 2018-06-04 ENCOUNTER — OFFICE VISIT (OUTPATIENT)
Dept: SURGICAL ONCOLOGY | Facility: CLINIC | Age: 53
End: 2018-06-04
Payer: COMMERCIAL

## 2018-06-04 VITALS
HEART RATE: 89 BPM | HEIGHT: 66 IN | BODY MASS INDEX: 18.96 KG/M2 | SYSTOLIC BLOOD PRESSURE: 120 MMHG | TEMPERATURE: 98.5 F | DIASTOLIC BLOOD PRESSURE: 70 MMHG | RESPIRATION RATE: 18 BRPM | WEIGHT: 118 LBS

## 2018-06-04 DIAGNOSIS — Z79.810 USE OF TAMOXIFEN (NOLVADEX): ICD-10-CM

## 2018-06-04 DIAGNOSIS — Z17.0 MALIGNANT NEOPLASM OF UPPER-OUTER QUADRANT OF LEFT BREAST IN FEMALE, ESTROGEN RECEPTOR POSITIVE (HCC): Primary | ICD-10-CM

## 2018-06-04 DIAGNOSIS — C50.412 MALIGNANT NEOPLASM OF UPPER-OUTER QUADRANT OF LEFT BREAST IN FEMALE, ESTROGEN RECEPTOR POSITIVE (HCC): Primary | ICD-10-CM

## 2018-06-04 PROBLEM — R92.30 INCONCLUSIVE MAMMOGRAM DUE TO DENSE BREASTS: Status: ACTIVE | Noted: 2018-06-04

## 2018-06-04 PROBLEM — Z13.71 BRCA NEGATIVE: Status: RESOLVED | Noted: 2018-06-04 | Resolved: 2018-06-04

## 2018-06-04 PROBLEM — R92.2 INCONCLUSIVE MAMMOGRAM DUE TO DENSE BREASTS: Status: ACTIVE | Noted: 2018-06-04

## 2018-06-04 PROCEDURE — 99214 OFFICE O/P EST MOD 30 MIN: CPT | Performed by: SURGERY

## 2018-06-04 NOTE — PROGRESS NOTES
Surgical Oncology Follow Up       [facility]  CANCER [facility] SURGICAL ONCOLOGY [address]  [place] [address]    Daria Gut  1965  [id number]  [address]  CANCER [facility] SURGICAL ONCOLOGY [place]  [address] [place] [address]    Chief Complaint   Patient presents with    Breast Cancer     Pt is here for 6 month follow up        Assessment/Plan   Diagnoses and all orders for this visit:    Malignant neoplasm of upper-outer quadrant of left breast in female, estrogen receptor positive ([facility] [address] )  -     Mammo diagnostic bilateral w 3d & cad; Future    Use of tamoxifen (Nolvadex)        Advance Care Planning/Advance Directives:  Did not discuss with the patient  Oncology History:       Malignant neoplasm of upper-outer quadrant of left breast in female, estrogen receptor positive ([facility] [address] )    11/2016 Initial Diagnosis     Malignant neoplasm of upper-outer quadrant of left breast in female, estrogen receptor positive ([facility] [address] )         11/17/2016 Biopsy     Left breast biopsy: invasive and in situ ductal carcinoma          1/3/2017 Surgery     Left lumpectomy and sentinel node biopsy  Final staging: Stage IA IDC, strongly ER/MI +, HER2-         2017 Genetic Testing     Oncotype score was 10  BRCA test was negative  2/2017 -  Hormone Therapy     Tamoxifen          3/13/2017 - 4/24/2017 Radiation       Plan ID Energy Fractions Dose per Fraction (cGy) Total Dose Delivered (cGy) Elapsed Days   FB L BOOST 9E 5 / 5 200 1,000 6   FB L BREAST 6X 25 / 25 200 5,000 35      Treatment dates:  3/13/2017 - 4/24/2017               History of Present Illness: breast cancer follow up  -Interval History:none    Review of Systems:  Review of Systems   Constitutional: Negative for appetite change and fever  Menopause symptoms from the tamoxifen   Eyes: Negative  Respiratory: Negative for shortness of breath  Cardiovascular: Negative  Gastrointestinal: Negative  Endocrine: Negative  Genitourinary: Negative  Musculoskeletal: Positive for back pain (lower)  Negative for arthralgias and myalgias  Skin: Negative  Allergic/Immunologic: Negative  Neurological: Negative  Hematological: Negative  Negative for adenopathy  Does not bruise/bleed easily  Psychiatric/Behavioral: Negative          Patient Active Problem List   Diagnosis    Malignant neoplasm of upper-outer quadrant of left breast in female, estrogen receptor positive ([address] )    Right lower quadrant pain    Rectal pain    External hemorrhoid    Narrowing of stools    Vaginal discharge    Encntr for gyn exam (general) (routine) w abnormal findings    Vaginal relaxation    Routine screening for STI (sexually transmitted infection)    Use of tamoxifen (Nolvadex)    Inconclusive mammogram due to dense breasts     Past Medical History:   Diagnosis Date    Abdominal pain, left lower quadrant     Anxiety     BRCA negative     Common cold     Environmental and seasonal allergies     Exercises 5 to 6 times per week     Food allergy     scallops    Inconclusive mammogram due to dense breasts     Low back pain     Seasonal allergies     Use of tamoxifen (Nolvadex)     Vaginal discharge     Vaginal irritation     Vulvar burning     Vulvar irritation      Past Surgical History:   Procedure Laterality Date    APPENDECTOMY      BREAST BIOPSY Left     COLONOSCOPY      AR BIOPSY/EXCISION, LYMPH NODE(S) Left 1/3/2017    Procedure: BIOPSY LYMPH NODE SENTINEL @ [address];  Surgeon: Joslyn Lin MD;  [facility];  Service: Surgical Oncology    AR MASTECTOMY, PARTIAL Left 1/3/2017    Procedure: LUMPECTOMY BREAST NEEDLE LOCALIZED @ 0930;  Surgeon: Joslyn Lin MD;  [facility];  Service: Surgical Oncology    SENTINEL LYMPH NODE BIOPSY       Family History   Problem Relation Age of Onset    Lung cancer Mother      mother age 61   Shanell Boateng Lung cancer Father      father age 66    Breast cancer Sister      age dx unk    Breast cancer Paternal Aunt      aunt 50    No Known Problems Brother      Social History     Social History    Marital status: /Civil Union     Spouse name: N/A    Number of children: 2    Years of education: N/A     Occupational History          Social History Main Topics    Smoking status: Former Smoker     Types: Cigarettes     Quit date: 2017    Smokeless tobacco: Never Used    Alcohol use Yes      Comment: occasional     Drug use: No    Sexual activity: Yes     Partners: Male     Other Topics Concern    Not on file     Social History Narrative    Exercises 5-6 times per week    Full time employment    Lives with spouse    No caffeine use    2 children ages 25 and 21       Current Outpatient Prescriptions:     ALPRAZolam (XANAX) 0 25 mg tablet, Take 0 25 mg by mouth daily as needed for anxiety, Disp: , Rfl:     Calcium 500 MG tablet, Take 1 tablet by mouth daily, Disp: , Rfl:     COLLAGEN PO, Take 1 tablet by mouth daily, Disp: , Rfl:     Ibuprofen 200 MG CAPS, Take by mouth as needed, Disp: , Rfl:     MILK THISTLE PO, Take 1 tablet by mouth daily, Disp: , Rfl:     Multiple Vitamins-Minerals (MULTIVITAMIN ADULT PO), Take 1 tablet by mouth daily, Disp: , Rfl:     tamoxifen (NOLVADEX) 20 mg tablet, Take 1 tablet by mouth daily, Disp: , Rfl:     fexofenadine (ALLEGRA) 180 MG tablet, Take 180 mg by mouth, Disp: , Rfl:   Allergies   Allergen Reactions    Other Other (See Comments)     Scallops-pt states she has swelling and vomiting  Also seasonal        The following portions of the patient's history were reviewed and updated as appropriate: allergies, current medications, past family history, past medical history, past social history, past surgical history and problem list         Vitals:    06/04/18 0945   BP: 120/70   Pulse: 89   Resp: 18   Temp: 98 5 °F (36 9 °C)       Physical Exam Constitutional: She is oriented to person, place, and time  She appears well-developed and well-nourished  HENT:   Head: Normocephalic and atraumatic  Cardiovascular: Normal heart sounds  Pulmonary/Chest: Breath sounds normal  Right breast exhibits no inverted nipple, no mass, no nipple discharge, no skin change and no tenderness  Left breast exhibits skin change (lumpectomy scar)  Left breast exhibits no inverted nipple, no mass, no nipple discharge and no tenderness  Abdominal: Soft  Lymphadenopathy:        Right axillary: No pectoral and no lateral adenopathy present  Left axillary: No pectoral and no lateral adenopathy present  Right: No supraclavicular adenopathy present  Left: No supraclavicular adenopathy present  Neurological: She is alert and oriented to person, place, and time  Psychiatric: She has a normal mood and affect  Imaging  5/1/18 spine xray no mets    I reviewed the above imaging data  Discussion/Summary:  82-year-old female status post left breast conservation for a stage I invasive ductal carcinoma  She continues on tamoxifen and has side effects but wishes to continue taking the medication  She reports pain in the lower spine and had negative plain films  There are no concerns on examination today  I will make arrangements for her next mammogram due in August   I will see her again in six months or sooner should the need arise

## 2018-06-04 NOTE — LETTER
June 4, 2018     Marianne Chowdhury DO  [address] [facility] [address]    Patient: Zana Melo   YOB: 1965   Date of Visit: 6/4/2018       Dear Dr Sam Dias: Thank you for referring Geremias Myers to me for evaluation  Below are my notes for this consultation  If you have questions, please do not hesitate to call me  I look forward to following your patient along with you  Sincerely,        Arie Garcia MD        CC: No Recipients  Arei Garcia MD  6/4/2018 10:09 AM  Sign at close encounter     Surgical Oncology Follow Up       [address] [address]    Zana Melo  1965  [id number]  [facility] SURGICAL ONCOLOGY [address] [address]    Chief Complaint   Patient presents with    Breast Cancer     Pt is here for 6 month follow up        Assessment/Plan   Diagnoses and all orders for this visit:    Malignant neoplasm of upper-outer quadrant of left breast in female, estrogen receptor positive ([facility] )  -     Mammo diagnostic bilateral w 3d & cad; Future    Use of tamoxifen (Nolvadex)        Advance Care Planning/Advance Directives:  Did not discuss with the patient  Oncology History:       Malignant neoplasm of upper-outer quadrant of left breast in female, estrogen receptor positive ([address] )    11/2016 Initial Diagnosis     Malignant neoplasm of upper-outer quadrant of left breast in female, estrogen receptor positive ([facility] [address] )         11/17/2016 Biopsy     Left breast biopsy: invasive and in situ ductal carcinoma          1/3/2017 Surgery     Left lumpectomy and sentinel node biopsy  Final staging: Stage IA IDC, strongly ER/MT +, HER2-         2017 Genetic Testing     Oncotype score was 10  BRCA test was negative           2/2017 -  Hormone Therapy     Tamoxifen          3/13/2017 - 4/24/2017 Radiation       Plan ID Energy Fractions Dose per Fraction (cGy) Total Dose Delivered (cGy) Elapsed Days   FB L BOOST 9E 5 / 5 200 1,000 6   FB L BREAST 6X 25 / 25 200 5,000 35      Treatment dates:  3/13/2017 - 4/24/2017               History of Present Illness: breast cancer follow up  -Interval History:none    Review of Systems:  Review of Systems   Constitutional: Negative for appetite change and fever  Menopause symptoms from the tamoxifen   Eyes: Negative  Respiratory: Negative for shortness of breath  Cardiovascular: Negative  Gastrointestinal: Negative  Endocrine: Negative  Genitourinary: Negative  Musculoskeletal: Positive for back pain (lower)  Negative for arthralgias and myalgias  Skin: Negative  Allergic/Immunologic: Negative  Neurological: Negative  Hematological: Negative  Negative for adenopathy  Does not bruise/bleed easily  Psychiatric/Behavioral: Negative          Patient Active Problem List   Diagnosis    Malignant neoplasm of upper-outer quadrant of left breast in female, estrogen receptor positive ([address] )    Right lower quadrant pain    Rectal pain    External hemorrhoid    Narrowing of stools    Vaginal discharge    Encntr for gyn exam (general) (routine) w abnormal findings    Vaginal relaxation    Routine screening for STI (sexually transmitted infection)    Use of tamoxifen (Nolvadex)    Inconclusive mammogram due to dense breasts     Past Medical History:   Diagnosis Date    Abdominal pain, left lower quadrant     Anxiety     BRCA negative     Common cold     Environmental and seasonal allergies     Exercises 5 to 6 times per week     Food allergy     scallops    Inconclusive mammogram due to dense breasts     Low back pain     Seasonal allergies     Use of tamoxifen (Nolvadex)     Vaginal discharge     Vaginal irritation     Vulvar burning     Vulvar irritation      Past Surgical History:   Procedure Laterality Date    APPENDECTOMY      BREAST BIOPSY Left     COLONOSCOPY  SC BIOPSY/EXCISION, LYMPH NODE(S) Left 1/3/2017    Procedure: BIOPSY LYMPH NODE SENTINEL @ [address];  Surgeon: Sarahi Moreland MD;  [facility]: [facility];  Service: Surgical Oncology    SC MASTECTOMY, PARTIAL Left 1/3/2017    Procedure: LUMPECTOMY BREAST NEEDLE LOCALIZED @ 0930;  Surgeon: Sarahi Moreland MD;  [facility];  Service: Surgical Oncology    SENTINEL LYMPH NODE BIOPSY       Family History   Problem Relation Age of Onset    Lung cancer Mother      mother age 61    Lung cancer Father      father age 66    Breast cancer Sister      age dx unk    Breast cancer Paternal Aunt      aunt 50    No Known Problems Brother      Social History     Social History    Marital status: /Civil Union     Spouse name: N/A    Number of children: 2    Years of education: N/A     Occupational History          Social History Main Topics    Smoking status: Former Smoker     Types: Cigarettes     Quit date: 2017    Smokeless tobacco: Never Used    Alcohol use Yes      Comment: occasional     Drug use: No    Sexual activity: Yes     Partners: Male     Other Topics Concern    Not on file     Social History Narrative    Exercises 5-6 times per week    Full time employment    Lives with spouse    No caffeine use    2 children ages 25 and 21       Current Outpatient Prescriptions:     ALPRAZolam (XANAX) 0 25 mg tablet, Take 0 25 mg by mouth daily as needed for anxiety, Disp: , Rfl:     Calcium 500 MG tablet, Take 1 tablet by mouth daily, Disp: , Rfl:     COLLAGEN PO, Take 1 tablet by mouth daily, Disp: , Rfl:     Ibuprofen 200 MG CAPS, Take by mouth as needed, Disp: , Rfl:     MILK THISTLE PO, Take 1 tablet by mouth daily, Disp: , Rfl:     Multiple Vitamins-Minerals (MULTIVITAMIN ADULT PO), Take 1 tablet by mouth daily, Disp: , Rfl:     tamoxifen (NOLVADEX) 20 mg tablet, Take 1 tablet by mouth daily, Disp: , Rfl:     fexofenadine (ALLEGRA) 180 MG tablet, Take 180 mg by mouth, Disp: , Rfl:   Allergies   Allergen Reactions    Other Other (See Comments)     Scallops-pt states she has swelling and vomiting  Also seasonal        The following portions of the patient's history were reviewed and updated as appropriate: allergies, current medications, past family history, past medical history, past social history, past surgical history and problem list         Vitals:    06/04/18 0945   BP: 120/70   Pulse: 89   Resp: 18   Temp: 98 5 °F (36 9 °C)       Physical Exam   Constitutional: She is oriented to person, place, and time  She appears well-developed and well-nourished  HENT:   Head: Normocephalic and atraumatic  Cardiovascular: Normal heart sounds  Pulmonary/Chest: Breath sounds normal  Right breast exhibits no inverted nipple, no mass, no nipple discharge, no skin change and no tenderness  Left breast exhibits skin change (lumpectomy scar)  Left breast exhibits no inverted nipple, no mass, no nipple discharge and no tenderness  Abdominal: Soft  Lymphadenopathy:        Right axillary: No pectoral and no lateral adenopathy present  Left axillary: No pectoral and no lateral adenopathy present  Right: No supraclavicular adenopathy present  Left: No supraclavicular adenopathy present  Neurological: She is alert and oriented to person, place, and time  Psychiatric: She has a normal mood and affect  Imaging  5/1/18 spine xray no mets    I reviewed the above imaging data  Discussion/Summary:  61-year-old female status post left breast conservation for a stage I invasive ductal carcinoma  She continues on tamoxifen and has side effects but wishes to continue taking the medication  She reports pain in the lower spine and had negative plain films  There are no concerns on examination today  I will make arrangements for her next mammogram due in August   I will see her again in six months or sooner should the need arise

## 2018-06-19 ENCOUNTER — CONSULT (OUTPATIENT)
Dept: VASCULAR SURGERY | Facility: CLINIC | Age: 53
End: 2018-06-19
Payer: COMMERCIAL

## 2018-06-19 VITALS
HEIGHT: 66 IN | HEART RATE: 72 BPM | BODY MASS INDEX: 18.8 KG/M2 | TEMPERATURE: 99.3 F | DIASTOLIC BLOOD PRESSURE: 68 MMHG | SYSTOLIC BLOOD PRESSURE: 126 MMHG | RESPIRATION RATE: 18 BRPM | WEIGHT: 117 LBS

## 2018-06-19 DIAGNOSIS — I83.811 VARICOSE VEINS OF RIGHT LOWER EXTREMITY WITH PAIN: Primary | ICD-10-CM

## 2018-06-19 PROCEDURE — 99244 OFF/OP CNSLTJ NEW/EST MOD 40: CPT | Performed by: NURSE PRACTITIONER

## 2018-06-19 NOTE — PATIENT INSTRUCTIONS
Symptomatic varicose veins to the right leg with intermittent pressure type pain  -Wear 20-30 mmHg knee-high compression stockings on a daily basis to help manage her symptoms  -Other things we discussed that may help your symptoms:  Periodic elevation and regular physical activity  -After a 90 day trial of conservative therapy you will have an ultrasound of the leg to assess for venous insufficiency  -Followup with vascular surgeon after ultrasound

## 2018-06-19 NOTE — PROGRESS NOTES
Assessment/Plan:    49 y/o F with hx of left breast cancer s/p surgery with adjuvant RT and on Tamoxifen (2017), hx of sclerotherapy RLE (2011), seen for evaluation of varicose veins   -Symptomatic varicosities RLE  Experiencing pressure type pain overlying vein, worse when working out  -Discussed a trail of conservative management to include the daily use of 20-30 mmHg compression stockings, periodic elevation and regular physical activity  -She will have a venous study to assess for reflux and return to the office with a physician for review  -After any underlying reflux is address, which may be contributing to her pressure like pain, she is interested in pursuing sclerotherapy to spider veins     Diagnoses and all orders for this visit:    Varicose veins of right lower extremity with pain  -     Compression Stocking  -     VAS reflux lower limb venous duplex study with reflux assesment, unilateral; Future        Subjective:      Patient ID: Haley Huerta is a 50 y o  female  Patient is new to our practice  She was referred by Mihai CHI  She has pain in her R leg  She states that her veins are bulging and her leg becomes achy tired and heavy  She has worn compression socks for 3 days in 2011  Patient seen for evaluation of varicose veins  She reports a history of sclerotherapy to the right leg by our office in 2011  Currently on tamoxifen, history of left breast cancer status post surgical resection and adjuvant RT  Truncal varicosity to the right leg  She complains of pressure type pain overlying this vein  Pain is made worse while working out  Denies any edema  Spider veins in the right leg, which she would like to have treated after the pain to her right thigh is addressed  No history of DVT  She does not wear compression          The following portions of the patient's history were reviewed and updated as appropriate: allergies, current medications, past family history, past medical history, past social history, past surgical history and problem list     Review of Systems   Constitutional: Negative  HENT: Negative  Negative for drooling, ear pain, sinus pain and sinus pressure  Eyes: Positive for discharge  Negative for pain and itching  Respiratory: Negative for cough and chest tightness  Cardiovascular: Negative for chest pain  Gastrointestinal: Negative for diarrhea and nausea  Endocrine: Negative for cold intolerance and heat intolerance  Genitourinary: Negative  Negative for flank pain and urgency  Musculoskeletal: Negative for back pain and gait problem  Leg pain   Skin: Negative  Allergic/Immunologic: Positive for environmental allergies  Neurological: Negative for weakness, light-headedness and headaches  Hematological: Negative  Psychiatric/Behavioral: Negative  Objective:      LMP  (LMP Unknown)          Physical Exam   Constitutional: She is oriented to person, place, and time  She appears well-nourished  No distress  HENT:   Head: Normocephalic and atraumatic  Eyes: EOM are normal    Neck: Neck supple  No JVD present  Cardiovascular: Normal rate, regular rhythm and normal heart sounds  Pulses:       Dorsalis pedis pulses are 2+ on the left side  Posterior tibial pulses are 2+ on the right side, and 2+ on the left side  Truncal varicosity right medial thigh, spider veins   Pulmonary/Chest: Effort normal and breath sounds normal  No respiratory distress  Abdominal: Soft  She exhibits no distension  There is no tenderness  Musculoskeletal: Normal range of motion  She exhibits no edema  Neurological: She is alert and oriented to person, place, and time  Skin: Skin is warm and dry  Psychiatric: She has a normal mood and affect           Vitals:    06/19/18 0955   BP: 126/68   BP Location: Left arm   Patient Position: Sitting   Pulse: 72   Resp: 18   Temp: 99 3 °F (37 4 °C)   Weight: 53 1 kg (117 lb)   Height: 5' 6" (1 676 m)       Patient Active Problem List   Diagnosis    Malignant neoplasm of upper-outer quadrant of left breast in female, estrogen receptor positive ([address] )    Right lower quadrant pain    Rectal pain    External hemorrhoid    Narrowing of stools    Vaginal discharge    Encntr for gyn exam (general) (routine) w abnormal findings    Vaginal relaxation    Routine screening for STI (sexually transmitted infection)    Use of tamoxifen (Nolvadex)    Inconclusive mammogram due to dense breasts       Past Surgical History:   Procedure Laterality Date    APPENDECTOMY      BREAST BIOPSY Left     COLONOSCOPY      MS BIOPSY/EXCISION, LYMPH NODE(S) Left 1/3/2017    Procedure: BIOPSY LYMPH NODE SENTINEL @ [address] [facility] [address];  Surgeon: Joslyn Lin MD;  [facility];  Service: Surgical Oncology    MS MASTECTOMY, PARTIAL Left 1/3/2017    Procedure: LUMPECTOMY BREAST NEEDLE LOCALIZED @ 0930;  Surgeon: Joslyn Lin MD;  [facility];  Service: Surgical Oncology    SENTINEL LYMPH NODE BIOPSY         Family History   Problem Relation Age of Onset    Lung cancer Mother         mother age 61    Lung cancer Father         father age 66    Breast cancer Sister         age dx unk    Breast cancer Paternal Aunt         aunt 50    No Known Problems Brother        Social History     Social History    Marital status: /Civil Union     Spouse name: N/A    Number of children: 2    Years of education: N/A     Occupational History          Social History Main Topics    Smoking status: Former Smoker     Types: Cigarettes     Quit date: 2017    Smokeless tobacco: Never Used    Alcohol use Yes      Comment: occasional     Drug use: No    Sexual activity: Yes     Partners: Male     Other Topics Concern    Not on file     Social History Narrative    Exercises 5-6 times per week    Full time employment    Lives with spouse    No caffeine use    2 children ages 18 and 20       Allergies   Allergen Reactions    Other Other (See Comments)     Scallops-pt states she has swelling and vomiting  Also seasonal          Current Outpatient Prescriptions:     ALPRAZolam (XANAX) 0 25 mg tablet, Take 0 25 mg by mouth daily as needed for anxiety, Disp: , Rfl:     Calcium 500 MG tablet, Take 1 tablet by mouth daily, Disp: , Rfl:     COLLAGEN PO, Take 1 tablet by mouth daily, Disp: , Rfl:     fexofenadine (ALLEGRA) 180 MG tablet, Take 180 mg by mouth, Disp: , Rfl:     Ibuprofen 200 MG CAPS, Take by mouth as needed, Disp: , Rfl:     MILK THISTLE PO, Take 1 tablet by mouth daily, Disp: , Rfl:     Multiple Vitamins-Minerals (MULTIVITAMIN ADULT PO), Take 1 tablet by mouth daily, Disp: , Rfl:     tamoxifen (NOLVADEX) 20 mg tablet, Take 1 tablet by mouth daily, Disp: , Rfl:

## 2018-06-26 ENCOUNTER — APPOINTMENT (OUTPATIENT)
Dept: LAB | Facility: HOSPITAL | Age: 53
End: 2018-06-26
Attending: INTERNAL MEDICINE
Payer: COMMERCIAL

## 2018-06-26 DIAGNOSIS — Z17.0 MALIGNANT NEOPLASM OF UPPER-OUTER QUADRANT OF LEFT BREAST IN FEMALE, ESTROGEN RECEPTOR POSITIVE (HCC): ICD-10-CM

## 2018-06-26 DIAGNOSIS — C50.412 MALIGNANT NEOPLASM OF UPPER-OUTER QUADRANT OF LEFT BREAST IN FEMALE, ESTROGEN RECEPTOR POSITIVE (HCC): ICD-10-CM

## 2018-06-26 DIAGNOSIS — C50.412 MALIGNANT NEOPLASM OF UPPER-OUTER QUADRANT OF LEFT FEMALE BREAST (HCC): ICD-10-CM

## 2018-06-26 LAB
ALBUMIN SERPL BCP-MCNC: 3.8 G/DL (ref 3.5–5)
ALP SERPL-CCNC: 52 U/L (ref 46–116)
ALT SERPL W P-5'-P-CCNC: 31 U/L (ref 12–78)
ANION GAP SERPL CALCULATED.3IONS-SCNC: 6 MMOL/L (ref 4–13)
AST SERPL W P-5'-P-CCNC: 22 U/L (ref 5–45)
BASOPHILS # BLD AUTO: 0.02 THOUSANDS/ΜL (ref 0–0.1)
BASOPHILS NFR BLD AUTO: 0 % (ref 0–1)
BILIRUB SERPL-MCNC: 0.39 MG/DL (ref 0.2–1)
BUN SERPL-MCNC: 20 MG/DL (ref 5–25)
CALCIUM SERPL-MCNC: 9.1 MG/DL (ref 8.3–10.1)
CANCER AG27-29 SERPL-ACNC: 11.7 U/ML (ref 0–42.3)
CHLORIDE SERPL-SCNC: 102 MMOL/L (ref 100–108)
CO2 SERPL-SCNC: 29 MMOL/L (ref 21–32)
CREAT SERPL-MCNC: 0.59 MG/DL (ref 0.6–1.3)
EOSINOPHIL # BLD AUTO: 0.13 THOUSAND/ΜL (ref 0–0.61)
EOSINOPHIL NFR BLD AUTO: 2 % (ref 0–6)
ERYTHROCYTE [DISTWIDTH] IN BLOOD BY AUTOMATED COUNT: 13.3 % (ref 11.6–15.1)
GFR SERPL CREATININE-BSD FRML MDRD: 106 ML/MIN/1.73SQ M
GLUCOSE SERPL-MCNC: 75 MG/DL (ref 65–140)
HCT VFR BLD AUTO: 41.3 % (ref 34.8–46.1)
HGB BLD-MCNC: 13.1 G/DL (ref 11.5–15.4)
IMM GRANULOCYTES # BLD AUTO: 0.02 THOUSAND/UL (ref 0–0.2)
IMM GRANULOCYTES NFR BLD AUTO: 0 % (ref 0–2)
LYMPHOCYTES # BLD AUTO: 1.79 THOUSANDS/ΜL (ref 0.6–4.47)
LYMPHOCYTES NFR BLD AUTO: 26 % (ref 14–44)
MCH RBC QN AUTO: 30.2 PG (ref 26.8–34.3)
MCHC RBC AUTO-ENTMCNC: 31.7 G/DL (ref 31.4–37.4)
MCV RBC AUTO: 95 FL (ref 82–98)
MONOCYTES # BLD AUTO: 0.73 THOUSAND/ΜL (ref 0.17–1.22)
MONOCYTES NFR BLD AUTO: 11 % (ref 4–12)
NEUTROPHILS # BLD AUTO: 4.29 THOUSANDS/ΜL (ref 1.85–7.62)
NEUTS SEG NFR BLD AUTO: 61 % (ref 43–75)
NRBC BLD AUTO-RTO: 0 /100 WBCS
PLATELET # BLD AUTO: 214 THOUSANDS/UL (ref 149–390)
PMV BLD AUTO: 11.5 FL (ref 8.9–12.7)
POTASSIUM SERPL-SCNC: 4.2 MMOL/L (ref 3.5–5.3)
PROT SERPL-MCNC: 7.5 G/DL (ref 6.4–8.2)
RBC # BLD AUTO: 4.34 MILLION/UL (ref 3.81–5.12)
SODIUM SERPL-SCNC: 137 MMOL/L (ref 136–145)
WBC # BLD AUTO: 6.98 THOUSAND/UL (ref 4.31–10.16)

## 2018-06-26 PROCEDURE — 80053 COMPREHEN METABOLIC PANEL: CPT

## 2018-06-26 PROCEDURE — 86300 IMMUNOASSAY TUMOR CA 15-3: CPT

## 2018-06-26 PROCEDURE — 85025 COMPLETE CBC W/AUTO DIFF WBC: CPT

## 2018-06-26 PROCEDURE — 36415 COLL VENOUS BLD VENIPUNCTURE: CPT

## 2018-07-02 ENCOUNTER — OFFICE VISIT (OUTPATIENT)
Dept: HEMATOLOGY ONCOLOGY | Facility: CLINIC | Age: 53
End: 2018-07-02
Payer: COMMERCIAL

## 2018-07-02 VITALS
WEIGHT: 118.8 LBS | DIASTOLIC BLOOD PRESSURE: 76 MMHG | HEART RATE: 78 BPM | SYSTOLIC BLOOD PRESSURE: 124 MMHG | OXYGEN SATURATION: 99 % | BODY MASS INDEX: 19.09 KG/M2 | RESPIRATION RATE: 16 BRPM | HEIGHT: 66 IN | TEMPERATURE: 97.4 F

## 2018-07-02 DIAGNOSIS — Z17.0 MALIGNANT NEOPLASM OF UPPER-OUTER QUADRANT OF LEFT BREAST IN FEMALE, ESTROGEN RECEPTOR POSITIVE (HCC): Primary | ICD-10-CM

## 2018-07-02 DIAGNOSIS — C50.412 MALIGNANT NEOPLASM OF UPPER-OUTER QUADRANT OF LEFT BREAST IN FEMALE, ESTROGEN RECEPTOR POSITIVE (HCC): Primary | ICD-10-CM

## 2018-07-02 DIAGNOSIS — M53.3 COCCYX PAIN: ICD-10-CM

## 2018-07-02 PROCEDURE — 99214 OFFICE O/P EST MOD 30 MIN: CPT | Performed by: INTERNAL MEDICINE

## 2018-07-02 NOTE — LETTER
July 2, 2018     Seda Hansen,   [address]  [address] [address]    Patient: Jef Evans   YOB: 1965   Date of Visit: 7/2/2018       Dear Dr Dilshad Zhang: Thank you for referring Ronnie Joy to me for evaluation  Below are my notes for this consultation  If you have questions, please do not hesitate to call me  I look forward to following your patient along with you  Sincerely,        Danielle Shaikh MD        CC: No Recipients  Danielle Shaikh MD  7/2/2018 12:55 PM  Sign at close encounter    HPI:   Follow-up visit for for hormone receptor positive, HER-2 negative, G1, T1c, 1 4 cm, stage I invasive mammary carcinoma in the left breast   Patient had lumpectomy and sentinel lymph node sampling in January 2017    Oncotype score was 10  BRCA test was negative  Since February 2017 she has been on tamoxifen  She received radiation  She has noticed some thinning of the hair on tamoxifen  Hot flashes  Mood swings  Anxiety Patient is premenopausal  She could be going through the change of life because menstrual periods are becoming less frequent like once every 3 months  We discussed making her postmenopausal now either by ovarian suppression or BSO  She would prefer to go into postmenopausal status naturally especially when her periods are getting less frequent  is up-to-date with her GYN examination  She is taking vitamin D and calcium and baby aspirin 3 days a week  She has some discomfort in the coccyx  No history of trauma  This discomfort has been going on for quite some time like several months           Current Outpatient Prescriptions:     ALPRAZolam (XANAX) 0 25 mg tablet, Take 0 25 mg by mouth daily as needed for anxiety, Disp: , Rfl:     Calcium 500 MG tablet, Take 1 tablet by mouth daily, Disp: , Rfl:     COLLAGEN PO, Take 1 tablet by mouth daily, Disp: , Rfl:     fexofenadine (ALLEGRA) 180 MG tablet, Take 180 mg by mouth, Disp: , Rfl:     Ibuprofen 200 MG CAPS, Take by mouth as needed, Disp: , Rfl:     MILK THISTLE PO, Take 1 tablet by mouth daily, Disp: , Rfl:     Multiple Vitamins-Minerals (MULTIVITAMIN ADULT PO), Take 1 tablet by mouth daily, Disp: , Rfl:     tamoxifen (NOLVADEX) 20 mg tablet, Take 1 tablet by mouth daily, Disp: , Rfl:     Allergies   Allergen Reactions    Other Other (See Comments)     Scallops-pt states she has swelling and vomiting  Also seasonal           Malignant neoplasm of upper-outer quadrant of left breast in female, estrogen receptor positive ([facility] [address] )    11/2016 Initial Diagnosis     Malignant neoplasm of upper-outer quadrant of left breast in female, estrogen receptor positive ([facility] [address] )         11/17/2016 Biopsy     Left breast biopsy: invasive and in situ ductal carcinoma          1/3/2017 Surgery     Left lumpectomy and sentinel node biopsy  Final staging: Stage IA IDC, strongly ER/NE +, HER2-         2017 Genetic Testing     Oncotype score was 10  BRCA test was negative  2/2017 -  Hormone Therapy     Tamoxifen          3/13/2017 - 4/24/2017 Radiation       Plan ID Energy Fractions Dose per Fraction (cGy) Total Dose Delivered (cGy) Elapsed Days   FB L BOOST 9E 5 / 5 200 1,000 6   FB L BREAST 6X 25 / 25 200 5,000 35      Treatment dates:  3/13/2017 - 4/24/2017               ROS:  07/02/18 Reviewed 13 systems:  Presently no headaches, seizures, dizziness, diplopia, dysphagia, hoarseness, chest pain, palpitations, shortness of breath, cough, hemoptysis, abdominal pain, nausea, vomiting, change in bowel habits, melena, hematuria, fever, chills, bleeding,  skin rash, weight loss, arthritic symptoms, tiredness ,  weakness, numbness,  claudication and gait problem  No frequent infections  Not unusually sensitive to heat or cold  No swelling of the ankles  No swollen glands  Patient is anxious   Other symptoms are in HPI        /76 (BP Location: Left arm, Cuff Size: Adult)   Pulse 78   Temp (!) 97 4 °F (36 3 °C) (Tympanic) Resp 16   Ht 5' 6" (1 676 m)   Wt 53 9 kg (118 lb 12 8 oz)   SpO2 99%   BMI 19 17 kg/m²      Physical Exam:  Alert, oriented, not in distress, no icterus, no oral thrush, no palpable neck mass, clear lung fields, regular heart rate, abdomen  soft and non tender, no palpable abdominal mass, no ascites, no edema of ankles, no calf tenderness, no focal neurological deficit, no skin rash, no palpable lymphadenopathy, good arterial pulses, no clubbing  No lymphedema Patient is anxious  Performance status 1  Medical student Ricardo Garcia was with me in the room during examination    IMAGING:      LABS:  Results for orders placed or performed in visit on 06/26/18   CBC and differential   Result Value Ref Range    WBC 6 98 4 31 - 10 16 Thousand/uL    RBC 4 34 3 81 - 5 12 Million/uL    Hemoglobin 13 1 11 5 - 15 4 g/dL    Hematocrit 41 3 34 8 - 46 1 %    MCV 95 82 - 98 fL    MCH 30 2 26 8 - 34 3 pg    MCHC 31 7 31 4 - 37 4 g/dL    RDW 13 3 11 6 - 15 1 %    MPV 11 5 8 9 - 12 7 fL    Platelets 904 135 - 348 Thousands/uL    nRBC 0 /100 WBCs    Neutrophils Relative 61 43 - 75 %    Immat GRANS % 0 0 - 2 %    Lymphocytes Relative 26 14 - 44 %    Monocytes Relative 11 4 - 12 %    Eosinophils Relative 2 0 - 6 %    Basophils Relative 0 0 - 1 %    Neutrophils Absolute 4 29 1 85 - 7 62 Thousands/µL    Immature Grans Absolute 0 02 0 00 - 0 20 Thousand/uL    Lymphocytes Absolute 1 79 0 60 - 4 47 Thousands/µL    Monocytes Absolute 0 73 0 17 - 1 22 Thousand/µL    Eosinophils Absolute 0 13 0 00 - 0 61 Thousand/µL    Basophils Absolute 0 02 0 00 - 0 10 Thousands/µL   Cancer antigen 27 29   Result Value Ref Range    CA 27 29 11 7 0 0 - 42 3 U/mL   Comprehensive metabolic panel   Result Value Ref Range    Sodium 137 136 - 145 mmol/L    Potassium 4 2 3 5 - 5 3 mmol/L    Chloride 102 100 - 108 mmol/L    CO2 29 21 - 32 mmol/L    Anion Gap 6 4 - 13 mmol/L    BUN 20 5 - 25 mg/dL    Creatinine 0 59 (L) 0 60 - 1 30 mg/dL    Glucose 75 65 - 140 mg/dL Calcium 9 1 8 3 - 10 1 mg/dL    AST 22 5 - 45 U/L    ALT 31 12 - 78 U/L    Alkaline Phosphatase 52 46 - 116 U/L    Total Protein 7 5 6 4 - 8 2 g/dL    Albumin 3 8 3 5 - 5 0 g/dL    Total Bilirubin 0 39 0 20 - 1 00 mg/dL    eGFR 106 ml/min/1 73sq m     Labs, Imaging, & Other studies:   All pertinent labs and imaging studies were personally reviewed    Lab Results   Component Value Date     06/26/2018    K 4 2 06/26/2018     06/26/2018    CO2 29 06/26/2018    ANIONGAP 6 06/26/2018    BUN 20 06/26/2018    CREATININE 0 59 (L) 06/26/2018    GLUCOSE 75 06/26/2018    CALCIUM 9 1 06/26/2018    AST 22 06/26/2018    ALT 31 06/26/2018    ALKPHOS 52 06/26/2018    PROT 7 5 06/26/2018    BILITOT 0 39 06/26/2018    EGFR 106 06/26/2018     Lab Results   Component Value Date    WBC 6 98 06/26/2018    HGB 13 1 06/26/2018    HCT 41 3 06/26/2018    MCV 95 06/26/2018     06/26/2018   No results found for: SEDRATE    Reviewed and discussed with patient  Assessment and plan: Follow-up visit for for hormone receptor positive, HER-2 negative, G1, T1c, 1 4 cm, stage I invasive mammary carcinoma in the left breast   Patient had lumpectomy and sentinel lymph node sampling in January 2017    Oncotype score was 10  BRCA test was negative  Since February 2017 she has been on tamoxifen  She received radiation  She has noticed some thinning of the hair on tamoxifen  Hot flashes  Mood swings  Anxiety Patient is premenopausal  She could be going through the change of life because menstrual periods are becoming less frequent like once every 3 months  We discussed making her postmenopausal now either by ovarian suppression or BSO  She would prefer to go into postmenopausal status naturally especially when her periods are getting less frequent  is up-to-date with her GYN examination  She is taking vitamin D and calcium and baby aspirin 3 days a week  She has some discomfort in the coccyx  No history of trauma    This discomfort has been going on for quite some time like several months     Physical examination and test results are as recorded and discussed  She remains in remission from breast cancer  She will continue to take tamoxifen  She will have x-ray of her coccyx area  We discussed self breast examination, eating healthy foods and exercises as tolerated  Also health screening tests  She goes to her breast surgeon for examination of breasts and imaging studies  She voiced understanding and agreement with above discussion  She is aware to contact us with questions or symptoms in the interim  We'll see her in follow-up in 3 months    1  Malignant neoplasm of upper-outer quadrant of left breast in female, estrogen receptor positive (HCC)      - Cancer antigen 27 29; Future  - CBC and differential; Future  - Comprehensive metabolic panel; Future    2  Coccyx pain      - XR sacrum and coccyx; Future         Patient voiced understanding and agreement in the discussion  Counseling / Coordination of Care   Greater than 50% of total time was spent with the patient and / or family counseling and / or coordination of care

## 2018-07-02 NOTE — PROGRESS NOTES
HPI:   Follow-up visit for for hormone receptor positive, HER-2 negative, G1, T1c, 1 4 cm, stage I invasive mammary carcinoma in the left breast   Patient had lumpectomy and sentinel lymph node sampling in January 2017    Oncotype score was 10  BRCA test was negative  Since February 2017 she has been on tamoxifen  She received radiation  She has noticed some thinning of the hair on tamoxifen  Hot flashes  Mood swings  Anxiety Patient is premenopausal  She could be going through the change of life because menstrual periods are becoming less frequent like once every 3 months  We discussed making her postmenopausal now either by ovarian suppression or BSO  She would prefer to go into postmenopausal status naturally especially when her periods are getting less frequent  is up-to-date with her GYN examination  She is taking vitamin D and calcium and baby aspirin 3 days a week  She has some discomfort in the coccyx  No history of trauma  This discomfort has been going on for quite some time like several months           Current Outpatient Prescriptions:     ALPRAZolam (XANAX) 0 25 mg tablet, Take 0 25 mg by mouth daily as needed for anxiety, Disp: , Rfl:     Calcium 500 MG tablet, Take 1 tablet by mouth daily, Disp: , Rfl:     COLLAGEN PO, Take 1 tablet by mouth daily, Disp: , Rfl:     fexofenadine (ALLEGRA) 180 MG tablet, Take 180 mg by mouth, Disp: , Rfl:     Ibuprofen 200 MG CAPS, Take by mouth as needed, Disp: , Rfl:     MILK THISTLE PO, Take 1 tablet by mouth daily, Disp: , Rfl:     Multiple Vitamins-Minerals (MULTIVITAMIN ADULT PO), Take 1 tablet by mouth daily, Disp: , Rfl:     tamoxifen (NOLVADEX) 20 mg tablet, Take 1 tablet by mouth daily, Disp: , Rfl:     Allergies   Allergen Reactions    Other Other (See Comments)     Scallops-pt states she has swelling and vomiting  Also seasonal           Malignant neoplasm of upper-outer quadrant of left breast in female, estrogen receptor positive ([facility] [address] ) 11/2016 Initial Diagnosis     Malignant neoplasm of upper-outer quadrant of left breast in female, estrogen receptor positive ([facility] [address] )         11/17/2016 Biopsy     Left breast biopsy: invasive and in situ ductal carcinoma          1/3/2017 Surgery     Left lumpectomy and sentinel node biopsy  Final staging: Stage IA IDC, strongly ER/WY +, HER2-         2017 Genetic Testing     Oncotype score was 10  BRCA test was negative  2/2017 -  Hormone Therapy     Tamoxifen          3/13/2017 - 4/24/2017 Radiation       Plan ID Energy Fractions Dose per Fraction (cGy) Total Dose Delivered (cGy) Elapsed Days   FB L BOOST 9E 5 / 5 200 1,000 6   FB L BREAST 6X 25 / 25 200 5,000 35      Treatment dates:  3/13/2017 - 4/24/2017               ROS:  07/02/18 Reviewed 13 systems:  Presently no headaches, seizures, dizziness, diplopia, dysphagia, hoarseness, chest pain, palpitations, shortness of breath, cough, hemoptysis, abdominal pain, nausea, vomiting, change in bowel habits, melena, hematuria, fever, chills, bleeding,  skin rash, weight loss, arthritic symptoms, tiredness ,  weakness, numbness,  claudication and gait problem  No frequent infections  Not unusually sensitive to heat or cold  No swelling of the ankles  No swollen glands  Patient is anxious  Other symptoms are in HPI        /76 (BP Location: Left arm, Cuff Size: Adult)   Pulse 78   Temp (!) 97 4 °F (36 3 °C) (Tympanic)   Resp 16   Ht 5' 6" (1 676 m)   Wt 53 9 kg (118 lb 12 8 oz)   SpO2 99%   BMI 19 17 kg/m²     Physical Exam:  Alert, oriented, not in distress, no icterus, no oral thrush, no palpable neck mass, clear lung fields, regular heart rate, abdomen  soft and non tender, no palpable abdominal mass, no ascites, no edema of ankles, no calf tenderness, no focal neurological deficit, no skin rash, no palpable lymphadenopathy, good arterial pulses, no clubbing  No lymphedema Patient is anxious  Performance status 1    Medical student Nicolas Larkin was with me in the room during examination    IMAGING:      LABS:  Results for orders placed or performed in visit on 06/26/18   CBC and differential   Result Value Ref Range    WBC 6 98 4 31 - 10 16 Thousand/uL    RBC 4 34 3 81 - 5 12 Million/uL    Hemoglobin 13 1 11 5 - 15 4 g/dL    Hematocrit 41 3 34 8 - 46 1 %    MCV 95 82 - 98 fL    MCH 30 2 26 8 - 34 3 pg    MCHC 31 7 31 4 - 37 4 g/dL    RDW 13 3 11 6 - 15 1 %    MPV 11 5 8 9 - 12 7 fL    Platelets 271 071 - 627 Thousands/uL    nRBC 0 /100 WBCs    Neutrophils Relative 61 43 - 75 %    Immat GRANS % 0 0 - 2 %    Lymphocytes Relative 26 14 - 44 %    Monocytes Relative 11 4 - 12 %    Eosinophils Relative 2 0 - 6 %    Basophils Relative 0 0 - 1 %    Neutrophils Absolute 4 29 1 85 - 7 62 Thousands/µL    Immature Grans Absolute 0 02 0 00 - 0 20 Thousand/uL    Lymphocytes Absolute 1 79 0 60 - 4 47 Thousands/µL    Monocytes Absolute 0 73 0 17 - 1 22 Thousand/µL    Eosinophils Absolute 0 13 0 00 - 0 61 Thousand/µL    Basophils Absolute 0 02 0 00 - 0 10 Thousands/µL   Cancer antigen 27 29   Result Value Ref Range    CA 27 29 11 7 0 0 - 42 3 U/mL   Comprehensive metabolic panel   Result Value Ref Range    Sodium 137 136 - 145 mmol/L    Potassium 4 2 3 5 - 5 3 mmol/L    Chloride 102 100 - 108 mmol/L    CO2 29 21 - 32 mmol/L    Anion Gap 6 4 - 13 mmol/L    BUN 20 5 - 25 mg/dL    Creatinine 0 59 (L) 0 60 - 1 30 mg/dL    Glucose 75 65 - 140 mg/dL    Calcium 9 1 8 3 - 10 1 mg/dL    AST 22 5 - 45 U/L    ALT 31 12 - 78 U/L    Alkaline Phosphatase 52 46 - 116 U/L    Total Protein 7 5 6 4 - 8 2 g/dL    Albumin 3 8 3 5 - 5 0 g/dL    Total Bilirubin 0 39 0 20 - 1 00 mg/dL    eGFR 106 ml/min/1 73sq m     Labs, Imaging, & Other studies:   All pertinent labs and imaging studies were personally reviewed    Lab Results   Component Value Date     06/26/2018    K 4 2 06/26/2018     06/26/2018    CO2 29 06/26/2018    ANIONGAP 6 06/26/2018    BUN 20 06/26/2018    CREATININE 0 59 (L) 06/26/2018    GLUCOSE 75 06/26/2018    CALCIUM 9 1 06/26/2018    AST 22 06/26/2018    ALT 31 06/26/2018    ALKPHOS 52 06/26/2018    PROT 7 5 06/26/2018    BILITOT 0 39 06/26/2018    EGFR 106 06/26/2018     Lab Results   Component Value Date    WBC 6 98 06/26/2018    HGB 13 1 06/26/2018    HCT 41 3 06/26/2018    MCV 95 06/26/2018     06/26/2018   No results found for: [address] [place], [place] [address] [place] and discussed with patient  Assessment and plan: Follow-up visit for for hormone receptor positive, HER-2 negative, G1, T1c, 1 4 cm, stage I invasive mammary carcinoma in the left breast   Patient had lumpectomy and sentinel lymph node sampling in January 2017    Oncotype score was 10  BRCA test was negative  Since February 2017 she has been on tamoxifen  She received radiation  She has noticed some thinning of the hair on tamoxifen  Hot flashes  Mood swings  Anxiety Patient is premenopausal  She could be going through the change of life because menstrual periods are becoming less frequent like once every 3 months  We discussed making her postmenopausal now either by ovarian suppression or BSO  She would prefer to go into postmenopausal status naturally especially when her periods are getting less frequent  is up-to-date with her GYN examination  She is taking vitamin D and calcium and baby aspirin 3 days a week  She has some discomfort in the coccyx  No history of trauma  This discomfort has been going on for quite some time like several months     Physical examination and test results are as recorded and discussed  She remains in remission from breast cancer  She will continue to take tamoxifen  She will have x-ray of her coccyx area  We discussed self breast examination, eating healthy foods and exercises as tolerated  Also health screening tests  She goes to her breast surgeon for examination of breasts and imaging studies  She voiced understanding and agreement with above discussion   She is aware to contact us with questions or symptoms in the interim  We'll see her in follow-up in 3 months    1  Malignant neoplasm of upper-outer quadrant of left breast in female, estrogen receptor positive (HCC)      - Cancer antigen 27 29; Future  - CBC and differential; Future  - Comprehensive metabolic panel; Future    2  Coccyx pain      - XR sacrum and coccyx; Future         Patient voiced understanding and agreement in the discussion  Counseling / Coordination of Care   Greater than 50% of total time was spent with the patient and / or family counseling and / or coordination of care

## 2018-07-23 DIAGNOSIS — C50.412 MALIGNANT NEOPLASM OF UPPER-OUTER QUADRANT OF LEFT FEMALE BREAST (HCC): ICD-10-CM

## 2018-08-13 ENCOUNTER — HOSPITAL ENCOUNTER (OUTPATIENT)
Dept: MAMMOGRAPHY | Facility: CLINIC | Age: 53
Discharge: HOME | End: 2018-08-13
Payer: COMMERCIAL

## 2018-08-13 DIAGNOSIS — C50.412 MALIGNANT NEOPLASM OF UPPER-OUTER QUADRANT OF LEFT BREAST IN FEMALE, ESTROGEN RECEPTOR POSITIVE (HCC): ICD-10-CM

## 2018-08-13 DIAGNOSIS — Z17.0 MALIGNANT NEOPLASM OF UPPER-OUTER QUADRANT OF LEFT BREAST IN FEMALE, ESTROGEN RECEPTOR POSITIVE (HCC): ICD-10-CM

## 2018-08-13 PROCEDURE — 77066 DX MAMMO INCL CAD BI: CPT

## 2018-08-13 PROCEDURE — G0279 TOMOSYNTHESIS, MAMMO: HCPCS

## 2018-09-17 ENCOUNTER — HOSPITAL ENCOUNTER (OUTPATIENT)
Dept: NON INVASIVE DIAGNOSTICS | Facility: CLINIC | Age: 53
Discharge: HOME | End: 2018-09-17
Payer: COMMERCIAL

## 2018-09-17 DIAGNOSIS — I83.811 VARICOSE VEINS OF RIGHT LOWER EXTREMITY WITH PAIN: ICD-10-CM

## 2018-09-17 PROCEDURE — 93971 EXTREMITY STUDY: CPT

## 2018-09-17 PROCEDURE — 93971 EXTREMITY STUDY: CPT | Performed by: SURGERY

## 2018-09-24 ENCOUNTER — DOCUMENTATION (OUTPATIENT)
Dept: ADMINISTRATIVE | Facility: HOSPITAL | Age: 53
End: 2018-09-24

## 2018-09-24 ENCOUNTER — OFFICE VISIT (OUTPATIENT)
Dept: VASCULAR SURGERY | Facility: CLINIC | Age: 53
End: 2018-09-24
Payer: COMMERCIAL

## 2018-09-24 VITALS
DIASTOLIC BLOOD PRESSURE: 60 MMHG | TEMPERATURE: 98.6 F | HEIGHT: 66 IN | BODY MASS INDEX: 19.13 KG/M2 | WEIGHT: 119 LBS | HEART RATE: 72 BPM | SYSTOLIC BLOOD PRESSURE: 106 MMHG

## 2018-09-24 DIAGNOSIS — I83.811 VARICOSE VEINS OF RIGHT LOWER EXTREMITY WITH PAIN: Primary | ICD-10-CM

## 2018-09-24 PROCEDURE — 99213 OFFICE O/P EST LOW 20 MIN: CPT | Performed by: SURGERY

## 2018-09-24 NOTE — PROGRESS NOTES
Per Dr Lori Lubin, patient was quoted $250 for 2 vials of sclero  Please see payment agreement loaded in media  Patient will call office to set up injections  Per Dr Lori Lubin, hour and a half appt needed

## 2018-09-24 NOTE — PATIENT INSTRUCTIONS
1) Venous disease  -you had treatment of your greater saphenous vein in the past  -your ultrasound showed no other leaky valves (venous insufficiency) in the veins  -please continue your conservative management for comfort and prevention of disease progression; this includes compression garment use, leg elevation, exercise, maintaining a healthy weight  -if you would like to get cosmetic sclerotherapy injections, this would cost $250 for 2 vials of solution

## 2018-09-24 NOTE — PROGRESS NOTES
Assessment/Plan:    Pt is a 47 yo F w/ hx of L breast cancer s/p resection and radiation; currently maintained on tamoxifen, presents to discuss venous disease  Varicose veins of right lower extremity with pain  -hx of RLE venous procedure, in office '11  -reviewed reflux study which shows competent deep and superficial systems; GSV is absent; there is an anterior saphenous branch which is competent  -discussed pathophysiology of venous disease and options for treatment; as she has no incompetent veins and very mild symptoms, there is no role for surgical management  -have offered her sclerotherapy for cosmesis; 2 vials, R medial knee, $250; discussed risks including darkening/lightening of the skin; the larger varicose area could become firm/dark; ulceration  -f/u PRN    Subjective:      Patient ID: Hannah Little is a 48 y o  female  Patient is here today to discuss results of LEVDR done on 9/21/18  Patient complains of bulging and pressure in the right leg  Denies any aching, tiredness and heaviness  Patient is wearing compression stockings  HPI:    Patient presents to discuss venous disease  Patient has a hx of a procedure in '11  She thinks this was done in an office on [facility] but doesn't know who did it, what was done, or hospital affiliation  She thinks they "injected a dye"  Patient complains of aching in the varicose vein in her R thigh, mostly while working out  She has been wearing compression sometimes but not when she works out  She elevates her legs sometimes at work  She exercises frequently and maintains a healthy weight  She denies swelling and symptoms are mild when not working out  She doesn't like the way the veins look          The following portions of the patient's history were reviewed and updated as appropriate: allergies, current medications, past family history, past medical history, past social history, past surgical history and problem list     Review of Systems   Constitutional: Negative  HENT: Negative  Eyes: Negative  Respiratory: Negative  Cardiovascular: Negative  Negative for leg swelling  Gastrointestinal: Negative  Endocrine: Negative  Genitourinary: Negative  Musculoskeletal: Negative  Skin: Negative for wound  Varicose vein RLE   Allergic/Immunologic: Negative  Neurological: Negative  Hematological: Negative  Psychiatric/Behavioral: Negative  Objective:      /60 (BP Location: Right arm, Patient Position: Sitting)   Pulse 72   Temp 98 6 °F (37 °C) (Tympanic)   Ht 5' 6" (1 676 m)   Wt 54 kg (119 lb)   BMI 19 21 kg/m²          Physical Exam   Constitutional: She is oriented to person, place, and time  She appears well-developed and well-nourished  HENT:   Head: Normocephalic and atraumatic  Eyes: Conjunctivae are normal    Neck: Normal range of motion  Neck supple  Cardiovascular: Normal rate, regular rhythm and normal heart sounds  No murmur heard  Pulmonary/Chest: Effort normal and breath sounds normal  No respiratory distress  She has no wheezes  Abdominal: Soft  She exhibits no distension  There is no tenderness  There is no rebound  Musculoskeletal: Normal range of motion  She exhibits no edema  Neurological: She is alert and oriented to person, place, and time  Skin: Skin is warm and dry  Cluster of spider veins over R medial knee  There is a prominent small knob of varicose vein in this area as well; there is a prominent anterior saphenous branch which is visible but not varicosed  Psychiatric: She has a normal mood and affect  Her behavior is normal    Nursing note and vitals reviewed          Vitals:    09/24/18 1016   BP: 106/60   BP Location: Right arm   Patient Position: Sitting   Pulse: 72   Temp: 98 6 °F (37 °C)   TempSrc: Tympanic   Weight: 54 kg (119 lb)   Height: 5' 6" (1 676 m)       Patient Active Problem List   Diagnosis    Malignant neoplasm of upper-outer quadrant of left breast in female, estrogen receptor positive ([address] )    Right lower quadrant pain    Rectal pain    External hemorrhoid    Narrowing of stools    Vaginal discharge    Encntr for gyn exam (general) (routine) w abnormal findings    Vaginal relaxation    Routine screening for STI (sexually transmitted infection)    Use of tamoxifen (Nolvadex)    Inconclusive mammogram due to dense breasts    Varicose veins of right lower extremity with pain    Coccyx pain       Past Surgical History:   Procedure Laterality Date    APPENDECTOMY      BREAST BIOPSY Left     COLONOSCOPY      NE BIOPSY/EXCISION, LYMPH NODE(S) Left 1/3/2017    Procedure: BIOPSY LYMPH NODE SENTINEL @ [address];  Surgeon: Carlos Mathias MD;  Location: [facility];  Service: Surgical Oncology    NE MASTECTOMY, PARTIAL Left 1/3/2017    Procedure: LUMPECTOMY BREAST NEEDLE LOCALIZED @ 0930;  Surgeon: Carlos Mathias MD;  Location: [facility];  Service: Surgical Oncology    SENTINEL LYMPH NODE BIOPSY         Family History   Problem Relation Age of Onset    Lung cancer Mother         mother age 61    Lung cancer Father         father age 66    Breast cancer Sister         age dx unk    Breast cancer Paternal Aunt         aunt 50    No Known Problems Brother        Social History     Social History    Marital status: /Civil Union     Spouse name: N/A    Number of children: 2    Years of education: N/A     Occupational History          Social History Main Topics    Smoking status: Former Smoker     Types: Cigarettes     Quit date: 2017    Smokeless tobacco: Never Used    Alcohol use Yes      Comment: occasional     Drug use: No    Sexual activity: Yes     Partners: Male     Other Topics Concern    Not on file     Social History Narrative    Exercises 5-6 times per week    Full time employment    Lives with spouse    No caffeine use    2 children ages 25 and 20       Allergies   Allergen Reactions    Other Other (See Comments)     Scallops-pt states she has swelling and vomiting  Also seasonal          Current Outpatient Prescriptions:     ALPRAZolam (XANAX) 0 25 mg tablet, Take 0 25 mg by mouth daily as needed for anxiety, Disp: , Rfl:     Calcium 500 MG tablet, Take 1 tablet by mouth daily, Disp: , Rfl:     COLLAGEN PO, Take 1 tablet by mouth daily, Disp: , Rfl:     fexofenadine (ALLEGRA) 180 MG tablet, Take 180 mg by mouth, Disp: , Rfl:     Ibuprofen 200 MG CAPS, Take by mouth as needed, Disp: , Rfl:     MILK THISTLE PO, Take 1 tablet by mouth daily, Disp: , Rfl:     Multiple Vitamins-Minerals (MULTIVITAMIN ADULT PO), Take 1 tablet by mouth daily, Disp: , Rfl:     tamoxifen (NOLVADEX) 20 mg tablet, Take 1 tablet by mouth daily, Disp: , Rfl:

## 2018-10-25 ENCOUNTER — OFFICE VISIT (OUTPATIENT)
Dept: PLASTIC SURGERY | Facility: MEDICAL CENTER | Age: 53
End: 2018-10-25
Payer: COMMERCIAL

## 2018-10-25 VITALS — BODY MASS INDEX: 18.83 KG/M2 | WEIGHT: 120 LBS | HEIGHT: 67 IN

## 2018-10-25 DIAGNOSIS — Z17.0 MALIGNANT NEOPLASM OF UPPER-OUTER QUADRANT OF LEFT BREAST IN FEMALE, ESTROGEN RECEPTOR POSITIVE (HCC): Primary | ICD-10-CM

## 2018-10-25 DIAGNOSIS — C50.412 MALIGNANT NEOPLASM OF UPPER-OUTER QUADRANT OF LEFT BREAST IN FEMALE, ESTROGEN RECEPTOR POSITIVE (HCC): Primary | ICD-10-CM

## 2018-10-25 PROCEDURE — 99214 OFFICE O/P EST MOD 30 MIN: CPT | Performed by: SURGERY

## 2018-10-25 NOTE — PROGRESS NOTES
Assessment/Plan:    Malignant neoplasm of upper-outer quadrant of left breast in female, estrogen receptor positive [facility]  79-year-old female who is status post left breast lumpectomy with whole breast radiation that ended in April of 2017  She presents with complaint of contour deformity to her left upper outer quadrant  She would like further evaluation for left breast fat grafting  She would benefit from structural fat grafting to the left breast   I also recommended additional fat grafting to the right lateral breast for symmetry  Discussed with her risks, benefits and alternatives of the procedure including not limited to risk of bleeding, infection, scar and 60% fat take  She understands these risks and wishes to proceed  I personally obtained her informed consent  We discussed harvest sites from the abdominal region and flanks  She has minimal fat but it should be enough for the contour deformity  I discussed the possibility that there may be some skin laxity given that she has abdominal striae from her pregnancies  I recommended a mini abdominoplasty for the skin laxity  She has a minimal rectus diastasis and should not require abdominal wall plication  She requested a quote  We will provide that for her  I spent 30 min with the patient greater than half that time involving counseling  Diagnoses and all orders for this visit:    Malignant neoplasm of upper-outer quadrant of left breast in female, estrogen [facility]          Subjective:      Patient ID: Ant Chi is a 48 y o  female  79-year-old female who is status post left breast lumpectomy with whole breast radiation that ended in April of 2017  She presents with complaint of contour deformity to her left upper outer quadrant  She would like further evaluation for left breast fat grafting          The following portions of the patient's history were reviewed and updated as appropriate: allergies, current medications, past family history, past medical history, past social history, past surgical history and problem list     Review of Systems   Constitutional: Negative  HENT: Negative  Eyes: Negative  Respiratory: Negative  Cardiovascular: Negative  Gastrointestinal: Negative  Endocrine: Negative  Genitourinary: Negative  Musculoskeletal: Negative  Skin: Negative  Allergic/Immunologic: Negative  Neurological: Negative  Hematological: Negative  Psychiatric/Behavioral: Negative            Objective:      Ht 5' 7" (1 702 m)   Wt 54 4 kg (120 lb)   BMI 18 79 kg/m²          Physical Exam   Pulmonary/Chest:

## 2018-10-25 NOTE — ASSESSMENT & PLAN NOTE
51-year-old female who is status post left breast lumpectomy with whole breast radiation that ended in April of 2017  She presents with complaint of contour deformity to her left upper outer quadrant  She would like further evaluation for left breast fat grafting  She would benefit from structural fat grafting to the left breast   I also recommended additional fat grafting to the right lateral breast for symmetry  Discussed with her risks, benefits and alternatives of the procedure including not limited to risk of bleeding, infection, scar and 60% fat take  She understands these risks and wishes to proceed  I personally obtained her informed consent  We discussed harvest sites from the abdominal region and flanks  She has minimal fat but it should be enough for the contour deformity  I discussed the possibility that there may be some skin laxity given that she has abdominal striae from her pregnancies  I recommended a mini abdominoplasty for the skin laxity  She has a minimal rectus diastasis and should not require abdominal wall plication  She requested a quote  We will provide that for her

## 2018-10-25 NOTE — LETTER
October 25, 2018     Jason Trujillo MD  [address] [facility]  [address] [facility]    Patient: Eddy Councilman   YOB: 1965   Date of Visit: 10/25/2018       Dear Dr Alexander: Thank you for referring Americo Fong to me for evaluation  Below are my notes for this consultation  If you have questions, please do not hesitate to call me  I look forward to following your patient along with you  Sincerely,        Mallorie Payan MD        CC: No Recipients  Mallorie Payan MD  10/25/2018 10:21 AM  Sign at close encounter  Assessment/Plan:    Malignant neoplasm of upper-outer quadrant of left breast in female, estrogen receptor positive [facility]  28-year-old female who is status post left breast lumpectomy with whole breast radiation that ended in April of 2017  She presents with complaint of contour deformity to her left upper outer quadrant  She would like further evaluation for left breast fat grafting  She would benefit from structural fat grafting to the left breast   I also recommended additional fat grafting to the right lateral breast for symmetry  Discussed with her risks, benefits and alternatives of the procedure including not limited to risk of bleeding, infection, scar and 60% fat take  She understands these risks and wishes to proceed  I personally obtained her informed consent  We discussed harvest sites from the abdominal region and flanks  She has minimal fat but it should be enough for the contour deformity  I discussed the possibility that there may be some skin laxity given that she has abdominal striae from her pregnancies  I recommended a mini abdominoplasty for the skin laxity  She has a minimal rectus diastasis and should not require abdominal wall plication  She requested a quote  We will provide that for her         Diagnoses and all orders for this visit:    Malignant neoplasm of upper-outer quadrant of left breast in female, estrogen receptor positive ([address] ) Subjective:      Patient ID: Nicole Pearson is a 48 y o  female  61-year-old female who is status post left breast lumpectomy with whole breast radiation that ended in April of 2017  She presents with complaint of contour deformity to her left upper outer quadrant  She would like further evaluation for left breast fat grafting  The following portions of the patient's history were reviewed and updated as appropriate: allergies, current medications, past family history, past medical history, past social history, past surgical history and problem list     Review of Systems   Constitutional: Negative  HENT: Negative  Eyes: Negative  Respiratory: Negative  Cardiovascular: Negative  Gastrointestinal: Negative  Endocrine: Negative  Genitourinary: Negative  Musculoskeletal: Negative  Skin: Negative  Allergic/Immunologic: Negative  Neurological: Negative  Hematological: Negative  Psychiatric/Behavioral: Negative            Objective:      Ht 5' 7" (1 702 m)   Wt 54 4 kg (120 lb)   BMI 18 79 kg/m²           Physical Exam   Pulmonary/Chest:

## 2018-10-26 ENCOUNTER — APPOINTMENT (OUTPATIENT)
Dept: LAB | Facility: HOSPITAL | Age: 53
End: 2018-10-26
Attending: INTERNAL MEDICINE
Payer: COMMERCIAL

## 2018-10-26 DIAGNOSIS — C50.412 MALIGNANT NEOPLASM OF UPPER-OUTER QUADRANT OF LEFT BREAST IN FEMALE, ESTROGEN RECEPTOR POSITIVE (HCC): ICD-10-CM

## 2018-10-26 DIAGNOSIS — Z17.0 MALIGNANT NEOPLASM OF UPPER-OUTER QUADRANT OF LEFT BREAST IN FEMALE, ESTROGEN RECEPTOR POSITIVE (HCC): ICD-10-CM

## 2018-10-26 LAB
ALBUMIN SERPL BCP-MCNC: 4 G/DL (ref 3.5–5)
ALP SERPL-CCNC: 49 U/L (ref 46–116)
ALT SERPL W P-5'-P-CCNC: 33 U/L (ref 12–78)
ANION GAP SERPL CALCULATED.3IONS-SCNC: 5 MMOL/L (ref 4–13)
AST SERPL W P-5'-P-CCNC: 25 U/L (ref 5–45)
BASOPHILS # BLD AUTO: 0.04 THOUSANDS/ΜL (ref 0–0.1)
BASOPHILS NFR BLD AUTO: 1 % (ref 0–1)
BILIRUB SERPL-MCNC: 0.36 MG/DL (ref 0.2–1)
BUN SERPL-MCNC: 21 MG/DL (ref 5–25)
CALCIUM SERPL-MCNC: 9.7 MG/DL (ref 8.3–10.1)
CANCER AG27-29 SERPL-ACNC: 19.9 U/ML (ref 0–42.3)
CHLORIDE SERPL-SCNC: 104 MMOL/L (ref 100–108)
CO2 SERPL-SCNC: 29 MMOL/L (ref 21–32)
CREAT SERPL-MCNC: 0.66 MG/DL (ref 0.6–1.3)
EOSINOPHIL # BLD AUTO: 0.04 THOUSAND/ΜL (ref 0–0.61)
EOSINOPHIL NFR BLD AUTO: 1 % (ref 0–6)
ERYTHROCYTE [DISTWIDTH] IN BLOOD BY AUTOMATED COUNT: 13.1 % (ref 11.6–15.1)
GFR SERPL CREATININE-BSD FRML MDRD: 103 ML/MIN/1.73SQ M
GLUCOSE SERPL-MCNC: 98 MG/DL (ref 65–140)
HCT VFR BLD AUTO: 39.8 % (ref 34.8–46.1)
HGB BLD-MCNC: 12.9 G/DL (ref 11.5–15.4)
IMM GRANULOCYTES # BLD AUTO: 0.04 THOUSAND/UL (ref 0–0.2)
IMM GRANULOCYTES NFR BLD AUTO: 1 % (ref 0–2)
LYMPHOCYTES # BLD AUTO: 1.54 THOUSANDS/ΜL (ref 0.6–4.47)
LYMPHOCYTES NFR BLD AUTO: 21 % (ref 14–44)
MCH RBC QN AUTO: 30.3 PG (ref 26.8–34.3)
MCHC RBC AUTO-ENTMCNC: 32.4 G/DL (ref 31.4–37.4)
MCV RBC AUTO: 93 FL (ref 82–98)
MONOCYTES # BLD AUTO: 0.61 THOUSAND/ΜL (ref 0.17–1.22)
MONOCYTES NFR BLD AUTO: 8 % (ref 4–12)
NEUTROPHILS # BLD AUTO: 5.2 THOUSANDS/ΜL (ref 1.85–7.62)
NEUTS SEG NFR BLD AUTO: 68 % (ref 43–75)
NRBC BLD AUTO-RTO: 0 /100 WBCS
PLATELET # BLD AUTO: 238 THOUSANDS/UL (ref 149–390)
PMV BLD AUTO: 12.3 FL (ref 8.9–12.7)
POTASSIUM SERPL-SCNC: 3.8 MMOL/L (ref 3.5–5.3)
PROT SERPL-MCNC: 7.5 G/DL (ref 6.4–8.2)
RBC # BLD AUTO: 4.26 MILLION/UL (ref 3.81–5.12)
SODIUM SERPL-SCNC: 138 MMOL/L (ref 136–145)
WBC # BLD AUTO: 7.47 THOUSAND/UL (ref 4.31–10.16)

## 2018-10-26 PROCEDURE — 85025 COMPLETE CBC W/AUTO DIFF WBC: CPT

## 2018-10-26 PROCEDURE — 36415 COLL VENOUS BLD VENIPUNCTURE: CPT

## 2018-10-26 PROCEDURE — 80053 COMPREHEN METABOLIC PANEL: CPT

## 2018-10-26 PROCEDURE — 86300 IMMUNOASSAY TUMOR CA 15-3: CPT

## 2018-11-12 ENCOUNTER — TELEPHONE (OUTPATIENT)
Dept: HEMATOLOGY ONCOLOGY | Facility: CLINIC | Age: 53
End: 2018-11-12

## 2018-11-12 NOTE — TELEPHONE ENCOUNTER
PT HAD BLOODWORK DRAWN AT  LABS ON 10/26/18  STATING THAT SHE NEVER HEARD ANYTHING ABOUT HER RESULTS    PLEASE CALL TO LEILANI SANCHES

## 2018-11-12 NOTE — TELEPHONE ENCOUNTER
Called and spoke with the patient regarding her 10/26/18 CBC, CMP, and CA 27 29 results  Patient's blood work results were all within normal range  Patient verbally understood

## 2018-12-10 ENCOUNTER — OFFICE VISIT (OUTPATIENT)
Dept: SURGICAL ONCOLOGY | Facility: CLINIC | Age: 53
End: 2018-12-10
Payer: COMMERCIAL

## 2018-12-10 VITALS
HEART RATE: 73 BPM | SYSTOLIC BLOOD PRESSURE: 110 MMHG | TEMPERATURE: 98.3 F | WEIGHT: 121.6 LBS | RESPIRATION RATE: 14 BRPM | HEIGHT: 67 IN | BODY MASS INDEX: 19.09 KG/M2 | DIASTOLIC BLOOD PRESSURE: 60 MMHG

## 2018-12-10 DIAGNOSIS — R92.2 INCONCLUSIVE MAMMOGRAM DUE TO DENSE BREASTS: ICD-10-CM

## 2018-12-10 DIAGNOSIS — Z17.0 MALIGNANT NEOPLASM OF UPPER-OUTER QUADRANT OF LEFT BREAST IN FEMALE, ESTROGEN RECEPTOR POSITIVE (HCC): Primary | ICD-10-CM

## 2018-12-10 DIAGNOSIS — Z79.810 USE OF TAMOXIFEN (NOLVADEX): ICD-10-CM

## 2018-12-10 DIAGNOSIS — C50.412 MALIGNANT NEOPLASM OF UPPER-OUTER QUADRANT OF LEFT BREAST IN FEMALE, ESTROGEN RECEPTOR POSITIVE (HCC): Primary | ICD-10-CM

## 2018-12-10 PROCEDURE — 99214 OFFICE O/P EST MOD 30 MIN: CPT | Performed by: SURGERY

## 2018-12-10 RX ORDER — LOTEPREDNOL ETABONATE 5 MG/G
GEL OPHTHALMIC
COMMUNITY
Start: 2018-11-26 | End: 2019-06-03 | Stop reason: ALTCHOICE

## 2018-12-10 RX ORDER — EPINASTINE HCL 0.05 %
DROPS OPHTHALMIC (EYE)
COMMUNITY
Start: 2018-11-26 | End: 2019-01-21 | Stop reason: ALTCHOICE

## 2018-12-11 NOTE — PROGRESS NOTES
Surgical Oncology Follow Up       [address]  [facility] [address] [address]    Michelle De Anda  1965  [id number]  [address]  [facility] SURGICAL ONCOLOGY [place]  [address] [address]    No chief complaint on file  Assessment/Plan   Diagnoses and all orders for this visit:    Malignant neoplasm of upper-outer quadrant of left breast in female, estrogen receptor positive ([facility] [address] )  -     MRI breast bilateral w wo contrast; Future    Use of tamoxifen (Nolvadex)    Inconclusive mammogram due to dense breasts  -     MRI breast bilateral w wo contrast; Future    Other orders  -     Epinastine HCl 0 05 % ophthalmic solution; INSTILL 1 DROP INTO BOTH EYES TWICE A DAY  -     loteprednol etabonate (LOTEMAX) 0 5 % ophth gel; INSTILL 1 DROP INTO BOTH EYES 3 TIMES A DAY        Advance Care Planning/Advance Directives:  Did not discuss  with the patient  Oncology History:       Malignant neoplasm of upper-outer quadrant of left breast in female, estrogen receptor positive ([facility] [address] )    11/2016 Initial Diagnosis     Malignant neoplasm of upper-outer quadrant of left breast in female, estrogen receptor positive ([facility] [address] )         11/17/2016 Biopsy     Left breast biopsy: invasive and in situ ductal carcinoma          1/3/2017 Surgery     Left lumpectomy and sentinel node biopsy  Final staging: Stage IA IDC, strongly ER/[place] +, HER2-         2017 Genetic Testing     Oncotype score was 10  BRCA test was negative           2/2017 -  Hormone Therapy     Tamoxifen          3/13/2017 - 4/24/2017 Radiation       Plan ID Energy Fractions Dose per Fraction (cGy) Total Dose Delivered (cGy) Elapsed Days   FB L BOOST 9E 5 / 5 200 1,000 6   FB L BREAST 6X 25 / 25 200 5,000 35      Treatment dates:  3/13/2017 - 4/24/2017               History of Present Illness: breast cancer follow up   -Interval History: none    Review of Systems:  Review of Systems   Constitutional: Negative  Negative for appetite change and fever  Eyes: Negative  Respiratory: Negative for shortness of breath  Cardiovascular: Negative  Gastrointestinal: Negative  Endocrine: Negative  Genitourinary: Negative  Musculoskeletal: Negative  Negative for arthralgias and myalgias  Skin: Negative  Allergic/Immunologic: Negative  Neurological: Negative  Hematological: Negative  Negative for adenopathy  Does not bruise/bleed easily     Psychiatric/Behavioral:        Reports anxiety secondary to tamoxifen use       Patient Active Problem List   Diagnosis    Malignant neoplasm of upper-outer quadrant of left breast in female, estrogen receptor positive ([address] )    Right lower quadrant pain    Rectal pain    External hemorrhoid    Narrowing of stools    Vaginal discharge    Encntr for gyn exam (general) (routine) w abnormal findings    Vaginal relaxation    Routine screening for STI (sexually transmitted infection)    Use of tamoxifen (Nolvadex)    Inconclusive mammogram due to dense breasts    Varicose veins of right lower extremity with pain    Coccyx pain     Past Medical History:   Diagnosis Date    Abdominal pain, left lower quadrant     Anxiety     BRCA negative     Common cold     Environmental and seasonal allergies     Exercises 5 to 6 times per week     Food allergy     scallops    Inconclusive mammogram due to dense breasts     Low back pain     Malignant neoplasm of upper-outer quadrant of left breast in female, estrogen receptor positive ([address] )     Seasonal allergies     Use of tamoxifen (Nolvadex)     Vaginal discharge     Vaginal irritation     Vulvar burning     Vulvar irritation      Past Surgical History:   Procedure Laterality Date    APPENDECTOMY      BREAST BIOPSY Left     COLONOSCOPY      MAMMO NEEDLE LOCALIZATION LEFT (ALL INC) Left 1/3/2017    [place] BIOPSY/EXCISION, LYMPH NODE(S) Left 1/3/2017    Procedure: BIOPSY LYMPH NODE SENTINEL @ [address] [facility] [address];  Surgeon: Junaid Siu MD;  [facility];  Service: Surgical Oncology    OR MASTECTOMY, PARTIAL Left 1/3/2017    Procedure: LUMPECTOMY BREAST NEEDLE LOCALIZED @ 0930;  Surgeon: Junaid Siu MD;  [facility];  Service: Surgical Oncology    SENTINEL LYMPH NODE BIOPSY      US GUIDED BREAST BIOPSY LEFT COMPLETE Left 11/17/2016     Family History   Problem Relation Age of Onset    Lung cancer Mother         mother age 61    Lung cancer Father         father age 66    Breast cancer Sister         age dx unk    Breast cancer Paternal Aunt         aunt 50    No Known Problems Brother      Social History     Social History    Marital status: /Civil Union     Spouse name: N/A    Number of children: 2    Years of education: N/A     Occupational History          Social History Main Topics    Smoking status: Former Smoker     Types: Cigarettes     Quit date: 2017    Smokeless tobacco: Never Used    Alcohol use Yes      Comment: occasional     Drug use: No    Sexual activity: Yes     Partners: Male     Other Topics Concern    Not on file     Social History Narrative    Exercises 5-6 times per week    Full time employment    Lives with spouse    No caffeine use    2 children ages 25 and 21       Current Outpatient Prescriptions:     ALPRAZolam (XANAX) 0 25 mg tablet, Take 0 25 mg by mouth daily as needed for anxiety, Disp: , Rfl:     Calcium 500 MG tablet, Take 1 tablet by mouth daily, Disp: , Rfl:     COLLAGEN PO, Take 1 tablet by mouth daily, Disp: , Rfl:     Epinastine HCl 0 05 % ophthalmic solution, INSTILL 1 DROP INTO BOTH EYES TWICE A DAY, Disp: , Rfl:     fexofenadine (ALLEGRA) 180 MG tablet, Take 180 mg by mouth, Disp: , Rfl:     Ibuprofen 200 MG CAPS, Take by mouth as needed, Disp: , Rfl:     loteprednol etabonate (LOTEMAX) 0 5 % ophth gel, INSTILL 1 DROP INTO BOTH EYES 3 TIMES A DAY, Disp: , Rfl:     MILK THISTLE PO, Take 1 tablet by mouth daily, Disp: , Rfl:     Multiple Vitamins-Minerals (MULTIVITAMIN ADULT PO), Take 1 tablet by mouth daily, Disp: , Rfl:     tamoxifen (NOLVADEX) 20 mg tablet, Take 1 tablet by mouth daily, Disp: , Rfl:   Allergies   Allergen Reactions    Other Other (See Comments)     Scallops-pt states she has swelling and vomiting  Also seasonal        The following portions of the patient's history were reviewed and updated as appropriate: allergies, current medications, past family history, past medical history, past social history, past surgical history and problem list         Vitals:    12/10/18 1021   BP: 110/60   Pulse: 73   Resp: 14   Temp: 98 3 °F (36 8 °C)       Physical Exam   Constitutional: She is oriented to person, place, and time  She appears well-developed and well-nourished  HENT:   Head: Normocephalic and atraumatic  Cardiovascular: Normal heart sounds  Pulmonary/Chest: Breath sounds normal  Right breast exhibits no inverted nipple, no mass, no nipple discharge, no skin change and no tenderness  Left breast exhibits skin change (lumpectomy scar)  Left breast exhibits no inverted nipple, no mass, no nipple discharge and no tenderness  Breasts are asymmetrical    Abdominal: Soft  Lymphadenopathy:        Right axillary: No pectoral and no lateral adenopathy present  Left axillary: No pectoral and no lateral adenopathy present  Right: No supraclavicular adenopathy present  Left: No supraclavicular adenopathy present  Neurological: She is alert and oriented to person, place, and time  Psychiatric: She has a normal mood and affect  Results:      Imaging  08/13/2018 bilateral 3D diagnostic mammogram is benign BI-RADS two with a density of four    I reviewed the above imaging data  Discussion/Summary:  61-year-old female status post left breast conservation for stage I carcinoma diagnosed the end of 2016    She continues on tamoxifen  She does not like how it makes her feel but she will continue taking this  I advised her to discuss this further with Dr Fred Melton  There are no concerns on examination today  She does have breast asymmetry  She met with Dr Crystal Oseguera to discuss fat grafting and will plan on having this in the near term  Her recent mammogram was also benign  She continues to have extremely dense breast tissue  I am therefore recommending an MRI roughly six months off of the mammogram   I will plan on seeing her again in six months for another exam or sooner should the need arise

## 2019-01-04 ENCOUNTER — TELEPHONE (OUTPATIENT)
Dept: HEMATOLOGY ONCOLOGY | Facility: CLINIC | Age: 54
End: 2019-01-04

## 2019-01-04 DIAGNOSIS — C50.412 MALIGNANT NEOPLASM OF UPPER-OUTER QUADRANT OF LEFT BREAST IN FEMALE, ESTROGEN RECEPTOR POSITIVE (HCC): Primary | ICD-10-CM

## 2019-01-04 DIAGNOSIS — Z17.0 MALIGNANT NEOPLASM OF UPPER-OUTER QUADRANT OF LEFT BREAST IN FEMALE, ESTROGEN RECEPTOR POSITIVE (HCC): Primary | ICD-10-CM

## 2019-01-04 NOTE — TELEPHONE ENCOUNTER
Called to instruct Nikki Esposito to have her labs completed prior to her appt  On 01/07/18  She needed new lab orders, placed the orders and she is going to go and get them done over the weekend

## 2019-01-07 ENCOUNTER — CLINICAL SUPPORT (OUTPATIENT)
Dept: RADIATION ONCOLOGY | Facility: HOSPITAL | Age: 54
End: 2019-01-07
Attending: RADIOLOGY
Payer: COMMERCIAL

## 2019-01-07 ENCOUNTER — OFFICE VISIT (OUTPATIENT)
Dept: HEMATOLOGY ONCOLOGY | Facility: CLINIC | Age: 54
End: 2019-01-07
Payer: COMMERCIAL

## 2019-01-07 ENCOUNTER — APPOINTMENT (OUTPATIENT)
Dept: LAB | Facility: HOSPITAL | Age: 54
End: 2019-01-07
Attending: INTERNAL MEDICINE
Payer: COMMERCIAL

## 2019-01-07 VITALS
SYSTOLIC BLOOD PRESSURE: 102 MMHG | HEIGHT: 67 IN | RESPIRATION RATE: 18 BRPM | WEIGHT: 122.2 LBS | DIASTOLIC BLOOD PRESSURE: 60 MMHG | HEART RATE: 72 BPM | BODY MASS INDEX: 19.18 KG/M2 | TEMPERATURE: 98.7 F | OXYGEN SATURATION: 97 %

## 2019-01-07 VITALS
BODY MASS INDEX: 19.19 KG/M2 | WEIGHT: 122.3 LBS | OXYGEN SATURATION: 97 % | SYSTOLIC BLOOD PRESSURE: 104 MMHG | HEART RATE: 72 BPM | TEMPERATURE: 98.7 F | RESPIRATION RATE: 16 BRPM | HEIGHT: 67 IN | DIASTOLIC BLOOD PRESSURE: 62 MMHG

## 2019-01-07 DIAGNOSIS — M53.3 COCCYX PAIN: ICD-10-CM

## 2019-01-07 DIAGNOSIS — C50.412 MALIGNANT NEOPLASM OF UPPER-OUTER QUADRANT OF LEFT BREAST IN FEMALE, ESTROGEN RECEPTOR POSITIVE (HCC): ICD-10-CM

## 2019-01-07 DIAGNOSIS — C50.412 MALIGNANT NEOPLASM OF UPPER-OUTER QUADRANT OF LEFT BREAST IN FEMALE, ESTROGEN RECEPTOR POSITIVE (HCC): Primary | ICD-10-CM

## 2019-01-07 DIAGNOSIS — Z17.0 MALIGNANT NEOPLASM OF UPPER-OUTER QUADRANT OF LEFT BREAST IN FEMALE, ESTROGEN RECEPTOR POSITIVE (HCC): ICD-10-CM

## 2019-01-07 DIAGNOSIS — R12 HEARTBURN: ICD-10-CM

## 2019-01-07 DIAGNOSIS — Z17.0 MALIGNANT NEOPLASM OF UPPER-OUTER QUADRANT OF LEFT BREAST IN FEMALE, ESTROGEN RECEPTOR POSITIVE (HCC): Primary | ICD-10-CM

## 2019-01-07 LAB
ALBUMIN SERPL BCP-MCNC: 3.6 G/DL (ref 3.5–5)
ALP SERPL-CCNC: 51 U/L (ref 46–116)
ALT SERPL W P-5'-P-CCNC: 29 U/L (ref 12–78)
ANION GAP SERPL CALCULATED.3IONS-SCNC: 5 MMOL/L (ref 4–13)
AST SERPL W P-5'-P-CCNC: 23 U/L (ref 5–45)
BASOPHILS # BLD AUTO: 0.05 THOUSANDS/ΜL (ref 0–0.1)
BASOPHILS NFR BLD AUTO: 1 % (ref 0–1)
BILIRUB SERPL-MCNC: 0.45 MG/DL (ref 0.2–1)
BUN SERPL-MCNC: 16 MG/DL (ref 5–25)
CALCIUM SERPL-MCNC: 8.7 MG/DL (ref 8.3–10.1)
CHLORIDE SERPL-SCNC: 106 MMOL/L (ref 100–108)
CO2 SERPL-SCNC: 29 MMOL/L (ref 21–32)
CREAT SERPL-MCNC: 0.6 MG/DL (ref 0.6–1.3)
EOSINOPHIL # BLD AUTO: 0.18 THOUSAND/ΜL (ref 0–0.61)
EOSINOPHIL NFR BLD AUTO: 3 % (ref 0–6)
ERYTHROCYTE [DISTWIDTH] IN BLOOD BY AUTOMATED COUNT: 13.2 % (ref 11.6–15.1)
GFR SERPL CREATININE-BSD FRML MDRD: 106 ML/MIN/1.73SQ M
GLUCOSE P FAST SERPL-MCNC: 87 MG/DL (ref 65–99)
HCT VFR BLD AUTO: 38.5 % (ref 34.8–46.1)
HGB BLD-MCNC: 12.2 G/DL (ref 11.5–15.4)
IMM GRANULOCYTES # BLD AUTO: 0.03 THOUSAND/UL (ref 0–0.2)
IMM GRANULOCYTES NFR BLD AUTO: 1 % (ref 0–2)
LYMPHOCYTES # BLD AUTO: 1.55 THOUSANDS/ΜL (ref 0.6–4.47)
LYMPHOCYTES NFR BLD AUTO: 27 % (ref 14–44)
MCH RBC QN AUTO: 29.9 PG (ref 26.8–34.3)
MCHC RBC AUTO-ENTMCNC: 31.7 G/DL (ref 31.4–37.4)
MCV RBC AUTO: 94 FL (ref 82–98)
MONOCYTES # BLD AUTO: 0.68 THOUSAND/ΜL (ref 0.17–1.22)
MONOCYTES NFR BLD AUTO: 12 % (ref 4–12)
NEUTROPHILS # BLD AUTO: 3.21 THOUSANDS/ΜL (ref 1.85–7.62)
NEUTS SEG NFR BLD AUTO: 56 % (ref 43–75)
NRBC BLD AUTO-RTO: 0 /100 WBCS
PLATELET # BLD AUTO: 237 THOUSANDS/UL (ref 149–390)
PMV BLD AUTO: 11.7 FL (ref 8.9–12.7)
POTASSIUM SERPL-SCNC: 4 MMOL/L (ref 3.5–5.3)
PROT SERPL-MCNC: 7.2 G/DL (ref 6.4–8.2)
RBC # BLD AUTO: 4.08 MILLION/UL (ref 3.81–5.12)
SODIUM SERPL-SCNC: 140 MMOL/L (ref 136–145)
WBC # BLD AUTO: 5.7 THOUSAND/UL (ref 4.31–10.16)

## 2019-01-07 PROCEDURE — 99214 OFFICE O/P EST MOD 30 MIN: CPT | Performed by: RADIOLOGY

## 2019-01-07 PROCEDURE — 86300 IMMUNOASSAY TUMOR CA 15-3: CPT

## 2019-01-07 PROCEDURE — 36415 COLL VENOUS BLD VENIPUNCTURE: CPT

## 2019-01-07 PROCEDURE — 99214 OFFICE O/P EST MOD 30 MIN: CPT | Performed by: INTERNAL MEDICINE

## 2019-01-07 PROCEDURE — 85025 COMPLETE CBC W/AUTO DIFF WBC: CPT

## 2019-01-07 PROCEDURE — 80053 COMPREHEN METABOLIC PANEL: CPT

## 2019-01-07 NOTE — PROGRESS NOTES
Follow-up - Radiation Oncology   Michelle De Anda 1965 48 y o  female [id number]      History of Present Illness   Cancer Staging  Malignant neoplasm of upper-outer quadrant of left breast in female, estrogen receptor positive ([facility] [address] )  Staging form: Breast, AJCC 7th Edition  - Clinical: Stage IA (T1c, N0, M0) - Signed by Vania Helms MD on 5/7/2018  - Pathologic: Stage IA (T1c, N0, cM0) - Signed by Katlyn Joe MD on 6/4/2018      Michelle De Anda is a 48y o  year old female with a history of left breast cancer      Interval History:  Last seen in follow up on 5/7/18 7/2/18 Dr Rogelio Murguia  On Tamoxifen  discussed making her postmenopausal now either by ovarian suppression or BSO  She would prefer to go into postmenopausal status naturally especially when her periods are getting less frequent      8/13/18 Bilateral diagnostic mammogram   Left breast upper-outer quadrant postsurgical scarring and distortion has expected postoperative appearance     There are no suspicious masses, grouped microcalcifications or   unexplained areas of architectural distortion in either breast   IMPRESSION  Benign findings as above      ACR BI-RADS® Assessments: BiRad:2 - Benign     10/25/18 Enmanuel Pina MD for contour deformity to her left upper outer quadrant    Fat grafting discussed      12/10/18 Dr Karla Andrade  Breast asymmetry and seeing Dr Di Simms to discuss fat grafting  Breast MRI scheduled due to dense breast tissue     1/7/19 Dr Rogelio Murguia     2/7/19 Bilateral breast MRI ordered     4/8/19 Med Onc follow-up, Joey Olson PA-C     6/10/19 Dr Karla Andrade  She denies any breast complaints     She deniesHistorical Information      Malignant neoplasm of upper-outer quadrant of left breast in female, estrogen receptor positive ([address] )    11/2016 Initial Diagnosis     Malignant neoplasm of upper-outer quadrant of left breast in female, estrogen receptor positive ([address] )         11/17/2016 Biopsy     Left breast biopsy: invasive and in situ ductal carcinoma          1/3/2017 Surgery     Left lumpectomy and sentinel node biopsy  Final staging: Stage IA IDC, strongly ER/[place] +, HER2-         2017 Genetic Testing     Oncotype score was 10  BRCA test was negative           2/2017 -  Hormone Therapy     Tamoxifen          3/13/2017 - 4/24/2017 Radiation     left breast to a dose of 5000 cGy followed by an additional 1000 cGy to the primary site            Past Medical History:   Diagnosis Date    Abdominal pain, left lower quadrant     Anxiety     BRCA negative     Common cold     Environmental and seasonal allergies     Exercises 5 to 6 times per week     Food allergy     scallops    Inconclusive mammogram due to dense breasts     Low back pain     Malignant neoplasm of upper-outer quadrant of left breast in female, estrogen receptor positive (HCC)     Seasonal allergies     Use of tamoxifen (Nolvadex)     Vaginal discharge     Vaginal irritation     Vulvar burning     Vulvar irritation      Past Surgical History:   Procedure Laterality Date    APPENDECTOMY      BREAST BIOPSY Left     COLONOSCOPY      MAMMO NEEDLE LOCALIZATION LEFT (ALL INC) Left 1/3/2017    [place] BIOPSY/EXCISION, LYMPH NODE(S) Left 1/3/2017    Procedure: BIOPSY LYMPH NODE SENTINEL @ [address];  Surgeon: Raul Orozco MD;  [facility];  Service: Surgical Oncology    IL MASTECTOMY, PARTIAL Left 1/3/2017    Procedure: LUMPECTOMY BREAST NEEDLE LOCALIZED @ 0930;  Surgeon: Raul Orozco MD;  [facility];  Service: Surgical Oncology    SENTINEL LYMPH NODE BIOPSY      US GUIDED BREAST BIOPSY LEFT COMPLETE Left 11/17/2016       Social History   History   Alcohol Use    Yes     Comment: occasional      History   Drug Use No     History   Smoking Status    Former Smoker    Types: Cigarettes    Quit date: 2017   Smokeless Tobacco    Never Used         Meds/Allergies     Current Outpatient Prescriptions:     ALPRAZolam (XANAX) 0 25 mg tablet, Take 0 25 mg by mouth daily as needed for anxiety, Disp: , Rfl:     Calcium 500 MG tablet, Take 1 tablet by mouth daily, Disp: , Rfl:     Carboxymethylcellulose Sodium (EYE DROPS OP), Apply to eye, Disp: , Rfl:     COLLAGEN PO, Take 1 tablet by mouth daily, Disp: , Rfl:     Epinastine HCl 0 05 % ophthalmic solution, INSTILL 1 DROP INTO BOTH EYES TWICE A DAY, Disp: , Rfl:     fexofenadine (ALLEGRA) 180 MG tablet, Take 180 mg by mouth, Disp: , Rfl:     Ibuprofen 200 MG CAPS, Take by mouth as needed, Disp: , Rfl:     loteprednol etabonate (LOTEMAX) 0 5 % ophth gel, INSTILL 1 DROP INTO BOTH EYES 3 TIMES A DAY, Disp: , Rfl:     MILK THISTLE PO, Take 1 tablet by mouth daily, Disp: , Rfl:     Multiple Vitamins-Minerals (MULTIVITAMIN ADULT PO), Take 1 tablet by mouth daily, Disp: , Rfl:     tamoxifen (NOLVADEX) 20 mg tablet, Take 1 tablet by mouth daily, Disp: , Rfl:   Allergies   Allergen Reactions    Other Other (See Comments)     Scallops-pt states she has swelling and vomiting  Also seasonal          Review of Systems  Constitutional: Negative  HENT: Negative  Eyes: Negative  Respiratory: Negative  Cardiovascular: Negative  Gastrointestinal: Negative  Genitourinary:        Menstruates every other month   Musculoskeletal:        4/10 coccyx bone, takes motrin prn   Skin: Negative  Allergic/Immunologic: Negative  Neurological: Negative  Hematological: Negative  Psychiatric/Behavioral: Negative            OBJECTIVE:   /62 (BP Location: Right arm)   Pulse 72   Temp 98 7 °F (37 1 °C) (Temporal)   Resp 16   Ht 5' 7" (1 702 m)   Wt 55 5 kg (122 lb 4 8 oz)   SpO2 97%   BMI 19 15 kg/m²   Pain Assessment:  0  ECOG/Zubrod/WHO: 0 - Asymptomatic    Physical Exam   Constitutional: She appears well-developed  HENT:   Head: Normocephalic  Hair is normal    Mouth/Throat: Oropharynx is clear and moist    Eyes: EOM are normal  No scleral icterus  Neck: Neck supple   No spinous process tenderness present  No edema present  Cardiovascular: Normal rate and regular rhythm  Pulmonary/Chest: Effort normal and breath sounds normal  No respiratory distress  She has no wheezes  She exhibits no tenderness  There is no supraclavicular axillary adenopathy palpable  The skin in the radiated field is in good condition  No breast or arm edema  No suspicious lesions palpable in either breast    Abdominal: Soft  Bowel sounds are normal  She exhibits no distension, no ascites and no mass  There is no hepatosplenomegaly  There is no tenderness  There is no rebound  Genitourinary: Vagina normal  No breast swelling or tenderness  Musculoskeletal: Normal range of motion  She exhibits no edema or tenderness  Lymphadenopathy:     She has no cervical adenopathy  She has no axillary adenopathy  Neurological: She is alert  She has normal strength  No cranial nerve deficit  Coordination and gait normal    Skin: Skin is intact  Psychiatric: She has a normal mood and affect   Her speech is normal and behavior is normal            RESULTS    Lab Results:   Recent Results (from the past 672 hour(s))   CBC and differential    Collection Time: 01/07/19  7:37 AM   Result Value Ref Range    WBC 5 70 4 31 - 10 16 Thousand/uL    RBC 4 08 3 81 - 5 12 Million/uL    Hemoglobin 12 2 11 5 - 15 4 g/dL    Hematocrit 38 5 34 8 - 46 1 %    MCV 94 82 - 98 fL    MCH 29 9 26 8 - 34 3 pg    MCHC 31 7 31 4 - 37 4 g/dL    RDW 13 2 11 6 - 15 1 %    MPV 11 7 8 9 - 12 7 fL    Platelets 675 958 - 768 Thousands/uL    nRBC 0 /100 WBCs    Neutrophils Relative 56 43 - 75 %    Immat GRANS % 1 0 - 2 %    Lymphocytes Relative 27 14 - 44 %    Monocytes Relative 12 4 - 12 %    Eosinophils Relative 3 0 - 6 %    Basophils Relative 1 0 - 1 %    Neutrophils Absolute 3 21 1 85 - 7 62 Thousands/µL    Immature Grans Absolute 0 03 0 00 - 0 20 Thousand/uL    Lymphocytes Absolute 1 55 0 60 - 4 47 Thousands/µL    Monocytes Absolute 0  68 0 17 - 1 22 Thousand/µL    Eosinophils Absolute 0 18 0 00 - 0 61 Thousand/µL    Basophils Absolute 0 05 0 00 - 0 10 Thousands/µL   Comprehensive metabolic panel    Collection Time: 01/07/19  7:37 AM   Result Value Ref Range    Sodium 140 136 - 145 mmol/L    Potassium 4 0 3 5 - 5 3 mmol/L    Chloride 106 100 - 108 mmol/L    CO2 29 21 - 32 mmol/L    ANION GAP 5 4 - 13 mmol/L    BUN 16 5 - 25 mg/dL    Creatinine 0 60 0 60 - 1 30 mg/dL    Glucose, Fasting 87 65 - 99 mg/dL    Calcium 8 7 8 3 - 10 1 mg/dL    AST 23 5 - 45 U/L    ALT 29 12 - 78 U/L    Alkaline Phosphatase 51 46 - 116 U/L    Total Protein 7 2 6 4 - 8 2 g/dL    Albumin 3 6 3 5 - 5 0 g/dL    Total Bilirubin 0 45 0 20 - 1 00 mg/dL    eGFR 106 ml/min/1 73sq m       Imaging Studies:No results found  Assessment/Plan:  No orders of the defined types were placed in this encounter  Michelle De Anda is a 48y o  year old female who is nearly 2 years post adjuvant radiation therapy for left breast carcinoma  She has no clinical evidence of recurrence  She remains on tamoxifen  She plans to undergo MRI of the breast in February  Her last mammogram from 8/13/2018 returned stable  Vania Helms MD  1/7/2019,10:47 AM    Portions of the record may have been created with voice recognition software   Occasional wrong word or "sound a like" substitutions may have occurred due to the inherent limitations of voice recognition software   Read the chart carefully and recognize, using context, where substitutions have occurred

## 2019-01-07 NOTE — PROGRESS NOTES
Cloretta Pitch  1965   Ms Idris Padilla is a 48 y o  female       Chief Complaint   Patient presents with    Follow-up     Rad Onc       Cancer Staging  Malignant neoplasm of upper-outer quadrant of left breast in female, estrogen receptor positive ([facility] [address] )  Staging form: Breast, AJCC 7th Edition  - Clinical: Stage IA (T1c, N0, M0) - Signed by Saima Julian MD on 5/7/2018  - Pathologic: Stage IA (T1c, N0, cM0) - Signed by Kina Palafox MD on 6/4/2018      Oncology History    Last seen in follow up on 5/7/18 7/2/18 Dr Anshul Diaz  On Tamoxifen  discussed making her postmenopausal now either by ovarian suppression or BSO  She would prefer to go into postmenopausal status naturally especially when her periods are getting less frequent  8/13/18 Bilateral diagnostic mammogram   Left breast upper-outer quadrant postsurgical scarring and distortion has expected postoperative appearance  There are no suspicious masses, grouped microcalcifications or   unexplained areas of architectural distortion in either breast   IMPRESSION  Benign findings as above      ACR BI-RADS® Assessments: BiRad:2 - Benign    10/25/18 Diana Pina MD for contour deformity to her left upper outer quadrant  Fat grafting discussed  12/10/18 Dr Sunita Schmitz  Breast asymmetry and seeing Dr Jose Rivas to discuss fat grafting  Breast MRI scheduled due to dense breast tissue    1/7/19 Dr Anshul Diaz    2/7/19 Bilateral breast MRI ordered    4/8/19 Med Onc follow-up, Jose Daniel Jacob PA-C     6/10/19 Dr Sunita Schmitz          Malignant neoplasm of upper-outer quadrant of left breast in female, estrogen receptor positive ([facility] [address] )    11/2016 Initial Diagnosis     Malignant neoplasm of upper-outer quadrant of left breast in female, estrogen receptor positive ([facility] [address] )         11/17/2016 Biopsy     Left breast biopsy: invasive and in situ ductal carcinoma          1/3/2017 Surgery     Left lumpectomy and sentinel node biopsy   Final staging: Stage IA IDC, strongly ER/NC +, HER2-         2017 Genetic Testing     Oncotype score was 10  BRCA test was negative  2/2017 -  Hormone Therapy     Tamoxifen          3/13/2017 - 4/24/2017 Radiation     left breast to a dose of 5000 cGy followed by an additional 1000 cGy to the primary site            Clinical Trial: No    Screening  Tobacco  Current tobacco user: no  If yes, brief counseling provided: No    Hypertension  Hypertension screening performed: yes  Normotensive:  yes  If no, referred to PCP: no    Depression Screening  Screened for depression using PHQ-2: yes    Screened for depression using PHQ-9:  no  Screening positive or negative:  negative  If score >4, was any of the following actions taken?    Additional evaluation for depression, suicide risk assesment, referral to PCP or psychiatry, medication started:  no    Advanced Care Planning for Patients >65 years  Advanced Care Planning Discussed:  yes  Patient named surrogate decision maker or care plan in chart: no    Health Maintenance   Topic Date Due    Depression Screening PHQ  1965    CRC Screening: Colonoscopy  1965    Pneumococcal PPSV23 Highest Risk Adult (1 of 3 - PCV13) 06/24/1986    DTaP,Tdap,and Td Vaccines (2 - Td) 02/15/2026    INFLUENZA VACCINE  Completed       Patient Active Problem List   Diagnosis    Malignant neoplasm of upper-outer quadrant of left breast in female, estrogen receptor positive ([address] )    Right lower quadrant pain    Rectal pain    External hemorrhoid    Narrowing of stools    Vaginal discharge    Encntr for gyn exam (general) (routine) w abnormal findings    Vaginal relaxation    Routine screening for STI (sexually transmitted infection)    Use of tamoxifen (Nolvadex)    Inconclusive mammogram due to dense breasts    Varicose veins of right lower extremity with pain    Coccyx pain    Heartburn     Past Medical History:   Diagnosis Date    Abdominal pain, left lower quadrant     Anxiety     BRCA negative     Common cold     Environmental and seasonal allergies     Exercises 5 to 6 times per week     Food allergy     scallops    Inconclusive mammogram due to dense breasts     Low back pain     Malignant neoplasm of upper-outer quadrant of left breast in female, estrogen receptor positive (HCC)     Seasonal allergies     Use of tamoxifen (Nolvadex)     Vaginal discharge     Vaginal irritation     Vulvar burning     Vulvar irritation      Past Surgical History:   Procedure Laterality Date    APPENDECTOMY      BREAST BIOPSY Left     COLONOSCOPY      MAMMO NEEDLE LOCALIZATION LEFT (ALL INC) Left 1/3/2017    DC BIOPSY/EXCISION, LYMPH NODE(S) Left 1/3/2017    Procedure: BIOPSY LYMPH NODE SENTINEL @ [address];  Surgeon: Lola Vogt MD;  Location: [facility];  Service: Surgical Oncology    DC MASTECTOMY, PARTIAL Left 1/3/2017    Procedure: LUMPECTOMY BREAST NEEDLE LOCALIZED @ 0930;  Surgeon: Lola Vogt MD;  Location: [facility];  Service: Surgical Oncology    SENTINEL LYMPH NODE BIOPSY      US GUIDED BREAST BIOPSY LEFT COMPLETE Left 11/17/2016     Family History   Problem Relation Age of Onset    Lung cancer Mother         mother age 61    Lung cancer Father         father age 66    Breast cancer Sister         age dx unk    Breast cancer Paternal Aunt         aunt 50    No Known Problems Brother      Social History     Social History    Marital status: /Civil Union     Spouse name: N/A    Number of children: 2    Years of education: N/A     Occupational History          Social History Main Topics    Smoking status: Former Smoker     Types: Cigarettes     Quit date: 2017    Smokeless tobacco: Never Used    Alcohol use Yes      Comment: occasional     Drug use: No    Sexual activity: Yes     Partners: Male     Other Topics Concern    Not on file     Social History Narrative    Exercises 5-6 times per week    Full time employment    Lives with spouse No caffeine use    2 children ages 25 and 21       Current Outpatient Prescriptions:     ALPRAZolam (XANAX) 0 25 mg tablet, Take 0 25 mg by mouth daily as needed for anxiety, Disp: , Rfl:     Calcium 500 MG tablet, Take 1 tablet by mouth daily, Disp: , Rfl:     Carboxymethylcellulose Sodium (EYE DROPS OP), Apply to eye, Disp: , Rfl:     COLLAGEN PO, Take 1 tablet by mouth daily, Disp: , Rfl:     Epinastine HCl 0 05 % ophthalmic solution, INSTILL 1 DROP INTO BOTH EYES TWICE A DAY, Disp: , Rfl:     fexofenadine (ALLEGRA) 180 MG tablet, Take 180 mg by mouth, Disp: , Rfl:     Ibuprofen 200 MG CAPS, Take by mouth as needed, Disp: , Rfl:     loteprednol etabonate (LOTEMAX) 0 5 % ophth gel, INSTILL 1 DROP INTO BOTH EYES 3 TIMES A DAY, Disp: , Rfl:     MILK THISTLE PO, Take 1 tablet by mouth daily, Disp: , Rfl:     Multiple Vitamins-Minerals (MULTIVITAMIN ADULT PO), Take 1 tablet by mouth daily, Disp: , Rfl:     tamoxifen (NOLVADEX) 20 mg tablet, Take 1 tablet by mouth daily, Disp: , Rfl:   Allergies   Allergen Reactions    Other Other (See Comments)     Scallops-pt states she has swelling and vomiting  Also seasonal        Review of Systems:  Review of Systems   Constitutional: Negative  HENT: Negative  Eyes: Negative  Respiratory: Negative  Cardiovascular: Negative  Gastrointestinal: Negative  Genitourinary:        Menstruates every other month   Musculoskeletal:        4/10 coccyx bone, takes motrin prn   Skin: Negative  Allergic/Immunologic: Negative  Neurological: Negative  Hematological: Negative  Psychiatric/Behavioral: Negative  Vitals:    01/07/19 1026   BP: 104/62   BP Location: Right arm   Pulse: 72   Resp: 16   Temp: 98 7 °F (37 1 °C)   TempSrc: Temporal   SpO2: 97%   Weight: 55 5 kg (122 lb 4 8 oz)   Height: 5' 7" (1 702 m)            Imaging:No results found

## 2019-01-07 NOTE — LETTER
January 7, 2019     Jennifer Ivan, DO  [address] [address]    Patient: Parul Redd   YOB: 1965   Date of Visit: 1/7/2019       Dear Dr Jazz Kulkarni: Thank you for referring [place] Judi to me for evaluation  Below are my notes for this consultation  If you have questions, please do not hesitate to call me  I look forward to following your patient along with you  Sincerely,        Adriano Rodrigues MD        CC: No Recipients  Adriano Rodrigues MD  1/7/2019 11:36 AM  Sign at close encounter    HPI:    In January 2017 patient had lumpectomy and sentinel lymph node sampling for hormone receptor positive, HER-2 negative, G1, T1c, 1 4 cm, stage I invasive mammary carcinoma in the left breast    Oncotype score was 10  BRCA test was negative  Since February 2017 she has been on tamoxifen  She received radiation  She has noticed some thinning of the hair on tamoxifen  Hot flashes  Mood swings  Anxiety Patient is premenopausal   History thinning of hair, mood swings and anxiety  She had pain in the coccyx at last visit  She did not go for x-ray because pain went away  She states sometimes pain comes back  She will go for x-ray this time  History of heartburn for the last 1 month  She will try Zantac and is being referred to GI service  She states her menstrual periods are scanty and going away                  Current Outpatient Prescriptions:     ALPRAZolam (XANAX) 0 25 mg tablet, Take 0 25 mg by mouth daily as needed for anxiety, Disp: , Rfl:     Calcium 500 MG tablet, Take 1 tablet by mouth daily, Disp: , Rfl:     Carboxymethylcellulose Sodium (EYE DROPS OP), Apply to eye, Disp: , Rfl:     COLLAGEN PO, Take 1 tablet by mouth daily, Disp: , Rfl:     fexofenadine (ALLEGRA) 180 MG tablet, Take 180 mg by mouth, Disp: , Rfl:     Ibuprofen 200 MG CAPS, Take by mouth as needed, Disp: , Rfl:     MILK THISTLE PO, Take 1 tablet by mouth daily, Disp: , Rfl:     Multiple Vitamins-Minerals (MULTIVITAMIN ADULT PO), Take 1 tablet by mouth daily, Disp: , Rfl:     tamoxifen (NOLVADEX) 20 mg tablet, Take 1 tablet by mouth daily, Disp: , Rfl:     Epinastine HCl 0 05 % ophthalmic solution, INSTILL 1 DROP INTO BOTH EYES TWICE A DAY, Disp: , Rfl:     loteprednol etabonate (LOTEMAX) 0 5 % ophth gel, INSTILL 1 DROP INTO BOTH EYES 3 TIMES A DAY, Disp: , Rfl:     Allergies   Allergen Reactions    Other Other (See Comments)     Scallops-pt states she has swelling and vomiting  Also seasonal        Oncology History    Last seen in follow up on 5/7/18 7/2/18 Dr Caba Staff  On Tamoxifen  discussed making her postmenopausal now either by ovarian suppression or BSO  She would prefer to go into postmenopausal status naturally especially when her periods are getting less frequent  8/13/18 Bilateral diagnostic mammogram   Left breast upper-outer quadrant postsurgical scarring and distortion has expected postoperative appearance  There are no suspicious masses, grouped microcalcifications or   unexplained areas of architectural distortion in either breast   IMPRESSION  Benign findings as above      ACR BI-RADS® Assessments: BiRad:2 - Benign    10/25/18 Mana Jerome MD for contour deformity to her left upper outer quadrant  Fat grafting discussed  12/10/18 Dr Quinn Lynn  Breast asymmetry and seeing Dr Amna Snowden to discuss fat grafting  Breast MRI scheduled due to dense breast tissue    1/7/19 Dr Caba Staff    2/1/19 Bilateral breast MRI ordered     6/10/19 Dr Quinn Lynn          Malignant neoplasm of upper-outer quadrant of left breast in female, estrogen receptor positive ([address] )    11/2016 Initial Diagnosis     Malignant neoplasm of upper-outer quadrant of left breast in female, estrogen receptor positive ([address] )         11/17/2016 Biopsy     Left breast biopsy: invasive and in situ ductal carcinoma          1/3/2017 Surgery     Left lumpectomy and sentinel node biopsy   Final staging: Stage IA IDC, strongly ER/NV +, HER2-         2017 Genetic Testing     Oncotype score was 10  BRCA test was negative  2/2017 -  Hormone Therapy     Tamoxifen          3/13/2017 - 4/24/2017 Radiation     left breast to a dose of 5000 cGy followed by an additional 1000 cGy to the primary site            ROS:  01/07/19 Reviewed 13 systems:  Presently no other neurological, cardiac, pulmonary, GI and  symptoms other than listed above  No other symptoms like   fever, chills, bleeding, bone pains, skin rash, weight loss, arthritic symptoms, tiredness ,  weakness, numbness,  claudication and gait problem  No frequent infections  Not unusually sensitive to heat or cold  No swelling of the ankles  No swollen glands  Patient is anxious  Other symptoms are in HPI        /60 (BP Location: Right arm, Patient Position: Sitting, Cuff Size: Adult)   Pulse 72   Temp 98 7 °F (37 1 °C)   Resp 18   Ht 5' 7" (1 702 m)   Wt 55 4 kg (122 lb 3 2 oz)   SpO2 97%   BMI 19 14 kg/m²      Physical Exam:  My nurse Sam Garza was with me in the room during examination    Patient is alert and oriented  She is not in distress  Blood pressure is low normal   No icterus  No oral thrush  There is no palpable neck mass  Lung fields are clear to percussion and auscultation  Heart rate is regular  Abdomen is soft  It is slightly tender in the epigastric area  No   palpable abdominal mass, no ascites, no edema of ankles, no calf tenderness, no focal neurological deficit, no skin rash, no palpable lymphadenopathy in the neck and axillary areas, good arterial pulses, no clubbing  Patient is anxious  Performance status 0  Patient goes to her breast surgeon for examination and imaging studies      IMAGING:      LABS:  Results for orders placed or performed in visit on 01/07/19   Comprehensive metabolic panel   Result Value Ref Range    Sodium 140 136 - 145 mmol/L    Potassium 4 0 3 5 - 5 3 mmol/L    Chloride 106 100 - 108 mmol/L    CO2 29 21 - 32 mmol/L    ANION GAP 5 4 - 13 mmol/L    BUN 16 5 - 25 mg/dL    Creatinine 0 60 0 60 - 1 30 mg/dL    Glucose, Fasting 87 65 - 99 mg/dL    Calcium 8 7 8 3 - 10 1 mg/dL    AST 23 5 - 45 U/L    ALT 29 12 - 78 U/L    Alkaline Phosphatase 51 46 - 116 U/L    Total Protein 7 2 6 4 - 8 2 g/dL    Albumin 3 6 3 5 - 5 0 g/dL    Total Bilirubin 0 45 0 20 - 1 00 mg/dL    eGFR 106 ml/min/1 73sq m     Labs, Imaging, & Other studies:   All pertinent labs and imaging studies were personally reviewed    Lab Results   Component Value Date    K 4 0 01/07/2019     01/07/2019    CO2 29 01/07/2019    BUN 16 01/07/2019    CREATININE 0 60 01/07/2019    GLUF 87 01/07/2019    CALCIUM 8 7 01/07/2019    AST 23 01/07/2019    ALT 29 01/07/2019    ALKPHOS 51 01/07/2019    EGFR 106 01/07/2019     Lab Results   Component Value Date    WBC 5 70 01/07/2019    HGB 12 2 01/07/2019    HCT 38 5 01/07/2019    MCV 94 01/07/2019     01/07/2019    Normal differential count  Tumor marker is pending    Reviewed and discussed with patient  Assessment and plan: In January 2017 patient had lumpectomy and sentinel lymph node sampling for hormone receptor positive, HER-2 negative, G1, T1c, 1 4 cm, stage I invasive mammary carcinoma in the left breast    Oncotype score was 10  BRCA test was negative  Since February 2017 she has been on tamoxifen  She received radiation  She has noticed some thinning of the hair on tamoxifen  Hot flashes  Mood swings  Anxiety Patient is premenopausal   History thinning of hair, mood swings and anxiety  She had pain in the coccyx at last visit  She did not go for x-ray because pain went away  She states sometimes pain comes back  She will go for x-ray this time  History of heartburn for the last 1 month  She will try Zantac and is being referred to GI service  She states her menstrual periods are scanty and going away   Keny Merle Physical examination and test results are as recorded and discussed   She remains in remission from breast cancer  She will continue to take tamoxifen  She will have x-ray of her coccyx area  We discussed self breast examination, eating healthy foods and exercises as tolerated  Also health screening tests  Patient is being referred to GI service for heartburn as above  She goes to her breast surgeon for examination of breasts and imaging studies  She voiced understanding and agreement with above discussion  She is aware to contact us with questions or symptoms in the interim  We'll see her in follow-up in 3 months    1  Malignant neoplasm of upper-outer quadrant of left breast in female, estrogen receptor positive (HCC)    - XR sacrum and coccyx; Future  - Cancer antigen 27 29  - CBC and differential  - Comprehensive metabolic panel    2  Coccyx pain    - XR sacrum and coccyx; Future    3  Heartburn    - Ambulatory referral to Gastroenterology; Future        Patient voiced understanding and agreement in the discussion  Counseling / Coordination of Care   Greater than 50% of total time was spent with the patient and / or family counseling and / or coordination of care

## 2019-01-07 NOTE — PROGRESS NOTES
HPI:    In January 2017 patient had lumpectomy and sentinel lymph node sampling for hormone receptor positive, HER-2 negative, G1, T1c, 1 4 cm, stage I invasive mammary carcinoma in the left breast    Oncotype score was 10  BRCA test was negative  Since February 2017 she has been on tamoxifen  She received radiation  She has noticed some thinning of the hair on tamoxifen  Hot flashes  Mood swings  Anxiety Patient is premenopausal   History thinning of hair, mood swings and anxiety  She had pain in the coccyx at last visit  She did not go for x-ray because pain went away  She states sometimes pain comes back  She will go for x-ray this time  History of heartburn for the last 1 month  She will try Zantac and is being referred to GI service  She states her menstrual periods are scanty and going away                  Current Outpatient Prescriptions:     ALPRAZolam (XANAX) 0 25 mg tablet, Take 0 25 mg by mouth daily as needed for anxiety, Disp: , Rfl:     Calcium 500 MG tablet, Take 1 tablet by mouth daily, Disp: , Rfl:     Carboxymethylcellulose Sodium (EYE DROPS OP), Apply to eye, Disp: , Rfl:     COLLAGEN PO, Take 1 tablet by mouth daily, Disp: , Rfl:     fexofenadine (ALLEGRA) 180 MG tablet, Take 180 mg by mouth, Disp: , Rfl:     Ibuprofen 200 MG CAPS, Take by mouth as needed, Disp: , Rfl:     MILK THISTLE PO, Take 1 tablet by mouth daily, Disp: , Rfl:     Multiple Vitamins-Minerals (MULTIVITAMIN ADULT PO), Take 1 tablet by mouth daily, Disp: , Rfl:     tamoxifen (NOLVADEX) 20 mg tablet, Take 1 tablet by mouth daily, Disp: , Rfl:     Epinastine HCl 0 05 % ophthalmic solution, INSTILL 1 DROP INTO BOTH EYES TWICE A DAY, Disp: , Rfl:     loteprednol etabonate (LOTEMAX) 0 5 % ophth gel, INSTILL 1 DROP INTO BOTH EYES 3 TIMES A DAY, Disp: , Rfl:     Allergies   Allergen Reactions    Other Other (See Comments)     Scallops-pt states she has swelling and vomiting  Also seasonal        Oncology History Last seen in follow up on 5/7/18 7/2/18 Dr Anna Childs  On Tamoxifen  discussed making her postmenopausal now either by ovarian suppression or BSO  She would prefer to go into postmenopausal status naturally especially when her periods are getting less frequent  8/13/18 Bilateral diagnostic mammogram   Left breast upper-outer quadrant postsurgical scarring and distortion has expected postoperative appearance  There are no suspicious masses, grouped microcalcifications or   unexplained areas of architectural distortion in either breast   IMPRESSION  Benign findings as above      ACR BI-RADS® Assessments: BiRad:2 - Benign    10/25/18 Apolinar Morales MD for contour deformity to her left upper outer quadrant  Fat grafting discussed  12/10/18 Dr Braulio Abraham  Breast asymmetry and seeing Dr Estefania Mir to discuss fat grafting  Breast MRI scheduled due to dense breast tissue    1/7/19 Dr Anna Childs    2/1/19 Bilateral breast MRI ordered     6/10/19 Dr Braulio Abraham          Malignant neoplasm of upper-outer quadrant of left breast in female, estrogen receptor positive ([facility] [address] )    11/2016 Initial Diagnosis     Malignant neoplasm of upper-outer quadrant of left breast in female, estrogen receptor positive ([facility] [address] )         11/17/2016 Biopsy     Left breast biopsy: invasive and in situ ductal carcinoma          1/3/2017 Surgery     Left lumpectomy and sentinel node biopsy  Final staging: Stage IA IDC, strongly ER/OK +, HER2-         2017 Genetic Testing     Oncotype score was 10  BRCA test was negative  2/2017 -  Hormone Therapy     Tamoxifen          3/13/2017 - 4/24/2017 Radiation     left breast to a dose of 5000 cGy followed by an additional 1000 cGy to the primary site            ROS:  01/07/19 Reviewed 13 systems:  Presently no other neurological, cardiac, pulmonary, GI and  symptoms other than listed above    No other symptoms like   fever, chills, bleeding, bone pains, skin rash, weight loss, arthritic symptoms, tiredness , weakness, numbness,  claudication and gait problem  No frequent infections  Not unusually sensitive to heat or cold  No swelling of the ankles  No swollen glands  Patient is anxious  Other symptoms are in HPI        /60 (BP Location: Right arm, Patient Position: Sitting, Cuff Size: Adult)   Pulse 72   Temp 98 7 °F (37 1 °C)   Resp 18   Ht 5' 7" (1 702 m)   Wt 55 4 kg (122 lb 3 2 oz)   SpO2 97%   BMI 19 14 kg/m²     Physical Exam:  My nurse Nallely Corrales was with me in the room during examination    Patient is alert and oriented  She is not in distress  Blood pressure is low normal   No icterus  No oral thrush  There is no palpable neck mass  Lung fields are clear to percussion and auscultation  Heart rate is regular  Abdomen is soft  It is slightly tender in the epigastric area  No   palpable abdominal mass, no ascites, no edema of ankles, no calf tenderness, no focal neurological deficit, no skin rash, no palpable lymphadenopathy in the neck and axillary areas, good arterial pulses, no clubbing  Patient is anxious  Performance status 0  Patient goes to her breast surgeon for examination and imaging studies      IMAGING:      LABS:  Results for orders placed or performed in visit on 01/07/19   Comprehensive metabolic panel   Result Value Ref Range    Sodium 140 136 - 145 mmol/L    Potassium 4 0 3 5 - 5 3 mmol/L    Chloride 106 100 - 108 mmol/L    CO2 29 21 - 32 mmol/L    ANION GAP 5 4 - 13 mmol/L    BUN 16 5 - 25 mg/dL    Creatinine 0 60 0 60 - 1 30 mg/dL    Glucose, Fasting 87 65 - 99 mg/dL    Calcium 8 7 8 3 - 10 1 mg/dL    AST 23 5 - 45 U/L    ALT 29 12 - 78 U/L    Alkaline Phosphatase 51 46 - 116 U/L    Total Protein 7 2 6 4 - 8 2 g/dL    Albumin 3 6 3 5 - 5 0 g/dL    Total Bilirubin 0 45 0 20 - 1 00 mg/dL    eGFR 106 ml/min/1 73sq m     Labs, Imaging, & Other studies:   All pertinent labs and imaging studies were personally reviewed    Lab Results   Component Value Date    K 4 0 01/07/2019  01/07/2019    CO2 29 01/07/2019    BUN 16 01/07/2019    CREATININE 0 60 01/07/2019    GLUF 87 01/07/2019    CALCIUM 8 7 01/07/2019    AST 23 01/07/2019    ALT 29 01/07/2019    ALKPHOS 51 01/07/2019    EGFR 106 01/07/2019     Lab Results   Component Value Date    WBC 5 70 01/07/2019    HGB 12 2 01/07/2019    HCT 38 5 01/07/2019    MCV 94 01/07/2019     01/07/2019    Normal differential count  Tumor marker is pending    Reviewed and discussed with patient  Assessment and plan: In January 2017 patient had lumpectomy and sentinel lymph node sampling for hormone receptor positive, HER-2 negative, G1, T1c, 1 4 cm, stage I invasive mammary carcinoma in the left breast    Oncotype score was 10  BRCA test was negative  Since February 2017 she has been on tamoxifen  She received radiation  She has noticed some thinning of the hair on tamoxifen  Hot flashes  Mood swings  Anxiety Patient is premenopausal   History thinning of hair, mood swings and anxiety  She had pain in the coccyx at last visit  She did not go for x-ray because pain went away  She states sometimes pain comes back  She will go for x-ray this time  History of heartburn for the last 1 month  She will try Zantac and is being referred to GI service  She states her menstrual periods are scanty and going away   Mindy Christianson Physical examination and test results are as recorded and discussed  She remains in remission from breast cancer  She will continue to take tamoxifen  She will have x-ray of her coccyx area  We discussed self breast examination, eating healthy foods and exercises as tolerated  Also health screening tests  Patient is being referred to GI service for heartburn as above  She goes to her breast surgeon for examination of breasts and imaging studies  She voiced understanding and agreement with above discussion  She is aware to contact us with questions or symptoms in the interim   We'll see her in follow-up in 3 months    1  Malignant neoplasm of upper-outer quadrant of left breast in female, estrogen receptor positive (HCC)    - XR sacrum and coccyx; Future  - Cancer antigen 27 29  - CBC and differential  - Comprehensive metabolic panel    2  Coccyx pain    - XR sacrum and coccyx; Future    3  Heartburn    - Ambulatory referral to Gastroenterology; Future        Patient voiced understanding and agreement in the discussion  Counseling / Coordination of Care   Greater than 50% of total time was spent with the patient and / or family counseling and / or coordination of care

## 2019-01-08 ENCOUNTER — TELEPHONE (OUTPATIENT)
Dept: HEMATOLOGY ONCOLOGY | Facility: CLINIC | Age: 54
End: 2019-01-08

## 2019-01-08 LAB — CANCER AG27-29 SERPL-ACNC: 15.2 U/ML (ref 0–42.3)

## 2019-01-08 NOTE — TELEPHONE ENCOUNTER
Called patient and informed her that her CA 27 29 result has not been resulted yet  I informed her that once we get the result we will contact her

## 2019-01-09 NOTE — TELEPHONE ENCOUNTER
LVM informing patient that her CA 27 9 is within normal range and she is fine, Per Dr Alvarado Santoyo  I instructed her to contact our office if she has any additional questions

## 2019-01-20 NOTE — PROGRESS NOTES
Assessment/Plan:     Follow up with pelvic floor PT  RTO 5/19 for annual visit  Diagnoses and all orders for this visit:    Pelvic floor relaxation  -     Ambulatory referral to Physical Therapy; Future              Subjective:      Patient ID: Shagufta Dinero is a 48 y o  female  Shagufta Dinero is a 48 y o  female who is here today for a problem visit  States that she feels like her uterus has dropped x 1 month  Denies pelvic pain, no urinary or bowel concerns  Taking tamoxifen as directed and has 3 more years  No vaginal bleeding  Last menses 11/18  No monthly menses  Is typically every 3 months  Exercises 4 time per week  Shagufta Dinero is sexually active with male partner of 24 years  No sexual complaints  The following portions of the patient's history were reviewed and updated as appropriate: allergies, current medications, past family history, past medical history, past social history, past surgical history and problem list     Review of Systems   Constitutional: Negative  Respiratory: Negative  Negative for chest tightness and shortness of breath  Cardiovascular: Negative  Gastrointestinal: Negative for abdominal distention, constipation, diarrhea, nausea and vomiting  Genitourinary: Positive for menstrual problem  Negative for difficulty urinating, dyspareunia, dysuria, frequency, pelvic pain, urgency, vaginal bleeding, vaginal discharge and vaginal pain  Neurological: Negative for weakness and headaches  Hematological: Negative for adenopathy  Psychiatric/Behavioral: Negative  Objective:      /60 (BP Location: Right arm, Patient Position: Sitting)   Pulse 78   Ht 5' 7" (1 702 m)   Wt 56 2 kg (123 lb 12 8 oz)   LMP 11/26/2018   BMI 19 39 kg/m²          Physical Exam   Constitutional: She is oriented to person, place, and time  She appears well-developed and well-nourished     Genitourinary: Vagina normal and uterus normal  Rectal exam shows no external hemorrhoid  No labial fusion  There is no rash, tenderness, lesion or injury on the right labia  There is no rash, tenderness, lesion or injury on the left labia  Cervix exhibits no motion tenderness, no discharge and no friability  Right adnexum displays no mass, no tenderness and no fullness  Left adnexum displays no mass, no tenderness and no fullness  Genitourinary Comments: First degree uterine prolapse   Musculoskeletal: Normal range of motion  Lymphadenopathy:        Right: No inguinal adenopathy present  Left: No inguinal adenopathy present  Neurological: She is alert and oriented to person, place, and time  Skin: Skin is warm and dry  Psychiatric: She has a normal mood and affect  Nursing note and vitals reviewed

## 2019-01-21 ENCOUNTER — OFFICE VISIT (OUTPATIENT)
Dept: GYNECOLOGY | Facility: CLINIC | Age: 54
End: 2019-01-21
Payer: COMMERCIAL

## 2019-01-21 VITALS
BODY MASS INDEX: 19.43 KG/M2 | DIASTOLIC BLOOD PRESSURE: 60 MMHG | HEART RATE: 78 BPM | SYSTOLIC BLOOD PRESSURE: 112 MMHG | HEIGHT: 67 IN | WEIGHT: 123.8 LBS

## 2019-01-21 DIAGNOSIS — N81.89 PELVIC FLOOR RELAXATION: Primary | ICD-10-CM

## 2019-01-21 PROCEDURE — 99213 OFFICE O/P EST LOW 20 MIN: CPT | Performed by: NURSE PRACTITIONER

## 2019-01-30 NOTE — PROGRESS NOTES
PT Evaluation     Today's date: 2019  Patient name: Boyd Hayward  : 1965  MRN: [id number]  Referring provider: LUCIANA Delgadillo  Dx: No diagnosis found  Assessment  Assessment details: Boyd Hayward is a 48 y o  female who presents with concerns of ***  Patient presents with the above outlined deficits and is appropriate for skilled physical therapy in order to address deficits and ultimately meet goal of independent self management of condition  Therapeutic activities performed upon examination included education regarding pelvic floor anatomy, explanation of exam technique, explanation of exam findings and discussion of treatment plan as well as expectations of the patient to emphasize the importance of compliance and adherence to physical therapy visits  Goals  STGs to be met in *** weeks:  * Patient will be compliant with introductory HEP as prescribed  * Patient presents with good understanding of pelvic floor protective strategies to reduce intra-abdominal pressure against pelvic organs  * Patient will demonstrate and report good postural techniques with toileting  LTGs to be met by discharge:  * Patient will present with a  ***% improvement on FOTO score by discharge to indicate improved symptom management  * Normalize sEMG findings to indicate strength average > 12uV and resting average < 2 0uV  * Demonstrates correct isolation and relaxation of pelvic floor to palpation without overflow from global stabilizers  * Patient will use pelvic floor muscles correctly during functional ADLs such as coughing, sneezing, lifting and exercise activities to avoid excessive IAP and PFM strain  * Patient will be compliant with comprehensive home exercise program for self management of condition         Plan  Patient would benefit from: skilled physical therapy  Referral necessary: No  Frequency: 1x week  Duration in weeks: 12  Plan of Care beginning date: 1/31/2019  Plan of Care expiration date: 4/25/2019  Treatment plan discussed with: patient      PT Women's Health Subjective:     Patient Goals: Other patient goals:  STGs to be met in *** weeks:  * Patient will be compliant with introductory HEP as prescribed  * Patient presents with good understanding of pelvic floor protective strategies to reduce intra-abdominal pressure against pelvic organs  * Patient will demonstrate and report good postural techniques with toileting  LTGs to be met by discharge:  * Patient will present with a  ***% improvement on FOTO score by discharge to indicate improved symptom management  * Normalize sEMG findings to indicate strength average > 12uV and resting average < 2 0uV  * Demonstrates correct isolation and relaxation of pelvic floor to palpation without overflow from global stabilizers  * Patient will use pelvic floor muscles correctly during functional ADLs such as coughing, sneezing, lifting and exercise activities to avoid excessive IAP and PFM strain  * Patient will be compliant with comprehensive home exercise program for self management of condition         Subjective    Objective        Precautions: h/o breast CA, R LQ pain    Daily Treatment Diary     Manual                                                                                   Exercise Diary                                                                                                                                                                                                                                                                                      Modalities

## 2019-01-31 ENCOUNTER — APPOINTMENT (OUTPATIENT)
Dept: PHYSICAL THERAPY | Facility: REHABILITATION | Age: 54
End: 2019-01-31
Payer: COMMERCIAL

## 2019-02-07 ENCOUNTER — HOSPITAL ENCOUNTER (OUTPATIENT)
Dept: RADIOLOGY | Facility: HOSPITAL | Age: 54
Discharge: HOME | End: 2019-02-07
Attending: SURGERY
Payer: COMMERCIAL

## 2019-02-07 DIAGNOSIS — Z17.0 MALIGNANT NEOPLASM OF UPPER-OUTER QUADRANT OF LEFT BREAST IN FEMALE, ESTROGEN RECEPTOR POSITIVE (HCC): ICD-10-CM

## 2019-02-07 DIAGNOSIS — R92.2 INCONCLUSIVE MAMMOGRAM DUE TO DENSE BREASTS: ICD-10-CM

## 2019-02-07 DIAGNOSIS — C50.412 MALIGNANT NEOPLASM OF UPPER-OUTER QUADRANT OF LEFT BREAST IN FEMALE, ESTROGEN RECEPTOR POSITIVE (HCC): ICD-10-CM

## 2019-02-07 PROCEDURE — C8908 MRI W/O FOL W/CONT, BREAST,: HCPCS

## 2019-02-07 PROCEDURE — 77049 MRI BREAST C-+ W/CAD BI: CPT

## 2019-02-10 NOTE — PROGRESS NOTES
PT Evaluation     Today's date: 2019  Patient name: Zhane Leonardo  : 1965  MRN: [id number]  Referring provider: LUCIANA Mittal  Dx:   Encounter Diagnosis     ICD-10-CM    1  Pelvic floor relaxation N81 89 Ambulatory referral to Physical Therapy                  Assessment  Assessment details: Zhane Leonardo is a 48 y o  female who presents with concerns of pelvic pressure and PFM weakness  Patient presents with the above outlined deficits and is appropriate for skilled physical therapy in order to address deficits and ultimately meet goal of independent self management of condition  Therapeutic activities performed upon examination included education regarding pelvic floor anatomy, explanation of exam technique, explanation of exam findings and discussion of treatment plan as well as expectations of the patient to emphasize the importance of compliance and adherence to physical therapy visits  Impairments: abnormal coordination, impaired physical strength and lacks appropriate home exercise program  Understanding of Dx/Px/POC: good   Prognosis: good  Prognosis details:       Goals  STGs to be met in 4 weeks:  * Patient will be compliant with introductory HEP as prescribed  * Patient presents with good understanding of pelvic floor protective strategies to reduce intra-abdominal pressure against pelvic organs  * Patient will demonstrate and report good postural techniques with toileting  LTGs to be met by discharge:   * Normalize sEMG findings to indicate strength average > 12uV and resting average < 2 0uV  * Demonstrates correct isolation and relaxation of pelvic floor to palpation without overflow from global stabilizers  * Patient will use pelvic floor muscles correctly during functional ADLs such as coughing, sneezing, lifting and exercise activities to avoid excessive IAP and PFM strain     * Patient will be compliant with comprehensive home exercise program for self management of condition  Plan  Patient would benefit from: skilled physical therapy  Referral necessary: No  Planned modality interventions: biofeedback  Planned therapy interventions: manual therapy, motor coordination training, neuromuscular re-education, patient education, strengthening, therapeutic activities, therapeutic exercise, home exercise program, behavior modification and breathing training  Frequency: 1x week  Duration in weeks: 12  Plan of Care beginning date: 2019  Plan of Care expiration date: 2019  Treatment plan discussed with: patient        Subjective Evaluation    History of Present Illness  Mechanism of injury: Patient reports that she has had a sense of vaginal pressure and a little bit of discharge/leakage that she notes when she attempts to do a kegel in the middle of urination  (side note - I asked her to stop this practice immediately as it is physiologically paradoxical   She expressed understanding as to why)  OB/GYN:  - vaginal deliveries, ages 27 and 24 yo, periods are irregular -  every 3 mos     H/O breast CA - lumpectomy and radiation, has bloodwork every 3-4 mos  She has been taking Tamoxifen intermittently because she is concerned it can change the lining of the uterus     Exercise: elliptical at home and goes to gym 4-5 days/week, at the gym she uses the elliptical and works core and legs with nautilus machines- she does not feel a sense of pressure when she exercises  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          Recurrent probem    Quality of life: excellent    Pain  No pain reported    Treatments  No previous or current treatments  Patient Goals  Patient goals for therapy: increased strength, independence with ADLs/IADLs and decreased pain          Objective   Pelvic Floor Exam   Position: supine exam    General Perineum Exam:   Positive for gaping introitus and no pelvic organ prolapse at rest  Visual Inspection of Perineum:   Excursion of perineal body in cephalad direction with contraction of pelvic floor muscles (PFM): delayed  and weak  Excursion of perineal body in caudal direction with relaxation of pelvic floor muscles (PFM): fair   Involuntary contraction with coughing: yes  Involuntary relaxation with bearing down: yes  PFM Contraction Comments: Posterior lift with active kegel, anal wink evident  Cotton swab test: non-tender  Sphincter Tone Resting: normal  Sphincter Tone Squeeze: normal  Sensation: intact    Pelvic Organ Prolapse   Position: hook-lying  At rest: none  With bearing down: moderate (to the level of hymenal remnants)  Location: midline vaginal    Pelvic Floor Muscle Exam       PERFECT Score   Power right: 2+/5  Power left: 2+/5  Endurance (seconds to max): 5  Repetitions (before fatigue): 5  Fast flicks (in 10 seconds): 4    SMEG Biofeedback   to be assessed next treatment            Precautions: h/o breast CA    Daily Treatment Diary     Manual                                                                                   Exercise Diary  2/11/19            NMR via biofeedback nv                         HEP: 5 sec hold:5 sec rest: 10 reps - in sitting, 4 sessions a day Issued handout                                                                                                                                                                                                                                             Modalities

## 2019-02-11 ENCOUNTER — EVALUATION (OUTPATIENT)
Dept: PHYSICAL THERAPY | Facility: REHABILITATION | Age: 54
End: 2019-02-11
Payer: COMMERCIAL

## 2019-02-11 ENCOUNTER — TELEPHONE (OUTPATIENT)
Dept: SURGICAL ONCOLOGY | Facility: CLINIC | Age: 54
End: 2019-02-11

## 2019-02-11 DIAGNOSIS — N81.89 PELVIC FLOOR RELAXATION: ICD-10-CM

## 2019-02-11 PROCEDURE — 97162 PT EVAL MOD COMPLEX 30 MIN: CPT | Performed by: PHYSICAL THERAPIST

## 2019-02-11 NOTE — TELEPHONE ENCOUNTER
Returned pt's call inquiring about her breast MRI results  Report was negative  Pt was informed, she verbalized understanding

## 2019-02-15 DIAGNOSIS — C50.412 MALIGNANT NEOPLASM OF UPPER-OUTER QUADRANT OF LEFT BREAST IN FEMALE, ESTROGEN RECEPTOR POSITIVE (HCC): Primary | ICD-10-CM

## 2019-02-15 DIAGNOSIS — Z17.0 MALIGNANT NEOPLASM OF UPPER-OUTER QUADRANT OF LEFT BREAST IN FEMALE, ESTROGEN RECEPTOR POSITIVE (HCC): Primary | ICD-10-CM

## 2019-02-15 RX ORDER — TAMOXIFEN CITRATE 20 MG/1
TABLET ORAL
Qty: 30 TABLET | Refills: 2 | Status: SHIPPED | OUTPATIENT
Start: 2019-02-15 | End: 2019-04-01 | Stop reason: SDUPTHER

## 2019-03-22 ENCOUNTER — APPOINTMENT (OUTPATIENT)
Dept: PHYSICAL THERAPY | Facility: REHABILITATION | Age: 54
End: 2019-03-22
Payer: COMMERCIAL

## 2019-03-28 NOTE — PROGRESS NOTES
Daily Note/ Discharge     Today's date: 3/29/2019  Patient name: Rosario Ramon  : 1965  MRN: [id number]  Referring provider: LUCIANA Valera  Dx:   Encounter Diagnosis     ICD-10-CM    1  Pelvic floor relaxation N81 89        From evaluation: Patient reports that she has had a sense of vaginal pressure and a little bit of discharge/leakage that she notes when she attempts to do a kegel in the middle of urination  (side note - I asked her to stop this practice immediately as it is physiologically paradoxical   She expressed understanding as to why)  OB/GYN:  - vaginal deliveries, ages 27 and 26 yo, periods are irregular -  every 3 mos     H/O breast CA - lumpectomy and radiation, has bloodwork every 3-4 mos  She has been taking Tamoxifen intermittently because she is concerned it can change the lining of the uterus  Exercise: elliptical at home and goes to gym 4-5 days/week, at the gym she uses the elliptical and works core and legs with nautilus machines- she does not feel a sense of pressure when she exercises  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               Subjective: Patient reports that she has been doing her exercises daily and feels as if she is improving  She has not been here in 7 weeks since IE due to schedule conflict and menses        Objective: See treatment diary below      Precautions: h/o breast CA     Daily Treatment Diary      Manual   3/29                      PFM assessment and cuing  x15'                                                                                                                           Exercise Diary  2/11/19  3/29                   NMR via biofeedback nv                                             HEP: 5 sec hold:5 sec rest: 10 reps - in sitting, 4 sessions a day Issued handout  x5'                                            quick flicks at end of endurance holds     x5'                                                                                                                                                                                                                                             TA x 10' to discuss toileting strategies since she tends to bear down and rush BMs  Recommended drinking a warm drink 5' prior to [facility] and to obtain a 7" squatty potty  Assessment: Tolerated treatment well  Patient presents with 4/5 PFM strength x 10 seconds and 11 quick flicks in 10 seconds indicating good fast twitch control  We discussed self management strategies and we both agree she can continue independently at this time       Plan: Discharge PT

## 2019-03-29 ENCOUNTER — OFFICE VISIT (OUTPATIENT)
Dept: PHYSICAL THERAPY | Facility: REHABILITATION | Age: 54
End: 2019-03-29
Payer: COMMERCIAL

## 2019-03-29 DIAGNOSIS — N81.89 PELVIC FLOOR RELAXATION: Primary | ICD-10-CM

## 2019-03-29 PROCEDURE — 97530 THERAPEUTIC ACTIVITIES: CPT | Performed by: PHYSICAL THERAPIST

## 2019-03-29 PROCEDURE — 97140 MANUAL THERAPY 1/> REGIONS: CPT | Performed by: PHYSICAL THERAPIST

## 2019-03-29 PROCEDURE — 97110 THERAPEUTIC EXERCISES: CPT | Performed by: PHYSICAL THERAPIST

## 2019-04-01 DIAGNOSIS — Z17.0 MALIGNANT NEOPLASM OF UPPER-OUTER QUADRANT OF LEFT BREAST IN FEMALE, ESTROGEN RECEPTOR POSITIVE (HCC): ICD-10-CM

## 2019-04-01 DIAGNOSIS — C50.412 MALIGNANT NEOPLASM OF UPPER-OUTER QUADRANT OF LEFT BREAST IN FEMALE, ESTROGEN RECEPTOR POSITIVE (HCC): ICD-10-CM

## 2019-04-01 RX ORDER — TAMOXIFEN CITRATE 20 MG/1
TABLET ORAL
Qty: 30 TABLET | Refills: 0 | Status: SHIPPED | OUTPATIENT
Start: 2019-04-01 | End: 2019-08-26 | Stop reason: SDUPTHER

## 2019-04-12 ENCOUNTER — TELEPHONE (OUTPATIENT)
Dept: HEMATOLOGY ONCOLOGY | Facility: HOSPITAL | Age: 54
End: 2019-04-12

## 2019-04-12 ENCOUNTER — APPOINTMENT (OUTPATIENT)
Dept: LAB | Facility: HOSPITAL | Age: 54
End: 2019-04-12
Attending: INTERNAL MEDICINE
Payer: COMMERCIAL

## 2019-04-12 LAB
ALBUMIN SERPL BCP-MCNC: 3.9 G/DL (ref 3.5–5)
ALP SERPL-CCNC: 60 U/L (ref 46–116)
ALT SERPL W P-5'-P-CCNC: 32 U/L (ref 12–78)
ANION GAP SERPL CALCULATED.3IONS-SCNC: 5 MMOL/L (ref 4–13)
AST SERPL W P-5'-P-CCNC: 25 U/L (ref 5–45)
BASOPHILS # BLD AUTO: 0.06 THOUSANDS/ΜL (ref 0–0.1)
BASOPHILS NFR BLD AUTO: 1 % (ref 0–1)
BILIRUB SERPL-MCNC: 0.3 MG/DL (ref 0.2–1)
BUN SERPL-MCNC: 26 MG/DL (ref 5–25)
CALCIUM SERPL-MCNC: 8.9 MG/DL (ref 8.3–10.1)
CANCER AG27-29 SERPL-ACNC: 18.8 U/ML (ref 0–42.3)
CHLORIDE SERPL-SCNC: 107 MMOL/L (ref 100–108)
CO2 SERPL-SCNC: 30 MMOL/L (ref 21–32)
CREAT SERPL-MCNC: 0.62 MG/DL (ref 0.6–1.3)
EOSINOPHIL # BLD AUTO: 0.11 THOUSAND/ΜL (ref 0–0.61)
EOSINOPHIL NFR BLD AUTO: 2 % (ref 0–6)
ERYTHROCYTE [DISTWIDTH] IN BLOOD BY AUTOMATED COUNT: 13.2 % (ref 11.6–15.1)
GFR SERPL CREATININE-BSD FRML MDRD: 105 ML/MIN/1.73SQ M
GLUCOSE SERPL-MCNC: 83 MG/DL (ref 65–140)
HCT VFR BLD AUTO: 39.6 % (ref 34.8–46.1)
HGB BLD-MCNC: 12.4 G/DL (ref 11.5–15.4)
IMM GRANULOCYTES # BLD AUTO: 0.03 THOUSAND/UL (ref 0–0.2)
IMM GRANULOCYTES NFR BLD AUTO: 0 % (ref 0–2)
LYMPHOCYTES # BLD AUTO: 1.69 THOUSANDS/ΜL (ref 0.6–4.47)
LYMPHOCYTES NFR BLD AUTO: 24 % (ref 14–44)
MCH RBC QN AUTO: 29.8 PG (ref 26.8–34.3)
MCHC RBC AUTO-ENTMCNC: 31.3 G/DL (ref 31.4–37.4)
MCV RBC AUTO: 95 FL (ref 82–98)
MONOCYTES # BLD AUTO: 0.81 THOUSAND/ΜL (ref 0.17–1.22)
MONOCYTES NFR BLD AUTO: 11 % (ref 4–12)
NEUTROPHILS # BLD AUTO: 4.49 THOUSANDS/ΜL (ref 1.85–7.62)
NEUTS SEG NFR BLD AUTO: 62 % (ref 43–75)
NRBC BLD AUTO-RTO: 0 /100 WBCS
PLATELET # BLD AUTO: 226 THOUSANDS/UL (ref 149–390)
PMV BLD AUTO: 11.9 FL (ref 8.9–12.7)
POTASSIUM SERPL-SCNC: 3.9 MMOL/L (ref 3.5–5.3)
PROT SERPL-MCNC: 7.2 G/DL (ref 6.4–8.2)
RBC # BLD AUTO: 4.16 MILLION/UL (ref 3.81–5.12)
SODIUM SERPL-SCNC: 142 MMOL/L (ref 136–145)
WBC # BLD AUTO: 7.19 THOUSAND/UL (ref 4.31–10.16)

## 2019-04-12 PROCEDURE — 80053 COMPREHEN METABOLIC PANEL: CPT | Performed by: INTERNAL MEDICINE

## 2019-04-12 PROCEDURE — 86300 IMMUNOASSAY TUMOR CA 15-3: CPT | Performed by: INTERNAL MEDICINE

## 2019-04-12 PROCEDURE — 36415 COLL VENOUS BLD VENIPUNCTURE: CPT | Performed by: INTERNAL MEDICINE

## 2019-04-12 PROCEDURE — 85025 COMPLETE CBC W/AUTO DIFF WBC: CPT | Performed by: INTERNAL MEDICINE

## 2019-04-15 ENCOUNTER — OFFICE VISIT (OUTPATIENT)
Dept: HEMATOLOGY ONCOLOGY | Facility: CLINIC | Age: 54
End: 2019-04-15
Payer: COMMERCIAL

## 2019-04-15 VITALS
DIASTOLIC BLOOD PRESSURE: 76 MMHG | WEIGHT: 123 LBS | HEIGHT: 67 IN | TEMPERATURE: 99.3 F | HEART RATE: 83 BPM | SYSTOLIC BLOOD PRESSURE: 104 MMHG | RESPIRATION RATE: 16 BRPM | BODY MASS INDEX: 19.3 KG/M2

## 2019-04-15 DIAGNOSIS — C50.412 MALIGNANT NEOPLASM OF UPPER-OUTER QUADRANT OF LEFT BREAST IN FEMALE, ESTROGEN RECEPTOR POSITIVE (HCC): Primary | ICD-10-CM

## 2019-04-15 DIAGNOSIS — Z17.0 MALIGNANT NEOPLASM OF UPPER-OUTER QUADRANT OF LEFT BREAST IN FEMALE, ESTROGEN RECEPTOR POSITIVE (HCC): Primary | ICD-10-CM

## 2019-04-15 PROCEDURE — 99214 OFFICE O/P EST MOD 30 MIN: CPT | Performed by: PHYSICIAN ASSISTANT

## 2019-04-29 ENCOUNTER — TELEPHONE (OUTPATIENT)
Dept: ADMINISTRATIVE | Facility: HOSPITAL | Age: 54
End: 2019-04-29

## 2019-05-07 ENCOUNTER — OFFICE VISIT (OUTPATIENT)
Dept: VASCULAR SURGERY | Facility: CLINIC | Age: 54
End: 2019-05-07
Payer: COMMERCIAL

## 2019-05-07 VITALS
HEART RATE: 64 BPM | RESPIRATION RATE: 18 BRPM | SYSTOLIC BLOOD PRESSURE: 102 MMHG | WEIGHT: 124 LBS | BODY MASS INDEX: 19.46 KG/M2 | DIASTOLIC BLOOD PRESSURE: 66 MMHG | HEIGHT: 67 IN | TEMPERATURE: 98.6 F

## 2019-05-07 DIAGNOSIS — I83.811 VARICOSE VEINS OF RIGHT LOWER EXTREMITY WITH PAIN: Primary | ICD-10-CM

## 2019-05-07 PROCEDURE — 99213 OFFICE O/P EST LOW 20 MIN: CPT | Performed by: NURSE PRACTITIONER

## 2019-05-08 ENCOUNTER — TELEPHONE (OUTPATIENT)
Dept: ADMINISTRATIVE | Facility: HOSPITAL | Age: 54
End: 2019-05-08

## 2019-06-03 ENCOUNTER — ANNUAL EXAM (OUTPATIENT)
Dept: GYNECOLOGY | Facility: CLINIC | Age: 54
End: 2019-06-03
Payer: COMMERCIAL

## 2019-06-03 VITALS
SYSTOLIC BLOOD PRESSURE: 132 MMHG | DIASTOLIC BLOOD PRESSURE: 66 MMHG | HEIGHT: 67 IN | HEART RATE: 80 BPM | BODY MASS INDEX: 19.37 KG/M2 | WEIGHT: 123.4 LBS

## 2019-06-03 DIAGNOSIS — R14.0 BLOATING SYMPTOM: ICD-10-CM

## 2019-06-03 DIAGNOSIS — Z01.411 ENCNTR FOR GYN EXAM (GENERAL) (ROUTINE) W ABNORMAL FINDINGS: Primary | ICD-10-CM

## 2019-06-03 DIAGNOSIS — N81.89 VAGINAL RELAXATION: ICD-10-CM

## 2019-06-03 PROCEDURE — S0612 ANNUAL GYNECOLOGICAL EXAMINA: HCPCS | Performed by: NURSE PRACTITIONER

## 2019-06-06 ENCOUNTER — TELEPHONE (OUTPATIENT)
Dept: HEMATOLOGY ONCOLOGY | Facility: CLINIC | Age: 54
End: 2019-06-06

## 2019-06-10 ENCOUNTER — OFFICE VISIT (OUTPATIENT)
Dept: SURGICAL ONCOLOGY | Facility: CLINIC | Age: 54
End: 2019-06-10
Payer: COMMERCIAL

## 2019-06-10 VITALS
HEART RATE: 84 BPM | WEIGHT: 124 LBS | DIASTOLIC BLOOD PRESSURE: 62 MMHG | SYSTOLIC BLOOD PRESSURE: 100 MMHG | BODY MASS INDEX: 19.46 KG/M2 | RESPIRATION RATE: 16 BRPM | HEIGHT: 67 IN | TEMPERATURE: 98.7 F

## 2019-06-10 DIAGNOSIS — R92.2 INCONCLUSIVE MAMMOGRAM DUE TO DENSE BREASTS: ICD-10-CM

## 2019-06-10 DIAGNOSIS — Z17.0 MALIGNANT NEOPLASM OF UPPER-OUTER QUADRANT OF LEFT BREAST IN FEMALE, ESTROGEN RECEPTOR POSITIVE (HCC): Primary | ICD-10-CM

## 2019-06-10 DIAGNOSIS — C50.412 MALIGNANT NEOPLASM OF UPPER-OUTER QUADRANT OF LEFT BREAST IN FEMALE, ESTROGEN RECEPTOR POSITIVE (HCC): Primary | ICD-10-CM

## 2019-06-10 DIAGNOSIS — Z79.810 USE OF TAMOXIFEN (NOLVADEX): ICD-10-CM

## 2019-06-10 PROCEDURE — 99214 OFFICE O/P EST MOD 30 MIN: CPT | Performed by: SURGERY

## 2019-07-19 ENCOUNTER — TELEPHONE (OUTPATIENT)
Dept: HEMATOLOGY ONCOLOGY | Facility: CLINIC | Age: 54
End: 2019-07-19

## 2019-07-19 NOTE — TELEPHONE ENCOUNTER
Called and LVM informing patient that her labs need to be completed prior to her appt on 7/22/19 at 9:00 am with Dr Lizz Rahman at the [place] location  Instructed patient to call our office if she cannot have her labs completed to R/S her appt

## 2019-07-22 ENCOUNTER — TELEPHONE (OUTPATIENT)
Dept: HEMATOLOGY ONCOLOGY | Facility: CLINIC | Age: 54
End: 2019-07-22

## 2019-07-22 NOTE — TELEPHONE ENCOUNTER
Pt called and indicated she didn't have labs drawn for appt on 7/22 so appt was rs to [place] on Monday, 8/26 since this was the first Monday appt    Pt unable to do afternoon due to working

## 2019-08-19 ENCOUNTER — HOSPITAL ENCOUNTER (OUTPATIENT)
Dept: MAMMOGRAPHY | Facility: CLINIC | Age: 54
Discharge: HOME | End: 2019-08-19
Payer: COMMERCIAL

## 2019-08-19 VITALS — BODY MASS INDEX: 19.93 KG/M2 | WEIGHT: 124 LBS | HEIGHT: 66 IN

## 2019-08-19 DIAGNOSIS — Z17.0 MALIGNANT NEOPLASM OF UPPER-OUTER QUADRANT OF LEFT BREAST IN FEMALE, ESTROGEN RECEPTOR POSITIVE (HCC): ICD-10-CM

## 2019-08-19 DIAGNOSIS — C50.412 MALIGNANT NEOPLASM OF UPPER-OUTER QUADRANT OF LEFT BREAST IN FEMALE, ESTROGEN RECEPTOR POSITIVE (HCC): ICD-10-CM

## 2019-08-19 PROCEDURE — G0279 TOMOSYNTHESIS, MAMMO: HCPCS

## 2019-08-19 PROCEDURE — 77066 DX MAMMO INCL CAD BI: CPT

## 2019-08-21 ENCOUNTER — APPOINTMENT (OUTPATIENT)
Dept: LAB | Facility: HOSPITAL | Age: 54
End: 2019-08-21
Payer: COMMERCIAL

## 2019-08-21 LAB
ALBUMIN SERPL BCP-MCNC: 4.2 G/DL (ref 3.5–5)
ALP SERPL-CCNC: 64 U/L (ref 46–116)
ALT SERPL W P-5'-P-CCNC: 30 U/L (ref 12–78)
ANION GAP SERPL CALCULATED.3IONS-SCNC: 8 MMOL/L (ref 4–13)
AST SERPL W P-5'-P-CCNC: 22 U/L (ref 5–45)
BASOPHILS # BLD AUTO: 0.06 THOUSANDS/ΜL (ref 0–0.1)
BASOPHILS NFR BLD AUTO: 1 % (ref 0–1)
BILIRUB SERPL-MCNC: 0.38 MG/DL (ref 0.2–1)
BUN SERPL-MCNC: 25 MG/DL (ref 5–25)
CALCIUM SERPL-MCNC: 9.5 MG/DL (ref 8.3–10.1)
CANCER AG27-29 SERPL-ACNC: 16.3 U/ML (ref 0–42.3)
CHLORIDE SERPL-SCNC: 105 MMOL/L (ref 100–108)
CO2 SERPL-SCNC: 28 MMOL/L (ref 21–32)
CREAT SERPL-MCNC: 0.68 MG/DL (ref 0.6–1.3)
EOSINOPHIL # BLD AUTO: 0.14 THOUSAND/ΜL (ref 0–0.61)
EOSINOPHIL NFR BLD AUTO: 2 % (ref 0–6)
ERYTHROCYTE [DISTWIDTH] IN BLOOD BY AUTOMATED COUNT: 13 % (ref 11.6–15.1)
GFR SERPL CREATININE-BSD FRML MDRD: 101 ML/MIN/1.73SQ M
GLUCOSE SERPL-MCNC: 92 MG/DL (ref 65–140)
HCT VFR BLD AUTO: 40.5 % (ref 34.8–46.1)
HGB BLD-MCNC: 13 G/DL (ref 11.5–15.4)
IMM GRANULOCYTES # BLD AUTO: 0.03 THOUSAND/UL (ref 0–0.2)
IMM GRANULOCYTES NFR BLD AUTO: 0 % (ref 0–2)
LYMPHOCYTES # BLD AUTO: 2.04 THOUSANDS/ΜL (ref 0.6–4.47)
LYMPHOCYTES NFR BLD AUTO: 25 % (ref 14–44)
MCH RBC QN AUTO: 30.2 PG (ref 26.8–34.3)
MCHC RBC AUTO-ENTMCNC: 32.1 G/DL (ref 31.4–37.4)
MCV RBC AUTO: 94 FL (ref 82–98)
MONOCYTES # BLD AUTO: 0.77 THOUSAND/ΜL (ref 0.17–1.22)
MONOCYTES NFR BLD AUTO: 10 % (ref 4–12)
NEUTROPHILS # BLD AUTO: 5.09 THOUSANDS/ΜL (ref 1.85–7.62)
NEUTS SEG NFR BLD AUTO: 62 % (ref 43–75)
NRBC BLD AUTO-RTO: 0 /100 WBCS
PLATELET # BLD AUTO: 241 THOUSANDS/UL (ref 149–390)
PMV BLD AUTO: 11 FL (ref 8.9–12.7)
POTASSIUM SERPL-SCNC: 4.1 MMOL/L (ref 3.5–5.3)
PROT SERPL-MCNC: 7.7 G/DL (ref 6.4–8.2)
RBC # BLD AUTO: 4.31 MILLION/UL (ref 3.81–5.12)
SODIUM SERPL-SCNC: 141 MMOL/L (ref 136–145)
WBC # BLD AUTO: 8.13 THOUSAND/UL (ref 4.31–10.16)

## 2019-08-21 PROCEDURE — 85025 COMPLETE CBC W/AUTO DIFF WBC: CPT | Performed by: PHYSICIAN ASSISTANT

## 2019-08-21 PROCEDURE — 86300 IMMUNOASSAY TUMOR CA 15-3: CPT | Performed by: PHYSICIAN ASSISTANT

## 2019-08-21 PROCEDURE — 36415 COLL VENOUS BLD VENIPUNCTURE: CPT | Performed by: PHYSICIAN ASSISTANT

## 2019-08-21 PROCEDURE — 80053 COMPREHEN METABOLIC PANEL: CPT | Performed by: PHYSICIAN ASSISTANT

## 2019-08-23 ENCOUNTER — TELEPHONE (OUTPATIENT)
Dept: HEMATOLOGY ONCOLOGY | Facility: CLINIC | Age: 54
End: 2019-08-23

## 2019-08-23 NOTE — TELEPHONE ENCOUNTER
Kady I see notes in regards to rescheduling of patient appt to 8/26 but possible issues with patient coming in  Patient is requesting "blood work" however should this be reviewed with Nata Cerda at an appt?

## 2019-08-23 NOTE — TELEPHONE ENCOUNTER
MILTON informing patient that she has an appt on 8/26/19 and ARTI Cage will review her results with her at that time  I informed patient if she would like to discuss it further she can call our office

## 2019-08-26 ENCOUNTER — OFFICE VISIT (OUTPATIENT)
Dept: HEMATOLOGY ONCOLOGY | Facility: CLINIC | Age: 54
End: 2019-08-26
Payer: COMMERCIAL

## 2019-08-26 VITALS
HEIGHT: 66 IN | OXYGEN SATURATION: 99 % | HEART RATE: 73 BPM | DIASTOLIC BLOOD PRESSURE: 68 MMHG | RESPIRATION RATE: 16 BRPM | TEMPERATURE: 98.9 F | SYSTOLIC BLOOD PRESSURE: 132 MMHG | BODY MASS INDEX: 19.93 KG/M2 | WEIGHT: 124 LBS

## 2019-08-26 DIAGNOSIS — C50.412 MALIGNANT NEOPLASM OF UPPER-OUTER QUADRANT OF LEFT BREAST IN FEMALE, ESTROGEN RECEPTOR POSITIVE (HCC): Primary | ICD-10-CM

## 2019-08-26 DIAGNOSIS — Z79.810 LONG-TERM CURRENT USE OF TAMOXIFEN: ICD-10-CM

## 2019-08-26 DIAGNOSIS — Z17.0 MALIGNANT NEOPLASM OF UPPER-OUTER QUADRANT OF LEFT BREAST IN FEMALE, ESTROGEN RECEPTOR POSITIVE (HCC): Primary | ICD-10-CM

## 2019-08-26 PROCEDURE — 99214 OFFICE O/P EST MOD 30 MIN: CPT | Performed by: PHYSICIAN ASSISTANT

## 2019-08-26 RX ORDER — TAMOXIFEN CITRATE 20 MG/1
20 TABLET ORAL DAILY
Qty: 90 TABLET | Refills: 2 | Status: SHIPPED | OUTPATIENT
Start: 2019-08-26 | End: 2022-04-19

## 2019-08-26 NOTE — PROGRESS NOTES
Hematology/Oncology Outpatient Follow-up  Maryam Jain 48 y o  female 1965 [id number]    Date:  8/26/2019      Assessment and Plan:  1  Malignant neoplasm of upper-outer quadrant of left breast in female, estrogen receptor positive [facility]  27-year-old female presents for follow-up regarding history of breast cancer  She remains on adjuvant therapy with tamoxifen  At her last visit 6 months ago she had stated that she was not always compliant with her tamoxifen  She states that she is much better now with compliance with tamoxifen  She does admit to missing a few doses here and there  Discussed continued tamoxifen compliance  Additionally advised to take aspirin 81 mg p o  Daily with food  She is taking vitamin-D 2000 International Units per day  GYN and Surgical Oncology appointments are up-to-date  Imaging is also up-to-date  CBC, CMP, CA 27  29 are all unremarkable  Refill provided  Follow-up in 6 months with repeat labs  - CBC and differential; Future  - Comprehensive metabolic panel; Future  - Cancer antigen 27 29; Future  - tamoxifen (NOLVADEX) 20 mg tablet; Take 1 tablet (20 mg total) by mouth daily  Dispense: 90 tablet; Refill: 2    2  Long-term current use of tamoxifen  DEXA scan due to being on tamoxifen  She is taking vitamin-D and exercise regularly  - DXA bone density spine hip and pelvis; Future    HPI:     Malignant neoplasm of upper-outer quadrant of left breast in female, estrogen receptor positive ([facility] [address] )    11/2016 Initial Diagnosis     Malignant neoplasm of upper-outer quadrant of left breast in female, estrogen receptor positive ([facility] [address] )      11/17/2016 Biopsy     Left breast biopsy: invasive and in situ ductal carcinoma       1/3/2017 Surgery     Left lumpectomy and sentinel node biopsy  Final staging: Stage IA IDC, strongly ER/LA +, HER2-      2017 Genetic Testing     Oncotype score was 10  BRCA test was negative        2/2017 -  Hormone Therapy     Tamoxifen 3/13/2017 - 4/24/2017 Radiation     left breast to a dose of 5000 cGy followed by an additional 1000 cGy to the primary site     61-year-old female presents for follow-up regarding history of breast cancer  In January 2017 she underwent lumpectomy and sentinel lymph node sampling for hormone receptor positive, HER2 negative, G1 T1c 1 4 cm, stage I invasive mammary carcinoma of the left breast   Oncotype score was 10  BRCA testing was negative  In February 2017 she was started on tamoxifen      She underwent radiation therapy from March to April 2017  Interval history: Periods are irregular  Most recently went 2 months without period and now period     ROS: Review of Systems   Constitutional: Negative for appetite change, chills, fatigue, fever and unexpected weight change  HENT: Positive for sinus pressure  Negative for mouth sores and nosebleeds  Respiratory: Negative for cough and shortness of breath  Cardiovascular: Negative for chest pain, palpitations and leg swelling  Gastrointestinal: Negative for abdominal pain, blood in stool, constipation, diarrhea, nausea and vomiting  Genitourinary: Negative for difficulty urinating and dysuria  Musculoskeletal: Negative for arthralgias  Skin: Negative  Neurological: Negative for dizziness, light-headedness, numbness and headaches  Hematological: Negative  Psychiatric/Behavioral: Negative          Past Medical History:   Diagnosis Date    Abdominal pain, left lower quadrant     Anxiety     BRCA negative     Common cold     Environmental and seasonal allergies     Exercises 5 to 6 times per week     Food allergy     scallops    Inconclusive mammogram due to dense breasts     Low back pain     Malignant neoplasm of upper-outer quadrant of left breast in female, estrogen receptor positive ([facility] [address] ) 2017    Seasonal allergies     Use of tamoxifen (Nolvadex)     Vaginal discharge     Vaginal irritation     Vulvar burning     Vulvar irritation        Past Surgical History:   Procedure Laterality Date    APPENDECTOMY      BREAST BIOPSY Left     COLONOSCOPY      MAMMO NEEDLE LOCALIZATION LEFT (ALL INC) Left 1/3/2017    PA BIOPSY/EXCISION, LYMPH NODE(S) Left 1/3/2017    Procedure: BIOPSY LYMPH NODE SENTINEL @ [address]  Surgeon: Fallon Higgins MD;  [facility];  Service: Surgical Oncology    PA MASTECTOMY, PARTIAL Left 1/3/2017    Procedure: LUMPECTOMY BREAST NEEDLE LOCALIZED @ 0930;  Surgeon: Fallon Higgins MD;  [facility];  Service: Surgical Oncology    SENTINEL LYMPH NODE BIOPSY      US GUIDED BREAST BIOPSY LEFT COMPLETE Left 2016       Social History     Socioeconomic History    Marital status: /Civil Union     Spouse name: None    Number of children: 2    Years of education: None    Highest education level: None   Occupational History    Occupation:    Social Needs    Financial resource strain: None    Food insecurity:     Worry: None     Inability: None    Transportation needs:     Medical: None     Non-medical: None   Tobacco Use    Smoking status: Former Smoker     Types: Cigarettes     Last attempt to quit:      Years since quittin 6    Smokeless tobacco: Never Used   Substance and Sexual Activity    Alcohol use: Yes     Comment: occasional     Drug use: No    Sexual activity: Yes     Partners: Male     Birth control/protection: None   Lifestyle    Physical activity:     Days per week: None     Minutes per session: None    Stress: None   Relationships    Social connections:     Talks on phone: None     Gets together: None     Attends Pentecostalism service: None     Active member of club or organization: None     Attends meetings of clubs or organizations: None     Relationship status: None    Intimate partner violence:     Fear of current or ex partner: None     Emotionally abused: None     Physically abused: None     Forced sexual activity: None   Other Topics Concern    None   Social History Narrative    Exercises 5-6 times per week    Full time employment    Lives with spouse    No caffeine use    2 children ages 25 and 21       Family History   Problem Relation Age of Onset    Lung cancer Mother         mother age 61    Lung cancer Father         father age 66    Breast cancer Sister         age dx unk    Breast cancer Paternal Aunt         aunt 50    No Known Problems Brother     No Known Problems Daughter     No Known Problems Daughter     No Known Problems Maternal Aunt     No Known Problems Maternal Aunt        Allergies   Allergen Reactions    Other Other (See Comments)     Scallops-pt states she has swelling and vomiting  Also seasonal          Current Outpatient Medications:     ALPRAZolam (XANAX) 0 25 mg tablet, Take 0 25 mg by mouth daily as needed for anxiety, Disp: , Rfl:     Calcium 500 MG tablet, Take 1 tablet by mouth daily, Disp: , Rfl:     COLLAGEN PO, Take 1 tablet by mouth daily, Disp: , Rfl:     fexofenadine (ALLEGRA) 180 MG tablet, Take 180 mg by mouth, Disp: , Rfl:     Ibuprofen 200 MG CAPS, Take by mouth as needed, Disp: , Rfl:     MILK THISTLE PO, Take 1 tablet by mouth daily, Disp: , Rfl:     Multiple Vitamins-Minerals (MULTIVITAMIN ADULT PO), Take 1 tablet by mouth daily, Disp: , Rfl:     Probiotic Product (PROBIOTIC-10 PO), Take by mouth, Disp: , Rfl:     tamoxifen (NOLVADEX) 20 mg tablet, TAKE 1 TABLET DAILY  , Disp: 30 tablet, Rfl: 0    methylPREDNISolone 4 MG tablet therapy pack, Use as directed on package (Patient not taking: Reported on 8/26/2019), Disp: 1 each, Rfl: 0      Physical Exam:  /68 (BP Location: Left arm)   Pulse 73   Temp 98 9 °F (37 2 °C) (Oral)   Resp 16   Ht 5' 6" (1 676 m)   Wt 56 2 kg (124 lb)   LMP 07/28/2019 (Approximate)   SpO2 99%   BMI 20 01 kg/m²     Physical Exam   Constitutional: She is oriented to person, place, and time   She appears well-developed and well-nourished  No distress  HENT:   Head: Normocephalic and atraumatic  Eyes: Conjunctivae are normal  No scleral icterus  Neck: Normal range of motion  Neck supple  Cardiovascular: Normal rate, regular rhythm and normal heart sounds  No murmur heard  Pulmonary/Chest: Effort normal and breath sounds normal  No respiratory distress  Abdominal: Soft  There is no tenderness  Musculoskeletal: Normal range of motion  She exhibits no edema or tenderness  Lymphadenopathy:        Head (right side): No submandibular, no posterior auricular and no occipital adenopathy present  Head (left side): No submandibular, no posterior auricular and no occipital adenopathy present  She has no cervical adenopathy  She has no axillary adenopathy  Right: No supraclavicular adenopathy present  Left: No supraclavicular adenopathy present  Neurological: She is alert and oriented to person, place, and time  No cranial nerve deficit  Skin: Skin is warm and dry  Psychiatric: She has a normal mood and affect  Labs:  Lab Results   Component Value Date    WBC 8 13 08/21/2019    HGB 13 0 08/21/2019    HCT 40 5 08/21/2019    MCV 94 08/21/2019     08/21/2019     Lab Results   Component Value Date    K 4 1 08/21/2019     08/21/2019    CO2 28 08/21/2019    BUN 25 08/21/2019    CREATININE 0 68 08/21/2019    GLUF 87 01/07/2019    CALCIUM 9 5 08/21/2019    AST 22 08/21/2019    ALT 30 08/21/2019    ALKPHOS 64 08/21/2019    EGFR 101 08/21/2019     Patient voiced understanding and agreement in the above discussion  Aware to contact our office with questions/symptoms in the interim

## 2019-12-12 ENCOUNTER — CLINICAL SUPPORT (OUTPATIENT)
Dept: VASCULAR SURGERY | Facility: CLINIC | Age: 54
End: 2019-12-12

## 2019-12-12 VITALS
TEMPERATURE: 98.9 F | HEIGHT: 66 IN | DIASTOLIC BLOOD PRESSURE: 64 MMHG | WEIGHT: 126 LBS | SYSTOLIC BLOOD PRESSURE: 106 MMHG | HEART RATE: 82 BPM | BODY MASS INDEX: 20.25 KG/M2

## 2019-12-12 DIAGNOSIS — I83.811 VARICOSE VEINS OF RIGHT LOWER EXTREMITY WITH PAIN: Primary | ICD-10-CM

## 2019-12-12 PROCEDURE — 36468 NJX SCLRSNT SPIDER VEINS: CPT | Performed by: NURSE PRACTITIONER

## 2019-12-12 NOTE — PROGRESS NOTES
Procedures       SCLEROTHERAPY PROCEDURE NOTE -  VASCULAR      Assessment/Plan:   47 yo F w/ hx of L breast cancer s/p resection and radiation; currently maintained on tamoxifen, prior history of RLE sclerotherapy '11 by Dr Steven Olea who returns for injection sclero therapy of R thigh vein  -She was previously evaluated with reflux study which showed competent deep and superficial systems; GSV is absent; there is an anterior saphenous branch which is competent  -She was recommended for injection sclerotherapy  -Asclera 1% 4ml injected to right medial distal thigh and knee; held compression and then applied compression socks  -Stockings to be worn for the next 48 hours and then daily  -Follow up in the office in 4 weeks to recheck injection site       Problem List Items Addressed This Visit        Cardiovascular and Mediastinum    Varicose veins of right lower extremity with pain - Primary            Subjective:      Patient ID: Roshan Irwin is a 48 y o  female      Indications for Procedure: Patient presents with right thigh dilated reticular veins and telangiectasia on the legs  Objective      Exam: On bilateral lower extremities, dilated reticular veins and telangiectasias are present symmetrically without findings of chronic venous insufficiency  Superficial prominent veins  Patient advised or risks of pain upon injection, redness and swelling after treatment, bruising and bleeding, necrosis/ulceration, allergy, discoloration and the likelihood that multiple treatments may be necessary and clearance may not be complete  Affected areas on the right medial distal thigh/knee were treated with intravascular injections of 4 cc of  1% Asclera using a 32G syringe per s protocol  The patient tolerated the procedure well with minimal erythema and edema  Immediate pressure was applied with cotton balls secured with tape at the injection sites   Compression stockings of 20-30mm Hg were applied to the treated legs  The patient was advised to wear compression stockings during the day for the next 4 weeks    Follow up in 4 weeks

## 2019-12-12 NOTE — PATIENT INSTRUCTIONS
Leave compression dressing in place for 48 hours then can be removed and you may shower  After that wear compression stockings daily for the next 4 weeks until I see you back    Any issues or concerns please notify the office

## 2019-12-12 NOTE — PROGRESS NOTES
Assessment/Plan:         [facility]}      Subjective:      Patient ID: Kyle Martinez is a 48 y o  female  Patient presents in office for L leg scelero injections  Patient reports pain in the L leg  Patient denies any wounds or ulcers  Patient reports wearing compression socks occasionally  Patient is an occasional smoker  HPI    {Common ambulatory SmartLinks:69809}    Review of Systems   Constitutional: Negative  HENT: Negative  Eyes: Negative  Respiratory: Negative  Cardiovascular: Negative  Gastrointestinal: Negative  Endocrine: Negative  Genitourinary: Negative  Musculoskeletal: Negative  L leg pain   Skin: Negative  Negative for wound  Allergic/Immunologic: Negative  Neurological: Negative  Hematological: Negative  Psychiatric/Behavioral: Negative  Objective: There were no vitals taken for this visit           Physical Exam

## 2019-12-24 ENCOUNTER — TELEPHONE (OUTPATIENT)
Dept: RADIATION ONCOLOGY | Facility: HOSPITAL | Age: 54
End: 2019-12-24

## 2020-01-06 ENCOUNTER — OFFICE VISIT (OUTPATIENT)
Dept: SURGICAL ONCOLOGY | Facility: CLINIC | Age: 55
End: 2020-01-06
Payer: COMMERCIAL

## 2020-01-06 VITALS
TEMPERATURE: 98.7 F | BODY MASS INDEX: 19.93 KG/M2 | HEART RATE: 81 BPM | HEIGHT: 66 IN | DIASTOLIC BLOOD PRESSURE: 80 MMHG | WEIGHT: 124 LBS | SYSTOLIC BLOOD PRESSURE: 120 MMHG | RESPIRATION RATE: 18 BRPM

## 2020-01-06 DIAGNOSIS — R92.2 INCONCLUSIVE MAMMOGRAM DUE TO DENSE BREASTS: Primary | ICD-10-CM

## 2020-01-06 DIAGNOSIS — C50.412 MALIGNANT NEOPLASM OF UPPER-OUTER QUADRANT OF LEFT BREAST IN FEMALE, ESTROGEN RECEPTOR POSITIVE (HCC): ICD-10-CM

## 2020-01-06 DIAGNOSIS — Z17.0 MALIGNANT NEOPLASM OF UPPER-OUTER QUADRANT OF LEFT BREAST IN FEMALE, ESTROGEN RECEPTOR POSITIVE (HCC): ICD-10-CM

## 2020-01-06 DIAGNOSIS — Z79.810 USE OF TAMOXIFEN (NOLVADEX): ICD-10-CM

## 2020-01-06 PROCEDURE — 99214 OFFICE O/P EST MOD 30 MIN: CPT | Performed by: SURGERY

## 2020-01-06 NOTE — PROGRESS NOTES
Surgical Oncology Follow Up       [facility]  [facility] SURGICAL ONCOLOGY [facility] [address]    Billie Iyer  1965  [id number]  [address]  [facility] SURGICAL ONCOLOGY [place]  [facility] [address]    Chief Complaint   Patient presents with    Follow-up       Assessment/Plan   Diagnoses and all orders for this visit:    Inconclusive mammogram due to dense breasts  -     MRI breast bilateral w and wo contrast w cad; Future    Malignant neoplasm of upper-outer quadrant of left breast in female, estrogen receptor positive ([facility] [address] )  -     MRI breast bilateral w and wo contrast w cad; Future  -     Mammo diagnostic bilateral w 3d & cad; Future    Use of tamoxifen (Nolvadex)        Advance Care Planning/Advance Directives:  Discussed disease status, cancer treatment plans and/or cancer treatment goals with the patient  Oncology History:       Malignant neoplasm of upper-outer quadrant of left breast in female, estrogen receptor positive ([facility] [address] )    11/2016 Initial Diagnosis     Malignant neoplasm of upper-outer quadrant of left breast in female, estrogen receptor positive ([facility] [address] )      11/17/2016 Biopsy     Left breast biopsy: invasive and in situ ductal carcinoma       1/3/2017 Surgery     Left lumpectomy and sentinel node biopsy  Final staging: Stage IA IDC, strongly ER/AL +, HER2-      2017 Genetic Testing     Oncotype score was 10  BRCA test was negative  2/2017 -  Hormone Therapy     Tamoxifen       3/13/2017 - 4/24/2017 Radiation     left breast to a dose of 5000 cGy followed by an additional 1000 cGy to the primary site         History of Present Illness: breast cancer follow up, reports tolerating tamoxifen, other concerns per ROS  -Interval History:mammogram    Review of Systems:  Review of Systems   Constitutional: Negative  Negative for appetite change and fever  Eyes: Negative      Respiratory: Negative for shortness of breath  Cardiovascular: Negative  Gastrointestinal: Negative  Endocrine: Negative  Genitourinary: Negative  Musculoskeletal: Positive for myalgias (left axilla)  Negative for arthralgias  Skin: Negative  Allergic/Immunologic: Negative  Neurological: Positive for numbness (right hand)  Hematological: Negative  Negative for adenopathy  Does not bruise/bleed easily  Psychiatric/Behavioral: Negative          Patient Active Problem List   Diagnosis    Malignant neoplasm of upper-outer quadrant of left breast in female, estrogen receptor positive ([address] )    Right lower quadrant pain    Rectal pain    External hemorrhoid    Narrowing of stools    Vaginal discharge    Encntr for gyn exam (general) (routine) w abnormal findings    Vaginal relaxation    Routine screening for STI (sexually transmitted infection)    Use of tamoxifen (Nolvadex)    Inconclusive mammogram due to dense breasts    Varicose veins of right lower extremity with pain    Coccyx pain    Heartburn    Pelvic floor relaxation    Bloating symptom     Past Medical History:   Diagnosis Date    Abdominal pain, left lower quadrant     Anxiety     BRCA negative     Common cold     Environmental and seasonal allergies     Exercises 5 to 6 times per week     Food allergy     scallops    Inconclusive mammogram due to dense breasts     Low back pain     Malignant neoplasm of upper-outer quadrant of left breast in female, estrogen receptor positive ([facility] [address] ) 2017    Seasonal allergies     Use of tamoxifen (Nolvadex)     Vaginal discharge     Vaginal irritation     Vulvar burning     Vulvar irritation      Past Surgical History:   Procedure Laterality Date    APPENDECTOMY      BREAST BIOPSY Left     COLONOSCOPY      MAMMO NEEDLE LOCALIZATION LEFT (ALL INC) Left 1/3/2017    NM BIOPSY/EXCISION, LYMPH NODE(S) Left 1/3/2017    Procedure: BIOPSY LYMPH NODE SENTINEL @ [address]; Surgeon: Nba Jaffe MD;  [facility];  Service: Surgical Oncology    [place] MASTECTOMY, PARTIAL Left 1/3/2017    Procedure: LUMPECTOMY BREAST NEEDLE LOCALIZED @ 0930;  Surgeon: Nba Jaffe MD;  [facility];  Service: Surgical Oncology    SENTINEL LYMPH NODE BIOPSY      US GUIDED BREAST BIOPSY LEFT COMPLETE Left 11/17/2016     Family History   Problem Relation Age of Onset    Lung cancer Mother         mother age 61    Lung cancer Father         father age 66    Breast cancer Sister         age dx unk    Breast cancer Paternal Aunt         aunt 50    No Known Problems Brother     No Known Problems Daughter     No Known Problems Daughter     No Known Problems Maternal Aunt     No Known Problems Maternal Aunt      Social History     Socioeconomic History    Marital status: /Civil Union     Spouse name: Not on file    Number of children: 2    Years of education: Not on file    Highest education level: Not on file   Occupational History    Occupation:    Social Needs    Financial resource strain: Not on file    Food insecurity:     Worry: Not on file     Inability: Not on file    Transportation needs:     Medical: Not on file     Non-medical: Not on file   Tobacco Use    Smoking status: Current Some Day Smoker     Types: Cigarettes    Smokeless tobacco: Never Used   Substance and Sexual Activity    Alcohol use: Yes     Comment: occasional     Drug use: No    Sexual activity: Yes     Partners: Male     Birth control/protection: None   Lifestyle    Physical activity:     Days per week: Not on file     Minutes per session: Not on file    Stress: Not on file   Relationships    Social connections:     Talks on phone: Not on file     Gets together: Not on file     Attends Mandaen service: Not on file     Active member of club or organization: Not on file     Attends meetings of clubs or organizations: Not on file     Relationship status: Not on file   Annette Bey Intimate partner violence:     Fear of current or ex partner: Not on file     Emotionally abused: Not on file     Physically abused: Not on file     Forced sexual activity: Not on file   Other Topics Concern    Not on file   Social History Narrative    Exercises 5-6 times per week    Full time employment    Lives with spouse    No caffeine use    2 children ages 25 and 21       Current Outpatient Medications:     ALPRAZolam (XANAX) 0 25 mg tablet, Take 0 25 mg by mouth daily as needed for anxiety, Disp: , Rfl:     Calcium 500 MG tablet, Take 1 tablet by mouth daily, Disp: , Rfl:     COLLAGEN PO, Take 1 tablet by mouth daily, Disp: , Rfl:     fexofenadine (ALLEGRA) 180 MG tablet, Take 180 mg by mouth, Disp: , Rfl:     Ibuprofen 200 MG CAPS, Take by mouth as needed, Disp: , Rfl:     MILK THISTLE PO, Take 1 tablet by mouth daily, Disp: , Rfl:     Multiple Vitamins-Minerals (MULTIVITAMIN ADULT PO), Take 1 tablet by mouth daily, Disp: , Rfl:     Probiotic Product (PROBIOTIC-10 PO), Take by mouth, Disp: , Rfl:     tamoxifen (NOLVADEX) 20 mg tablet, Take 1 tablet (20 mg total) by mouth daily, Disp: 90 tablet, Rfl: 2  Allergies   Allergen Reactions    Other Other (See Comments)     Scallops-pt states she has swelling and vomiting  Also seasonal        The following portions of the patient's history were reviewed and updated as appropriate: allergies, current medications, past family history, past medical history, past social history, past surgical history and problem list         Vitals:    01/06/20 0844   BP: 120/80   Pulse: 81   Resp: 18   Temp: 98 7 °F (37 1 °C)       Physical Exam   Constitutional: She is oriented to person, place, and time  She appears well-developed and well-nourished  HENT:   Head: Normocephalic and atraumatic  Cardiovascular: Normal heart sounds     Pulmonary/Chest: Breath sounds normal  Right breast exhibits no inverted nipple, no mass, no nipple discharge, no skin change and no tenderness  Left breast exhibits skin change (lumpectomy scar)  Left breast exhibits no inverted nipple, no mass, no nipple discharge and no tenderness  Tightness at the left pectoralis   Abdominal: Soft  Lymphadenopathy:        Right axillary: No pectoral and no lateral adenopathy present  Left axillary: No pectoral and no lateral adenopathy present  Right: No supraclavicular adenopathy present  Left: No supraclavicular adenopathy present  Neurological: She is alert and oriented to person, place, and time  Psychiatric: She has a normal mood and affect  Results:  Labs:      Imaging  08/19/2019 bilateral 3D diagnostic mammogram is benign BI-RADS two with a density of three    I reviewed the above imaging data  Discussion/Summary:  24-year-old female status post left breast conservation for a stage I invasive ductal carcinoma  She had whole breast radiation and continues on tamoxifen  There is no evidence of disease based on examination today or most recent mammogram   I think the numbness and tingling in the right hand her unrelated to her diagnoses or medical treatment  She may have carpal tunnel or another etiology and should seek evaluation of this  The left sided pulling in the axilla corresponds to tightness at the pectoralis edge  I advised her to do massage and strengthening/range of motion exercises  I will make arrangements for her February MRI and August mammogram   I will see her again following the mammogram or sooner should the need arise

## 2020-01-20 ENCOUNTER — OFFICE VISIT (OUTPATIENT)
Dept: VASCULAR SURGERY | Facility: CLINIC | Age: 55
End: 2020-01-20

## 2020-01-20 VITALS
HEIGHT: 66 IN | DIASTOLIC BLOOD PRESSURE: 78 MMHG | BODY MASS INDEX: 20.41 KG/M2 | WEIGHT: 127 LBS | SYSTOLIC BLOOD PRESSURE: 110 MMHG | RESPIRATION RATE: 18 BRPM | HEART RATE: 92 BPM

## 2020-01-20 DIAGNOSIS — I83.811 VARICOSE VEINS OF RIGHT LOWER EXTREMITY WITH PAIN: Primary | ICD-10-CM

## 2020-01-20 PROCEDURE — 99024 POSTOP FOLLOW-UP VISIT: CPT | Performed by: NURSE PRACTITIONER

## 2020-01-20 NOTE — PROGRESS NOTES
Assessment/Plan:  47 yo F w/ hx of L breast cancer s/p resection and radiation; currently maintained on tamoxifen, prior history of RLE sclerotherapy '11 by Dr Esther Serna, now w/ R thigh reticular/truncal vein s/p injection sclerotherapy 12/12/19 who returns to the office in follow up after injection sclerotherapy  -Minimal response to injection sclerotherapy likely due to caliber of vein  At knee vein appears smaller   -She would like to attempt another session  -Additional sclero may not be effective   -If she would like to have an additional session - would need 90 Minutes; Aslera 1%; Foam technique; $250  -Rx 20-30 compression given  Current pair with run  -She will call to schedule  Problem List Items Addressed This Visit        Cardiovascular and Mediastinum    Varicose veins of right lower extremity with pain - Primary    Relevant Orders    Compression Stocking            Subjective:      Patient ID: Rodolfo Casarez is a 48 y o  female  Patient is s/p Aclera injections on the Rt thigh  Pt denies any pain, swelling, or skin discoloration  Pt states that the spider veins got a little better  Pt continues to wear compression daily  Pt is a social smoker  HPI    47 yo F w/ hx of L breast cancer s/p resection and radiation; currently maintained on tamoxifen, prior history of RLE sclerotherapy '11 by Dr Esther Serna, now w/ R thigh reticular/truncal vein s/p injection sclerotherapy 12/12/19 who returns to the office in follow up after injection sclerotherapy   She was compliant with wearing compression stockings  She notes improvement at knee vein, she previously called a "knob" is not as prominent  We compared before photos and not much improvement is noted  She would like to consider additional injections which we discussed may not yield much of an improvement       The following portions of the patient's history were reviewed and updated as appropriate: allergies, current medications, past family history, past medical history, past social history, past surgical history and problem list   Review of systems reviewed     Review of Systems   Constitutional: Negative  HENT: Negative  Eyes: Negative  Respiratory: Negative  Cardiovascular: Negative  Gastrointestinal: Negative  Endocrine: Negative  Genitourinary: Negative  Musculoskeletal: Negative  Skin: Negative  Allergic/Immunologic: Negative  Neurological: Negative  Hematological: Negative  Psychiatric/Behavioral: Negative  Objective:    I have reviewed and made appropriate changes to the review of systems input by the medical assistant      Vitals:    01/20/20 1112   BP: 110/78   BP Location: Left arm   Patient Position: Sitting   Pulse: 92   Resp: 18   Weight: 57 6 kg (127 lb)   Height: 5' 6" (1 676 m)       Patient Active Problem List   Diagnosis    Malignant neoplasm of upper-outer quadrant of left breast in female, estrogen receptor positive ([address] )    Right lower quadrant pain    Rectal pain    External hemorrhoid    Narrowing of stools    Vaginal discharge    Encntr for gyn exam (general) (routine) w abnormal findings    Vaginal relaxation    Routine screening for STI (sexually transmitted infection)    Use of tamoxifen (Nolvadex)    Inconclusive mammogram due to dense breasts    Varicose veins of right lower extremity with pain    Coccyx pain    Heartburn    Pelvic floor relaxation    Bloating symptom       Past Surgical History:   Procedure Laterality Date    APPENDECTOMY      BREAST BIOPSY Left     COLONOSCOPY      MAMMO NEEDLE LOCALIZATION LEFT (ALL INC) Left 1/3/2017    [place] BIOPSY/EXCISION, LYMPH NODE(S) Left 1/3/2017    Procedure: BIOPSY LYMPH NODE SENTINEL @ [address];  Surgeon: Joesph Lanier MD;  [facility];  Service: Surgical Oncology    IL MASTECTOMY, PARTIAL Left 1/3/2017    Procedure: LUMPECTOMY BREAST NEEDLE LOCALIZED @ 0930;  Surgeon: Judy Garnica Ivan Chery MD;  Location: [facility];  Service: Surgical Oncology    SENTINEL LYMPH NODE BIOPSY      US GUIDED BREAST BIOPSY LEFT COMPLETE Left 11/17/2016       Family History   Problem Relation Age of Onset    Lung cancer Mother         mother age 59    Lung cancer Father         father age 66    Breast cancer Sister         age dx unk    Breast cancer Paternal Aunt         aunt 50    No Known Problems Brother     No Known Problems Daughter     No Known Problems Daughter     No Known Problems Maternal Aunt     No Known Problems Maternal Aunt        Social History     Socioeconomic History    Marital status: /Civil Union     Spouse name: Not on file    Number of children: 2    Years of education: Not on file    Highest education level: Not on file   Occupational History    Occupation:    Social Needs    Financial resource strain: Not on file    Food insecurity:     Worry: Not on file     Inability: Not on file    Transportation needs:     Medical: Not on file     Non-medical: Not on file   Tobacco Use    Smoking status: Current Some Day Smoker     Types: Cigarettes    Smokeless tobacco: Never Used   Substance and Sexual Activity    Alcohol use: Yes     Comment: occasional     Drug use: No    Sexual activity: Yes     Partners: Male     Birth control/protection: None   Lifestyle    Physical activity:     Days per week: Not on file     Minutes per session: Not on file    Stress: Not on file   Relationships    Social connections:     Talks on phone: Not on file     Gets together: Not on file     Attends Shinto service: Not on file     Active member of club or organization: Not on file     Attends meetings of clubs or organizations: Not on file     Relationship status: Not on file    Intimate partner violence:     Fear of current or ex partner: Not on file     Emotionally abused: Not on file     Physically abused: Not on file     Forced sexual activity: Not on file Other Topics Concern    Not on file   Social History Narrative    Exercises 5-6 times per week    Full time employment    Lives with spouse    No caffeine use    2 children ages 25 and 21       Allergies   Allergen Reactions    Other Other (See Comments)     Scallops-pt states she has swelling and vomiting  Also seasonal          Current Outpatient Medications:     ALPRAZolam (XANAX) 0 25 mg tablet, Take 0 25 mg by mouth daily as needed for anxiety, Disp: , Rfl:     Calcium 500 MG tablet, Take 1 tablet by mouth daily, Disp: , Rfl:     COLLAGEN PO, Take 1 tablet by mouth daily, Disp: , Rfl:     fexofenadine (ALLEGRA) 180 MG tablet, Take 180 mg by mouth, Disp: , Rfl:     Ibuprofen 200 MG CAPS, Take by mouth as needed, Disp: , Rfl:     MILK THISTLE PO, Take 1 tablet by mouth daily, Disp: , Rfl:     Multiple Vitamins-Minerals (MULTIVITAMIN ADULT PO), Take 1 tablet by mouth daily, Disp: , Rfl:     Probiotic Product (PROBIOTIC-10 PO), Take by mouth, Disp: , Rfl:     tamoxifen (NOLVADEX) 20 mg tablet, Take 1 tablet (20 mg total) by mouth daily, Disp: 90 tablet, Rfl: 2    /78 (BP Location: Left arm, Patient Position: Sitting)   Pulse 92   Resp 18   Ht 5' 6" (1 676 m)   Wt 57 6 kg (127 lb)   BMI 20 50 kg/m²          Physical Exam    Right thigh truncal vein   See clincal image 5/17/19

## 2020-01-27 ENCOUNTER — CLINICAL SUPPORT (OUTPATIENT)
Dept: RADIATION ONCOLOGY | Facility: CLINIC | Age: 55
End: 2020-01-27
Attending: RADIOLOGY
Payer: COMMERCIAL

## 2020-01-27 VITALS
SYSTOLIC BLOOD PRESSURE: 122 MMHG | HEIGHT: 66 IN | WEIGHT: 126.54 LBS | BODY MASS INDEX: 20.34 KG/M2 | DIASTOLIC BLOOD PRESSURE: 78 MMHG | OXYGEN SATURATION: 99 % | TEMPERATURE: 98.3 F | RESPIRATION RATE: 16 BRPM | HEART RATE: 82 BPM

## 2020-01-27 DIAGNOSIS — C50.412 MALIGNANT NEOPLASM OF UPPER-OUTER QUADRANT OF LEFT BREAST IN FEMALE, ESTROGEN RECEPTOR POSITIVE (HCC): Primary | ICD-10-CM

## 2020-01-27 DIAGNOSIS — Z17.0 MALIGNANT NEOPLASM OF UPPER-OUTER QUADRANT OF LEFT BREAST IN FEMALE, ESTROGEN RECEPTOR POSITIVE (HCC): Primary | ICD-10-CM

## 2020-01-27 PROCEDURE — 99211 OFF/OP EST MAY X REQ PHY/QHP: CPT | Performed by: RADIOLOGY

## 2020-01-27 NOTE — PROGRESS NOTES
Follow-up - Radiation Oncology   Marcello Roberts 1965 48 y o  female [id number]      History of Present Illness   Cancer Staging  Malignant neoplasm of upper-outer quadrant of left breast in female, estrogen receptor positive ([facility] [address] )  Staging form: Breast, AJCC 7th Edition  - Clinical: Stage IA (T1c, N0, M0) - Signed by Dante Lazaro MD on 5/7/2018  Laterality: Left  Histologic grade (G): G1  Estrogen receptor status: Positive  Progesterone receptor status: Positive  HER2 status: Negative  - Pathologic: Stage IA (T1c, N0, cM0) - Signed by Joesph Lanier MD on 6/4/2018  Laterality: Left  Method of lymph node assessment: [place] lymph node biopsy  Histologic grade (G): G1  Estrogen receptor status: Positive  Percentage of positive estrogen receptors (%): 100  Progesterone receptor status: Positive  Percentage of positive progesterone receptors (%): 100  HER2 status: Negative  Multi-gene signature score: 10          Interval History:  Christal Jacobo is F 70 year old female who is nearly 3 years post adjuvant radiation therapy for Stage IA  left breast carcinoma   She was last seen 1/7/19  She remains on tamoxifen     2/2019 had bilateral breast MRI: findings benign      She had a Med Onc f/u in April, and discussed not taking her Tamoxifen regularly  When she wouldn't feel well, she'd stop taking, then resume again  Her periods had stopped when she started Tamoxifen, then early 2019, the periods returned  She w agreed to take the Tamoxifen, regularly, then would see med onc to see if her periods had again stopped  She had GYN appt in June  She told Gyn, she has been having more regular periods, lasting 5 days       F/U in June 2019, with surgeonCALIXTO      8/19/19 bilateral mammogram was benign      8/26/10 had f/u with med onc, stated she's been more compliant with Tamoxifen      1/6/2020 had f/u with Dr Raquel DE LUNA  HAS tightness of the left axilla, and was advised to do massage and strengthening/ROM        Today, she has no breast complaints  She continues to take Tamoxifen daily, no side effects reported          2/11/2020   MRI breasts  3/2/2020  F/U with med onc  8/24/2020  Mammogram  9/14/2020  F/U with Dr Fabiano Sterling      Malignant neoplasm of upper-outer quadrant of left breast in female, estrogen receptor positive ([facility] [address] )    11/2016 Initial Diagnosis     Malignant neoplasm of upper-outer quadrant of left breast in female, estrogen receptor positive ([facility] [address] )      11/17/2016 Biopsy     Left breast biopsy: invasive and in situ ductal carcinoma       1/3/2017 Surgery     Left lumpectomy and sentinel node biopsy  Final staging: Stage IA IDC, strongly ER/MO +, HER2-      2017 Genetic Testing     Oncotype score was 10  BRCA test was negative        2/2017 -  Hormone Therapy     Tamoxifen       3/13/2017 - 4/24/2017 Radiation     left breast to a dose of 5000 cGy followed by an additional 1000 cGy to the primary site         Past Medical History:   Diagnosis Date    Abdominal pain, left lower quadrant     Anxiety     BRCA negative     Common cold     Environmental and seasonal allergies     Exercises 5 to 6 times per week     Food allergy     scallops    Inconclusive mammogram due to dense breasts     Low back pain     Malignant neoplasm of upper-outer quadrant of left breast in female, estrogen receptor positive ([facility] [address] ) 2017    Seasonal allergies     Use of tamoxifen (Nolvadex)     Vaginal discharge     Vaginal irritation     Vulvar burning     Vulvar irritation      Past Surgical History:   Procedure Laterality Date    APPENDECTOMY      BREAST BIOPSY Left     COLONOSCOPY      MAMMO NEEDLE LOCALIZATION LEFT (ALL INC) Left 1/3/2017    MO BIOPSY/EXCISION, LYMPH NODE(S) Left 1/3/2017    Pro[facility]: [facility];  Surgeon: Denise Morris MD;  [facility];  Service: Surgical Oncology    MO MASTECTOMY, PARTIAL Left 1/3/2017 Procedure: LUMPECTOMY BREAST NEEDLE LOCALIZED @ 0930;  Surgeon: Sudhir Jang MD;  [facility];  Service: Surgical Oncology    SENTINEL LYMPH NODE BIOPSY      US GUIDED BREAST BIOPSY LEFT COMPLETE Left 11/17/2016       Social History   Social History     Substance and Sexual Activity   Alcohol Use Yes    Comment: occasional      Social History     Substance and Sexual Activity   Drug Use No     Social History     Tobacco Use   Smoking Status Current Some Day Smoker    Types: Cigarettes   Smokeless Tobacco Never Used         Meds/Allergies     Current Outpatient Medications:     ALPRAZolam (XANAX) 0 25 mg tablet, Take 0 25 mg by mouth daily as needed for anxiety, Disp: , Rfl:     Calcium 500 MG tablet, Take 1 tablet by mouth daily, Disp: , Rfl:     COLLAGEN PO, Take 1 tablet by mouth daily, Disp: , Rfl:     fexofenadine (ALLEGRA) 180 MG tablet, Take 180 mg by mouth, Disp: , Rfl:     Ibuprofen 200 MG CAPS, Take by mouth as needed, Disp: , Rfl:     MILK THISTLE PO, Take 1 tablet by mouth daily, Disp: , Rfl:     Multiple Vitamins-Minerals (MULTIVITAMIN ADULT PO), Take 1 tablet by mouth daily, Disp: , Rfl:     Probiotic Product (PROBIOTIC-10 PO), Take by mouth, Disp: , Rfl:     tamoxifen (NOLVADEX) 20 mg tablet, Take 1 tablet (20 mg total) by mouth daily, Disp: 90 tablet, Rfl: 2  Allergies   Allergen Reactions    Other Other (See Comments)     Scallops-pt states she has swelling and vomiting  Also seasonal          Review of Systems   Constitutional: Negative  HENT: Negative  Eyes: Negative  Respiratory: Negative  Cardiovascular: Negative  Gastrointestinal: Negative  Endocrine: Negative  Genitourinary: Negative  LMP approximately November 2019, irregular menses   Musculoskeletal: Negative  Skin: Negative  Allergic/Immunologic: Positive for food allergies (scallops)  Neurological: Negative  Hematological: Negative  Psychiatric/Behavioral: Negative  OBJECTIVE:   /78   Pulse 82   Temp 98 3 °F (36 8 °C) (Temporal)   Resp 16   Ht 5' 6" (1 676 m)   Wt 57 4 kg (126 lb 8 7 oz)   SpO2 99%   BMI 20 42 kg/m²   Pain Assessment:  0  ECOG/Zubrod/WHO: 0 - Asymptomatic    Physical Exam   Constitutional: She appears well-developed  HENT:   Head: Normocephalic  Hair is normal    Mouth/Throat: Oropharynx is clear and moist    Eyes: EOM are normal  No scleral icterus  Neck: Neck supple  No spinous process tenderness present  No edema present  Cardiovascular: Normal rate and regular rhythm  Pulmonary/Chest: Effort normal and breath sounds normal  No respiratory distress  She has no wheezes  She exhibits no tenderness  No breast tenderness  There is no supraclavicular or axillary adenopathy palpable  The skin in the radiated field is in good condition  There is no breast or arm edema  No suspicious lesions palpable in either breast   Abdominal: Soft  Bowel sounds are normal  She exhibits no distension, no ascites and no mass  There is no hepatosplenomegaly  There is no tenderness  There is no rebound  Genitourinary: No breast tenderness  Musculoskeletal: Normal range of motion  She exhibits no edema or tenderness  Lymphadenopathy:     She has no cervical adenopathy  She has no axillary adenopathy  Neurological: She is alert  She has normal strength  No cranial nerve deficit  Coordination and gait normal    Skin: Skin is intact  Psychiatric: She has a normal mood and affect  Her speech is normal and behavior is normal             RESULTS    Lab Results: No results found for this or any previous visit (from the past 672 hour(s))  Imaging Studies:No results found  Assessment/Plan:  No orders of the defined types were placed in this encounter  Amrik Lobato is a 48y o  year old female is 3 years post adjuvant radiation therapy for left breast carcinoma  She has no clinical evidence of recurrence    Her last mammogram from August was stable  She will be undergoing follow-up MRI of the breast next month  She is taking tamoxifen which she is tolerating well  She will return for follow-up visit in 1 year  Nay Rutherford MD  1/27/2020,9:14 AM    Portions of the record may have been created with voice recognition software   Occasional wrong word or "sound a like" substitutions may have occurred due to the inherent limitations of voice recognition software   Read the chart carefully and recognize, using context, where substitutions have occurred

## 2020-01-27 NOTE — PROGRESS NOTES
Adrianna Babb 1965 is a 48 y o  female       Follow up visit   Adrianna Babb is a 48year old female who is nearly 3 years post adjuvant radiation therapy for Stage IA  left breast carcinoma  She was last seen 1/7/19  She remains on tamoxifen  2/2019 had bilateral breast MRI: findings benign  She had a Med Onc f/u in April, and discussed not taking her Tamoxifen regularly  When she wouldn't feel well, she'd stop taking, then resume again  Her periods had stopped when she started Tamoxifen, then early 2019, the periods returned  She w agreed to take the Tamoxifen, regularly, then would see med onc to see if her periods had again stopped  She had GYN appt in June  She told Gyn, she has been having more regular periods, lasting 5 days  F/U in June 2019, with surgeon, CALIXTO     8/19/19 bilateral mammogram was benign  8/26/10 had f/u with med onc, stated she's been more compliant with Tamoxifen  1/6/2020 had f/u with Dr Desiree Meyer  CALIXTO  HAS tightness of the left axilla, and was advised to do massage and strengthening/ROM  Today, she has no breast complaints  She continues to take Tamoxifen daily, no side effects reported  2/11/2020   MRI breasts  3/2/2020  F/U with med onc  8/24/2020  Mammogram  9/14/2020  F/U with Dr Desiree Meyer       Malignant neoplasm of upper-outer quadrant of left breast in female, estrogen receptor positive ([facility] [address] )    11/2016 Initial Diagnosis     Malignant neoplasm of upper-outer quadrant of left breast in female, estrogen receptor positive ([facility] [address] )      11/17/2016 Biopsy     Left breast biopsy: invasive and in situ ductal carcinoma       1/3/2017 Surgery     Left lumpectomy and sentinel node biopsy  Final staging: Stage IA IDC, strongly ER/NC +, HER2-      2017 Genetic Testing     Oncotype score was 10  BRCA test was negative        2/2017 -  Hormone Therapy     Tamoxifen       3/13/2017 - 4/24/2017 Radiation     left breast to a dose of 5000 cGy followed by an additional 1000 cGy to the primary site         Clinical Trial: no      Health Maintenance   Topic Date Due    CRC Screening: Colonoscopy  1965    Pneumococcal Vaccine: Pediatrics (0 to 5 Years) and At-Risk Patients (6 to 59 Years) (1 of 1 - PPSV23) 06/24/1973    HIV Screening  06/24/1982    Annual Physical  06/24/1985    PT PLAN OF CARE  03/13/2019    Cervical Cancer Screening  05/01/2020    Depression Screening PHQ  06/03/2020    MAMMOGRAM  08/19/2020    BMI: Adult  01/20/2021    DTaP,Tdap,and Td Vaccines (2 - Td) 02/15/2026    Pneumococcal Vaccine: 65+ Years (1 of 2 - PCV13) 06/24/2032    Influenza Vaccine  Completed    HIB Vaccine  Aged Out    Hepatitis B Vaccine  Aged Out    IPV Vaccine  Aged Out    Hepatitis A Vaccine  Aged Out    Meningococcal ACWY Vaccine  Aged Out    HPV Vaccine  Aged Out       Patient Active Problem List   Diagnosis    Malignant neoplasm of upper-outer quadrant of left breast in female, estrogen receptor positive ([address] )    Right lower quadrant pain    Rectal pain    External hemorrhoid    Narrowing of stools    Vaginal discharge    Encntr for gyn exam (general) (routine) w abnormal findings    Vaginal relaxation    Routine screening for STI (sexually transmitted infection)    Use of tamoxifen (Nolvadex)    Inconclusive mammogram due to dense breasts    Varicose veins of right lower extremity with pain    Coccyx pain    Heartburn    Pelvic floor relaxation    Bloating symptom     Past Medical History:   Diagnosis Date    Abdominal pain, left lower quadrant     Anxiety     BRCA negative     Common cold     Environmental and seasonal allergies     Exercises 5 to 6 times per week     Food allergy     scallops    Inconclusive mammogram due to dense breasts     Low back pain     Malignant neoplasm of upper-outer quadrant of left breast in female, estrogen receptor positive ([address] ) 2017    Seasonal allergies     Use of tamoxifen (Nolvadex)     Vaginal discharge     Vaginal irritation     Vulvar burning     Vulvar irritation      Past Surgical History:   Procedure Laterality Date    APPENDECTOMY      BREAST BIOPSY Left     COLONOSCOPY      MAMMO NEEDLE LOCALIZATION LEFT (ALL INC) Left 1/3/2017    [place] BIOPSY/EXCISION, LYMPH NODE(S) Left 1/3/2017    Procedure: BIOPSY LYMPH NODE SENTINEL @ [address];  Surgeon: Alisa Eugene MD;  [facility];  Service: Surgical Oncology    WA MASTECTOMY, PARTIAL Left 1/3/2017    Procedure: LUMPECTOMY BREAST NEEDLE LOCALIZED @ 0930;  Surgeon: Alisa Eugene MD;  [facility];  Service: Surgical Oncology    SENTINEL LYMPH NODE BIOPSY      US GUIDED BREAST BIOPSY LEFT COMPLETE Left 11/17/2016     Family History   Problem Relation Age of Onset    Lung cancer Mother         mother age 59    Lung cancer Father         father age 66    Breast cancer Sister         age dx unk    Breast cancer Paternal Aunt         aunt 50    No Known Problems Brother     No Known Problems Daughter     No Known Problems Daughter     No Known Problems Maternal Aunt     No Known Problems Maternal Aunt      Social History     Socioeconomic History    Marital status: /Civil Union     Spouse name: Not on file    Number of children: 2    Years of education: Not on file    Highest education level: Not on file   Occupational History    Occupation:    Social Needs    Financial resource strain: Not on file    Food insecurity:     Worry: Not on file     Inability: Not on file    Transportation needs:     Medical: Not on file     Non-medical: Not on file   Tobacco Use    Smoking status: Current Some Day Smoker     Types: Cigarettes    Smokeless tobacco: Never Used   Substance and Sexual Activity    Alcohol use: Yes     Comment: occasional     Drug use: No    Sexual activity: Yes     Partners: Male     Birth control/protection: None   Lifestyle    Physical activity:     Days per week: Not on file     Minutes per session: Not on file    Stress: Not on file   Relationships    Social connections:     Talks on phone: Not on file     Gets together: Not on file     Attends [employer] service: Not on file     Active member of club or organization: Not on file     Attends meetings of clubs or organizations: Not on file     Relationship status: Not on file    Intimate partner violence:     Fear of current or ex partner: Not on file     Emotionally abused: Not on file     Physically abused: Not on file     Forced sexual activity: Not on file   Other Topics Concern    Not on file   Social History Narrative    Exercises 5-6 times per week    Full time employment    Lives with spouse    No caffeine use    2 children ages 25 and 21       Current Outpatient Medications:     ALPRAZolam (XANAX) 0 25 mg tablet, Take 0 25 mg by mouth daily as needed for anxiety, Disp: , Rfl:     Calcium 500 MG tablet, Take 1 tablet by mouth daily, Disp: , Rfl:     COLLAGEN PO, Take 1 tablet by mouth daily, Disp: , Rfl:     fexofenadine (ALLEGRA) 180 MG tablet, Take 180 mg by mouth, Disp: , Rfl:     Ibuprofen 200 MG CAPS, Take by mouth as needed, Disp: , Rfl:     MILK THISTLE PO, Take 1 tablet by mouth daily, Disp: , Rfl:     Multiple Vitamins-Minerals (MULTIVITAMIN ADULT PO), Take 1 tablet by mouth daily, Disp: , Rfl:     Probiotic Product (PROBIOTIC-10 PO), Take by mouth, Disp: , Rfl:     tamoxifen (NOLVADEX) 20 mg tablet, Take 1 tablet (20 mg total) by mouth daily, Disp: 90 tablet, Rfl: 2  Allergies   Allergen Reactions    Other Other (See Comments)     Scallops-pt states she has swelling and vomiting  Also seasonal        Review of Systems:  Review of Systems   Constitutional: Negative  HENT: Negative  Eyes: Negative  Respiratory: Negative  Cardiovascular: Negative  Gastrointestinal: Negative  Endocrine: Negative  Genitourinary: Negative           LMP approximately November 2019, irregular menses Musculoskeletal: Negative  Skin: Negative  Allergic/Immunologic: Positive for food allergies (scallops)  Neurological: Negative  Hematological: Negative  Psychiatric/Behavioral: Negative  Vitals:    01/27/20 0823   BP: 122/78   Pulse: 82   Resp: 16   Temp: 98 3 °F (36 8 °C)   TempSrc: Temporal   SpO2: 99%   Weight: 57 4 kg (126 lb 8 7 oz)   Height: 5' 6" (1 676 m)               Imaging:No results found      Teaching

## 2020-02-11 ENCOUNTER — HOSPITAL ENCOUNTER (OUTPATIENT)
Dept: RADIOLOGY | Facility: HOSPITAL | Age: 55
Discharge: HOME | End: 2020-02-11
Attending: SURGERY
Payer: COMMERCIAL

## 2020-02-11 VITALS — BODY MASS INDEX: 20.25 KG/M2 | HEIGHT: 66 IN | WEIGHT: 126 LBS

## 2020-02-11 DIAGNOSIS — Z17.0 MALIGNANT NEOPLASM OF UPPER-OUTER QUADRANT OF LEFT BREAST IN FEMALE, ESTROGEN RECEPTOR POSITIVE (HCC): ICD-10-CM

## 2020-02-11 DIAGNOSIS — C50.412 MALIGNANT NEOPLASM OF UPPER-OUTER QUADRANT OF LEFT BREAST IN FEMALE, ESTROGEN RECEPTOR POSITIVE (HCC): ICD-10-CM

## 2020-02-11 DIAGNOSIS — R92.2 INCONCLUSIVE MAMMOGRAM DUE TO DENSE BREASTS: ICD-10-CM

## 2020-02-11 PROCEDURE — 77049 MRI BREAST C-+ W/CAD BI: CPT

## 2020-02-11 PROCEDURE — A9585 GADOBUTROL INJECTION: HCPCS | Performed by: SURGERY

## 2020-02-11 PROCEDURE — C8908 MRI W/O FOL W/CONT, BREAST,: HCPCS

## 2020-02-11 RX ADMIN — GADOBUTROL 6 ML: 604.72 INJECTION INTRAVENOUS at 18:01

## 2020-02-26 ENCOUNTER — TRANSCRIBE ORDERS (OUTPATIENT)
Dept: LAB | Facility: HOSPITAL | Age: 55
End: 2020-02-26

## 2020-02-26 ENCOUNTER — APPOINTMENT (OUTPATIENT)
Dept: LAB | Facility: HOSPITAL | Age: 55
End: 2020-02-26
Payer: COMMERCIAL

## 2020-02-26 DIAGNOSIS — C50.412 MALIGNANT NEOPLASM OF UPPER-OUTER QUADRANT OF LEFT BREAST IN FEMALE, ESTROGEN RECEPTOR POSITIVE (HCC): ICD-10-CM

## 2020-02-26 DIAGNOSIS — Z17.0 MALIGNANT NEOPLASM OF UPPER-OUTER QUADRANT OF LEFT BREAST IN FEMALE, ESTROGEN RECEPTOR POSITIVE (HCC): ICD-10-CM

## 2020-02-26 LAB
ALBUMIN SERPL BCP-MCNC: 3.5 G/DL (ref 3.5–5)
ALP SERPL-CCNC: 82 U/L (ref 46–116)
ALT SERPL W P-5'-P-CCNC: 23 U/L (ref 12–78)
ANION GAP SERPL CALCULATED.3IONS-SCNC: 5 MMOL/L (ref 4–13)
AST SERPL W P-5'-P-CCNC: 22 U/L (ref 5–45)
BASOPHILS # BLD AUTO: 0.04 THOUSANDS/ΜL (ref 0–0.1)
BASOPHILS NFR BLD AUTO: 1 % (ref 0–1)
BILIRUB SERPL-MCNC: 0.28 MG/DL (ref 0.2–1)
BUN SERPL-MCNC: 22 MG/DL (ref 5–25)
CALCIUM SERPL-MCNC: 9.5 MG/DL (ref 8.3–10.1)
CANCER AG27-29 SERPL-ACNC: 14.5 U/ML (ref 0–42.3)
CHLORIDE SERPL-SCNC: 108 MMOL/L (ref 100–108)
CO2 SERPL-SCNC: 28 MMOL/L (ref 21–32)
CREAT SERPL-MCNC: 0.61 MG/DL (ref 0.6–1.3)
EOSINOPHIL # BLD AUTO: 0.18 THOUSAND/ΜL (ref 0–0.61)
EOSINOPHIL NFR BLD AUTO: 3 % (ref 0–6)
ERYTHROCYTE [DISTWIDTH] IN BLOOD BY AUTOMATED COUNT: 13.3 % (ref 11.6–15.1)
GFR SERPL CREATININE-BSD FRML MDRD: 105 ML/MIN/1.73SQ M
GLUCOSE SERPL-MCNC: 85 MG/DL (ref 65–140)
HCT VFR BLD AUTO: 39.8 % (ref 34.8–46.1)
HGB BLD-MCNC: 12.6 G/DL (ref 11.5–15.4)
IMM GRANULOCYTES # BLD AUTO: 0.03 THOUSAND/UL (ref 0–0.2)
IMM GRANULOCYTES NFR BLD AUTO: 1 % (ref 0–2)
LYMPHOCYTES # BLD AUTO: 1.86 THOUSANDS/ΜL (ref 0.6–4.47)
LYMPHOCYTES NFR BLD AUTO: 29 % (ref 14–44)
MCH RBC QN AUTO: 30 PG (ref 26.8–34.3)
MCHC RBC AUTO-ENTMCNC: 31.7 G/DL (ref 31.4–37.4)
MCV RBC AUTO: 95 FL (ref 82–98)
MONOCYTES # BLD AUTO: 0.85 THOUSAND/ΜL (ref 0.17–1.22)
MONOCYTES NFR BLD AUTO: 13 % (ref 4–12)
NEUTROPHILS # BLD AUTO: 3.47 THOUSANDS/ΜL (ref 1.85–7.62)
NEUTS SEG NFR BLD AUTO: 53 % (ref 43–75)
NRBC BLD AUTO-RTO: 0 /100 WBCS
PLATELET # BLD AUTO: 230 THOUSANDS/UL (ref 149–390)
PMV BLD AUTO: 12.6 FL (ref 8.9–12.7)
POTASSIUM SERPL-SCNC: 4.3 MMOL/L (ref 3.5–5.3)
PROT SERPL-MCNC: 7.3 G/DL (ref 6.4–8.2)
RBC # BLD AUTO: 4.2 MILLION/UL (ref 3.81–5.12)
SODIUM SERPL-SCNC: 141 MMOL/L (ref 136–145)
WBC # BLD AUTO: 6.43 THOUSAND/UL (ref 4.31–10.16)

## 2020-02-26 PROCEDURE — 80053 COMPREHEN METABOLIC PANEL: CPT

## 2020-02-26 PROCEDURE — 85025 COMPLETE CBC W/AUTO DIFF WBC: CPT

## 2020-02-26 PROCEDURE — 86300 IMMUNOASSAY TUMOR CA 15-3: CPT

## 2020-02-26 PROCEDURE — 36415 COLL VENOUS BLD VENIPUNCTURE: CPT

## 2020-03-02 ENCOUNTER — OFFICE VISIT (OUTPATIENT)
Dept: HEMATOLOGY ONCOLOGY | Facility: CLINIC | Age: 55
End: 2020-03-02
Payer: COMMERCIAL

## 2020-03-02 VITALS
OXYGEN SATURATION: 98 % | HEART RATE: 75 BPM | DIASTOLIC BLOOD PRESSURE: 70 MMHG | RESPIRATION RATE: 18 BRPM | SYSTOLIC BLOOD PRESSURE: 112 MMHG | BODY MASS INDEX: 20.51 KG/M2 | HEIGHT: 66 IN | WEIGHT: 127.6 LBS | TEMPERATURE: 98.5 F

## 2020-03-02 DIAGNOSIS — Z17.0 MALIGNANT NEOPLASM OF UPPER-OUTER QUADRANT OF LEFT BREAST IN FEMALE, ESTROGEN RECEPTOR POSITIVE (HCC): Primary | ICD-10-CM

## 2020-03-02 DIAGNOSIS — R14.0 ABDOMINAL BLOATING: ICD-10-CM

## 2020-03-02 DIAGNOSIS — C50.412 MALIGNANT NEOPLASM OF UPPER-OUTER QUADRANT OF LEFT BREAST IN FEMALE, ESTROGEN RECEPTOR POSITIVE (HCC): Primary | ICD-10-CM

## 2020-03-02 PROCEDURE — 99214 OFFICE O/P EST MOD 30 MIN: CPT | Performed by: PHYSICIAN ASSISTANT

## 2020-03-02 RX ORDER — OLOPATADINE HYDROCHLORIDE 2 MG/ML
SOLUTION/ DROPS OPHTHALMIC
COMMUNITY
Start: 2020-01-23

## 2020-03-02 NOTE — PATIENT INSTRUCTIONS
Try Pepcid over the counter for acid reflux How Severe Are Your Spot(S)?: mild Have Your Spot(S) Been Treated In The Past?: has been treated Hpi Title: Evaluation of Skin Lesions When Was It Treated?: R dorsal wrist edc'd 1/18/2017

## 2020-03-02 NOTE — PROGRESS NOTES
Hematology/Oncology Outpatient Follow-up  Michaelle Ye 48 y o  female 1965 [id number]    Date:  3/2/2020      Assessment and Plan:  1  Malignant neoplasm of upper-outer quadrant of left breast in female, estrogen receptor positive [facility])  42-year-old female presents for follow-up regarding history of ERPR positive, HER2 negative left-sided breast cancer diagnosed in 2016 and was started on tamoxifen 2017  During this time patient continues to be noncompliant with tamoxifen  We discussed this at length  Reviewed at length with patient for tamoxifen to be effective at decreasing recurrence needs to be taken as prescribed  I after reason for not taking he states that it makes her lyon  Reviewed that we could give her medication to suppress her ovaries in trying aromatase inhibitor  BSO could also be completed  She states that she does not wish to put into a menopausal state  Patient is very poor historian in regards to when she has been taking tamoxifen due to when she has not been and her menstrual periods and spotting  She states that she has been compliant with tamoxifen for approximately 3 months taking every single day  Her last period was approximately 2-3 months ago  She still gets a menstrual cycle while taking tamoxifen  I reviewed that this is not safe to be taking it this way as intermittent bleeding could be indicative of uterine cancer at a side effect of tamoxifen  She is aware of this  I reviewed that she needs to take tamoxifen every single day  She should record when she has any vaginal bleeding and also required any days she is missing taking her tamoxifen  She is aware of these risks and not being well understood in regarding to the bleeding  She was suggested to follow-up with OBGYN  Follow-up 6 months with labs  - CBC and differential; Future  - Comprehensive metabolic panel; Future  - Cancer antigen 27 29; Future    2   Abdominal bloating  She complains of abdominal bloating for a few months  She complained of this her last visit 6 months ago  Still bothersome to her  She does note that she has burping as well as phlegm in the morning when she wakes up  I suggested this could be indicative of GERD and she was recommended to start Pepcid  Consult with GI     - Ambulatory referral to Gastroenterology; Future        HPI:     Malignant neoplasm of upper-outer quadrant of left breast in female, estrogen receptor positive ([facility] [address] )    11/2016 Initial Diagnosis     Malignant neoplasm of upper-outer quadrant of left breast in female, estrogen receptor positive ([facility] [address] )      11/17/2016 Biopsy     Left breast biopsy: invasive and in situ ductal carcinoma       1/3/2017 Surgery     Left lumpectomy and sentinel node biopsy  Final staging: Stage IA IDC, strongly ER/[place] +, HER2-      2017 Genetic Testing     Oncotype score was 10  BRCA test was negative  2/2017 -  Hormone Therapy     Tamoxifen       3/13/2017 - 4/24/2017 Radiation     left breast to a dose of 5000 cGy followed by an additional 1000 cGy to the primary site         Interval history:  She continues to non-compliant with tamoxifen  For at least the last 3 months she has been taking  She started with spotting yesterday  Her last normal period was "at least 2 months ago"; prior to this was about 2-3 months ago  She states when she is compliant with tamoxifen taking this every day she gets period every 2-3 months  States the tamoxifen makes her lyon  ROS: Review of Systems   Constitutional: Negative for appetite change, chills, fever and unexpected weight change  HENT: Positive for postnasal drip (at night )  Negative for mouth sores and nosebleeds  Respiratory: Negative for cough and shortness of breath  Cardiovascular: Negative for chest pain, palpitations and leg swelling  Gastrointestinal: Negative for abdominal pain, diarrhea, nausea and vomiting          Abdominal bloating   Loose stools for 3 weeks   Burping after eating a few times a week    Genitourinary: Negative for difficulty urinating, dysuria and hematuria  Musculoskeletal: Negative for arthralgias  Pain in lower back when wake up in morning      Skin: Negative  Neurological: Negative for dizziness, weakness, light-headedness, numbness and headaches  Hematological: Negative  Psychiatric/Behavioral: Negative          Past Medical History:   Diagnosis Date    Abdominal pain, left lower quadrant     Anxiety     BRCA negative     Common cold     Environmental and seasonal allergies     Exercises 5 to 6 times per week     Food allergy     scallops    Inconclusive mammogram due to dense breasts     Low back pain     Malignant neoplasm of upper-outer quadrant of left breast in female, estrogen receptor positive ([facility] [address] ) 2017    Seasonal allergies     Use of tamoxifen (Nolvadex)     Vaginal discharge     Vaginal irritation     Vulvar burning     Vulvar irritation        Past Surgical History:   Procedure Laterality Date    APPENDECTOMY      BREAST BIOPSY Left     COLONOSCOPY      MAMMO NEEDLE LOCALIZATION LEFT (ALL INC) Left 1/3/2017    CO BIOPSY/EXCISION, LYMPH NODE(S) Left 1/3/2017    Procedure: BIOPSY LYMPH NODE SENTINEL @ [address];  Surgeon: Grover Garcia MD;  Location: [facility];  Service: Surgical Oncology    CO MASTECTOMY, PARTIAL Left 1/3/2017    Procedure: LUMPECTOMY BREAST NEEDLE LOCALIZED @ 0930;  Surgeon: Grover Garcia MD;  Location: [facility];  Service: Surgical Oncology    SENTINEL LYMPH NODE BIOPSY      US GUIDED BREAST BIOPSY LEFT COMPLETE Left 11/17/2016       Social History     Socioeconomic History    Marital status: /Civil Union     Spouse name: None    Number of children: 2    Years of education: None    Highest education level: None   Occupational History    Occupation:    Social Needs    Financial resource strain: None    Food insecurity:     Worry: None     Inability: None    Transportation needs:     Medical: None     Non-medical: None   Tobacco Use    Smoking status: Current Some Day Smoker     Types: Cigarettes    Smokeless tobacco: Never Used   Substance and Sexual Activity    Alcohol use: Yes     Comment: occasional     Drug use: No    Sexual activity: Yes     Partners: Male     Birth control/protection: None   Lifestyle    Physical activity:     Days per week: None     Minutes per session: None    Stress: None   Relationships    Social connections:     Talks on phone: None     Gets together: None     Attends Zoroastrian service: None     Active member of club or organization: None     Attends meetings of clubs or organizations: None     Relationship status: None    Intimate partner violence:     Fear of current or ex partner: None     Emotionally abused: None     Physically abused: None     Forced sexual activity: None   Other Topics Concern    None   Social History Narrative    Exercises 5-6 times per week    Full time employment    Lives with spouse    No caffeine use    2 children ages 25 and 21       Family History   Problem Relation Age of Onset    Lung cancer Mother         mother age 61    Lung cancer Father         father age 66    Breast cancer Sister         age dx unk    Breast cancer Paternal Aunt         aunt 50    No Known Problems Brother     No Known Problems Daughter     No Known Problems Daughter     No Known Problems Maternal Aunt     No Known Problems Maternal Aunt        Allergies   Allergen Reactions    Other Other (See Comments)     Scallops-pt states she has swelling and vomiting  Also seasonal          Current Outpatient Medications:     ALPRAZolam (XANAX) 0 25 mg tablet, Take 0 25 mg by mouth daily as needed for anxiety, Disp: , Rfl:     Calcium 500 MG tablet, Take 1 tablet by mouth daily, Disp: , Rfl:     COLLAGEN PO, Take 1 tablet by mouth daily, Disp: , Rfl:     fexofenadine (ALLEGRA) 180 MG tablet, Take 180 mg by mouth, Disp: , Rfl:     Ibuprofen 200 MG CAPS, Take by mouth as needed, Disp: , Rfl:     MILK THISTLE PO, Take 1 tablet by mouth daily, Disp: , Rfl:     Multiple Vitamins-Minerals (MULTIVITAMIN ADULT PO), Take 1 tablet by mouth daily, Disp: , Rfl:     olopatadine HCl (PATADAY) 0 2 % opth drops, , Disp: , Rfl:     Probiotic Product (PROBIOTIC-10 PO), Take by mouth, Disp: , Rfl:     tamoxifen (NOLVADEX) 20 mg tablet, Take 1 tablet (20 mg total) by mouth daily, Disp: 90 tablet, Rfl: 2      Physical Exam:  /70 (BP Location: Left arm, Patient Position: Sitting, Cuff Size: Adult)   Pulse 75   Temp 98 5 °F (36 9 °C)   Resp 18   Ht 5' 6" (1 676 m)   Wt 57 9 kg (127 lb 9 6 oz)   SpO2 98%   BMI 20 60 kg/m²     Physical Exam   Constitutional: She is oriented to person, place, and time  She appears well-developed and well-nourished  No distress  HENT:   Head: Normocephalic and atraumatic  Eyes: Conjunctivae are normal  No scleral icterus  Neck: Normal range of motion  Neck supple  Cardiovascular: Normal rate, regular rhythm and normal heart sounds  No murmur heard  Pulmonary/Chest: Effort normal and breath sounds normal  No respiratory distress  Abdominal: Soft  There is no tenderness  Musculoskeletal: Normal range of motion  She exhibits no edema or tenderness  Lymphadenopathy:     She has no cervical adenopathy  Neurological: She is alert and oriented to person, place, and time  No cranial nerve deficit  Skin: Skin is warm and dry  Psychiatric: She has a normal mood and affect  Vitals reviewed      Labs:  Lab Results   Component Value Date    WBC 6 43 02/26/2020    HGB 12 6 02/26/2020    HCT 39 8 02/26/2020    MCV 95 02/26/2020     02/26/2020     Lab Results   Component Value Date    K 4 3 02/26/2020     02/26/2020    CO2 28 02/26/2020    BUN 22 02/26/2020    CREATININE 0 61 02/26/2020    GLUF 87 01/07/2019    CALCIUM 9 5 02/26/2020    AST 22 02/26/2020    ALT 23 02/26/2020    ALKPHOS 82 02/26/2020    EGFR 105 02/26/2020     Patient voiced understanding and agreement in the above discussion  Aware to contact our office with questions/symptoms in the interim  This note has been generated by voice recognition software system  Therefore, there may be spelling, grammar, and or syntax errors  Please contact if questions arise

## 2020-04-24 ENCOUNTER — TELEPHONE (OUTPATIENT)
Dept: GYNECOLOGY | Facility: CLINIC | Age: 55
End: 2020-04-24

## 2020-05-01 ENCOUNTER — TELEMEDICINE (OUTPATIENT)
Dept: GYNECOLOGY | Facility: CLINIC | Age: 55
End: 2020-05-01
Payer: COMMERCIAL

## 2020-05-01 VITALS — WEIGHT: 127 LBS | HEIGHT: 66 IN | BODY MASS INDEX: 20.41 KG/M2

## 2020-05-01 DIAGNOSIS — N93.9 ABNORMAL UTERINE BLEEDING (AUB): Primary | ICD-10-CM

## 2020-05-01 PROCEDURE — 99214 OFFICE O/P EST MOD 30 MIN: CPT | Performed by: NURSE PRACTITIONER

## 2020-05-11 ENCOUNTER — HOSPITAL ENCOUNTER (OUTPATIENT)
Dept: RADIOLOGY | Facility: HOSPITAL | Age: 55
Discharge: HOME | End: 2020-05-11
Payer: COMMERCIAL

## 2020-05-11 DIAGNOSIS — N93.9 ABNORMAL UTERINE BLEEDING (AUB): ICD-10-CM

## 2020-05-11 PROCEDURE — 76856 US EXAM PELVIC COMPLETE: CPT

## 2020-05-11 PROCEDURE — 76830 TRANSVAGINAL US NON-OB: CPT

## 2020-06-01 ENCOUNTER — OFFICE VISIT (OUTPATIENT)
Dept: GYNECOLOGY | Facility: CLINIC | Age: 55
End: 2020-06-01
Payer: COMMERCIAL

## 2020-06-01 VITALS
BODY MASS INDEX: 20.05 KG/M2 | DIASTOLIC BLOOD PRESSURE: 70 MMHG | TEMPERATURE: 99.9 F | WEIGHT: 124.2 LBS | SYSTOLIC BLOOD PRESSURE: 132 MMHG

## 2020-06-01 DIAGNOSIS — Z85.3 HISTORY OF CANCER OF LEFT BREAST: ICD-10-CM

## 2020-06-01 DIAGNOSIS — Z80.3 FAMILY HISTORY OF BREAST CANCER IN SISTER: ICD-10-CM

## 2020-06-01 DIAGNOSIS — D25.0 SUBMUCOUS LEIOMYOMA OF UTERUS: ICD-10-CM

## 2020-06-01 DIAGNOSIS — N93.9 ABNORMAL UTERINE BLEEDING (AUB): Primary | ICD-10-CM

## 2020-06-01 PROCEDURE — 99213 OFFICE O/P EST LOW 20 MIN: CPT | Performed by: OBSTETRICS & GYNECOLOGY

## 2020-08-24 ENCOUNTER — HOSPITAL ENCOUNTER (OUTPATIENT)
Dept: MAMMOGRAPHY | Facility: CLINIC | Age: 55
Discharge: HOME | End: 2020-08-24
Payer: COMMERCIAL

## 2020-08-24 VITALS — BODY MASS INDEX: 19.93 KG/M2 | HEIGHT: 66 IN | WEIGHT: 124 LBS | TEMPERATURE: 97.2 F

## 2020-08-24 DIAGNOSIS — Z17.0 MALIGNANT NEOPLASM OF UPPER-OUTER QUADRANT OF LEFT BREAST IN FEMALE, ESTROGEN RECEPTOR POSITIVE (HCC): ICD-10-CM

## 2020-08-24 DIAGNOSIS — C50.412 MALIGNANT NEOPLASM OF UPPER-OUTER QUADRANT OF LEFT BREAST IN FEMALE, ESTROGEN RECEPTOR POSITIVE (HCC): ICD-10-CM

## 2020-08-24 PROCEDURE — G0279 TOMOSYNTHESIS, MAMMO: HCPCS

## 2020-08-24 PROCEDURE — 77066 DX MAMMO INCL CAD BI: CPT

## 2020-08-28 ENCOUNTER — TELEPHONE (OUTPATIENT)
Dept: HEMATOLOGY ONCOLOGY | Facility: CLINIC | Age: 55
End: 2020-08-28

## 2020-08-29 ENCOUNTER — APPOINTMENT (OUTPATIENT)
Dept: LAB | Facility: HOSPITAL | Age: 55
End: 2020-08-29
Payer: COMMERCIAL

## 2020-08-29 DIAGNOSIS — Z17.0 MALIGNANT NEOPLASM OF UPPER-OUTER QUADRANT OF LEFT BREAST IN FEMALE, ESTROGEN RECEPTOR POSITIVE (HCC): ICD-10-CM

## 2020-08-29 DIAGNOSIS — C50.412 MALIGNANT NEOPLASM OF UPPER-OUTER QUADRANT OF LEFT BREAST IN FEMALE, ESTROGEN RECEPTOR POSITIVE (HCC): ICD-10-CM

## 2020-08-29 LAB
ALBUMIN SERPL BCP-MCNC: 3.8 G/DL (ref 3.5–5)
ALP SERPL-CCNC: 41 U/L (ref 46–116)
ALT SERPL W P-5'-P-CCNC: 27 U/L (ref 12–78)
ANION GAP SERPL CALCULATED.3IONS-SCNC: 4 MMOL/L (ref 4–13)
AST SERPL W P-5'-P-CCNC: 19 U/L (ref 5–45)
BASOPHILS # BLD AUTO: 0.06 THOUSANDS/ΜL (ref 0–0.1)
BASOPHILS NFR BLD AUTO: 1 % (ref 0–1)
BILIRUB SERPL-MCNC: 0.63 MG/DL (ref 0.2–1)
BUN SERPL-MCNC: 15 MG/DL (ref 5–25)
CALCIUM SERPL-MCNC: 9.6 MG/DL (ref 8.3–10.1)
CANCER AG27-29 SERPL-ACNC: 22.7 U/ML (ref 0–42.3)
CHLORIDE SERPL-SCNC: 106 MMOL/L (ref 100–108)
CO2 SERPL-SCNC: 30 MMOL/L (ref 21–32)
CREAT SERPL-MCNC: 0.67 MG/DL (ref 0.6–1.3)
EOSINOPHIL # BLD AUTO: 0.14 THOUSAND/ΜL (ref 0–0.61)
EOSINOPHIL NFR BLD AUTO: 2 % (ref 0–6)
ERYTHROCYTE [DISTWIDTH] IN BLOOD BY AUTOMATED COUNT: 14.5 % (ref 11.6–15.1)
GFR SERPL CREATININE-BSD FRML MDRD: 101 ML/MIN/1.73SQ M
GLUCOSE SERPL-MCNC: 69 MG/DL (ref 65–140)
HCT VFR BLD AUTO: 39 % (ref 34.8–46.1)
HGB BLD-MCNC: 12.7 G/DL (ref 11.5–15.4)
IMM GRANULOCYTES # BLD AUTO: 0.03 THOUSAND/UL (ref 0–0.2)
IMM GRANULOCYTES NFR BLD AUTO: 0 % (ref 0–2)
LYMPHOCYTES # BLD AUTO: 1.58 THOUSANDS/ΜL (ref 0.6–4.47)
LYMPHOCYTES NFR BLD AUTO: 23 % (ref 14–44)
MCH RBC QN AUTO: 30.5 PG (ref 26.8–34.3)
MCHC RBC AUTO-ENTMCNC: 32.6 G/DL (ref 31.4–37.4)
MCV RBC AUTO: 94 FL (ref 82–98)
MONOCYTES # BLD AUTO: 0.75 THOUSAND/ΜL (ref 0.17–1.22)
MONOCYTES NFR BLD AUTO: 11 % (ref 4–12)
NEUTROPHILS # BLD AUTO: 4.18 THOUSANDS/ΜL (ref 1.85–7.62)
NEUTS SEG NFR BLD AUTO: 63 % (ref 43–75)
NRBC BLD AUTO-RTO: 0 /100 WBCS
PLATELET # BLD AUTO: 213 THOUSANDS/UL (ref 149–390)
PMV BLD AUTO: 12.7 FL (ref 8.9–12.7)
POTASSIUM SERPL-SCNC: 4.2 MMOL/L (ref 3.5–5.3)
PROT SERPL-MCNC: 7.1 G/DL (ref 6.4–8.2)
RBC # BLD AUTO: 4.17 MILLION/UL (ref 3.81–5.12)
SODIUM SERPL-SCNC: 140 MMOL/L (ref 136–145)
WBC # BLD AUTO: 6.74 THOUSAND/UL (ref 4.31–10.16)

## 2020-08-29 PROCEDURE — 80053 COMPREHEN METABOLIC PANEL: CPT

## 2020-08-29 PROCEDURE — 86300 IMMUNOASSAY TUMOR CA 15-3: CPT

## 2020-08-29 PROCEDURE — 85025 COMPLETE CBC W/AUTO DIFF WBC: CPT

## 2020-08-29 PROCEDURE — 36415 COLL VENOUS BLD VENIPUNCTURE: CPT

## 2020-08-31 ENCOUNTER — OFFICE VISIT (OUTPATIENT)
Dept: HEMATOLOGY ONCOLOGY | Facility: CLINIC | Age: 55
End: 2020-08-31
Payer: COMMERCIAL

## 2020-08-31 VITALS
WEIGHT: 125 LBS | HEART RATE: 66 BPM | SYSTOLIC BLOOD PRESSURE: 126 MMHG | TEMPERATURE: 98.3 F | HEIGHT: 66 IN | RESPIRATION RATE: 18 BRPM | OXYGEN SATURATION: 99 % | BODY MASS INDEX: 20.09 KG/M2 | DIASTOLIC BLOOD PRESSURE: 68 MMHG

## 2020-08-31 DIAGNOSIS — R14.0 ABDOMINAL BLOATING: ICD-10-CM

## 2020-08-31 DIAGNOSIS — Z17.0 MALIGNANT NEOPLASM OF UPPER-OUTER QUADRANT OF LEFT BREAST IN FEMALE, ESTROGEN RECEPTOR POSITIVE (HCC): Primary | ICD-10-CM

## 2020-08-31 DIAGNOSIS — C50.412 MALIGNANT NEOPLASM OF UPPER-OUTER QUADRANT OF LEFT BREAST IN FEMALE, ESTROGEN RECEPTOR POSITIVE (HCC): Primary | ICD-10-CM

## 2020-08-31 PROCEDURE — 99214 OFFICE O/P EST MOD 30 MIN: CPT | Performed by: INTERNAL MEDICINE

## 2020-08-31 NOTE — PROGRESS NOTES
HPI:  Follow-up visit for hormone receptor positive, HER-2 negative, G1, T1c, 1 4 cm, stage I invasive mammary carcinoma in the left breast   Patient had lumpectomy and sentinel lymph node sampling in January 2017   Oncotype score was 10  BRCA test was negative  Since February 2017 she has been on tamoxifen  She received radiation  She is still premenopausal   Menstrual periods are becoming less frequent  She follows with her gynecologist   We talked about ovarian suppression or BSO because results are better when combined with tamoxifen or AI  She states she understands that  She does not want to do that  She wants to stay on tamoxifen and go into menopause  naturally   Occasionally bloated feeling in the lower abdomen  She had colonoscopy 2 years ago  She is being referred to colorectal surgeon  Has some anxiety                       Current Outpatient Medications:     ALPRAZolam (XANAX) 0 25 mg tablet, Take 0 25 mg by mouth daily as needed for anxiety, Disp: , Rfl:     azelastine (ASTELIN) 0 1 % nasal spray, 2 sprays into each nostril 2 (two) times a day as needed for rhinitis or allergies Use in each nostril as directed, Disp: 1 Bottle, Rfl: 5    Calcium 500 MG tablet, Take 1 tablet by mouth daily, Disp: , Rfl:     COLLAGEN PO, Take 1 tablet by mouth daily, Disp: , Rfl:     fexofenadine (ALLEGRA) 180 MG tablet, Take 1 tablet (180 mg total) by mouth daily, Disp: 90 tablet, Rfl: 2    Ibuprofen 200 MG CAPS, Take by mouth as needed, Disp: , Rfl:     MILK THISTLE PO, Take 1 tablet by mouth daily, Disp: , Rfl:     Multiple Vitamins-Minerals (MULTIVITAMIN ADULT PO), Take 1 tablet by mouth daily, Disp: , Rfl:     olopatadine HCl (PATADAY) 0 2 % opth drops, , Disp: , Rfl:     Probiotic Product (PROBIOTIC-10 PO), Take by mouth, Disp: , Rfl:     tamoxifen (NOLVADEX) 20 mg tablet, Take 1 tablet (20 mg total) by mouth daily, Disp: 90 tablet, Rfl: 2    methylPREDNISolone 4 MG tablet therapy pack, Use as directed on package (Patient not taking: Reported on 8/31/2020), Disp: 1 each, Rfl: 0    Allergies   Allergen Reactions    Other Other (See Comments)     Scallops-pt states she has swelling and vomiting  Also seasonal        Oncology History   Malignant neoplasm of upper-outer quadrant of left breast in female, estrogen receptor positive ([facility] [address] )   11/2016 Initial Diagnosis    Malignant neoplasm of upper-outer quadrant of left breast in female, estrogen receptor positive ([facility] [address] )     11/17/2016 Biopsy    Left breast biopsy: invasive and in situ ductal carcinoma      1/3/2017 Surgery    Left lumpectomy and sentinel node biopsy  Final staging: Stage IA IDC, strongly ER/[place] +, HER2-     2017 Genetic Testing    Oncotype score was 10  BRCA test was negative  2/2017 -  Hormone Therapy    Tamoxifen      3/13/2017 - 4/24/2017 Radiation    left breast to a dose of 5000 cGy followed by an additional 1000 cGy to the primary site         ROS:  08/31/20 Reviewed 13 systems: See symptoms in HPI:  Lower abdominal bloating  Anxiety  Presently no headaches, seizures, dizziness, diplopia, dysphagia, hoarseness, chest pain, palpitations, shortness of breath, cough, hemoptysis,  nausea, vomiting,  melena, hematuria, fever, chills, bleeding, bone pains, skin rash, weight loss, arthritic symptoms, tiredness ,  weakness, numbness,  claudication and gait problem  No frequent infections  Not unusually sensitive to heat or cold  No swelling of the ankles  No swollen glands  Patient is anxious         /68 (BP Location: Right arm, Patient Position: Sitting, Cuff Size: Adult)   Pulse 66   Temp 98 3 °F (36 8 °C)   Resp 18   Ht 5' 6" (1 676 m)   Wt 56 7 kg (125 lb)   SpO2 99%   BMI 20 18 kg/m²     Physical Exam[de-identified]  [address] [place] was with me in the room during examination  Alert, oriented, not in distress, no icterus, no oral thrush, no palpable neck mass, clear lung fields, regular heart rate, abdomen  soft and non tender, no palpable abdominal mass, no ascites, no edema of ankles, no calf tenderness, no focal neurological deficit, no skin rash, no palpable lymphadenopathy in the neck and axillary areas, good arterial pulses, no clubbing  Patient is anxious  Performance status 0  IMAGING:  No orders to display       LABS:  Results for orders placed or performed in visit on 08/29/20   CBC and differential   Result Value Ref Range    WBC 6 74 4 31 - 10 16 Thousand/uL    RBC 4 17 3 81 - 5 12 Million/uL    Hemoglobin 12 7 11 5 - 15 4 g/dL    Hematocrit 39 0 34 8 - 46 1 %    MCV 94 82 - 98 fL    MCH 30 5 26 8 - 34 3 pg    MCHC 32 6 31 4 - 37 4 g/dL    RDW 14 5 11 6 - 15 1 %    MPV 12 7 8 9 - 12 7 fL    Platelets 971 174 - 689 Thousands/uL    nRBC 0 /100 WBCs    Neutrophils Relative 63 43 - 75 %    Immat GRANS % 0 0 - 2 %    Lymphocytes Relative 23 14 - 44 %    Monocytes Relative 11 4 - 12 %    Eosinophils Relative 2 0 - 6 %    Basophils Relative 1 0 - 1 %    Neutrophils Absolute 4 18 1 85 - 7 62 Thousands/µL    Immature Grans Absolute 0 03 0 00 - 0 20 Thousand/uL    Lymphocytes Absolute 1 58 0 60 - 4 47 Thousands/µL    Monocytes Absolute 0 75 0 17 - 1 22 Thousand/µL    Eosinophils Absolute 0 14 0 00 - 0 61 Thousand/µL    Basophils Absolute 0 06 0 00 - 0 10 Thousands/µL   Comprehensive metabolic panel   Result Value Ref Range    Sodium 140 136 - 145 mmol/L    Potassium 4 2 3 5 - 5 3 mmol/L    Chloride 106 100 - 108 mmol/L    CO2 30 21 - 32 mmol/L    ANION GAP 4 4 - 13 mmol/L    BUN 15 5 - 25 mg/dL    Creatinine 0 67 0 60 - 1 30 mg/dL    Glucose 69 65 - 140 mg/dL    Calcium 9 6 8 3 - 10 1 mg/dL    AST 19 5 - 45 U/L    ALT 27 12 - 78 U/L    Alkaline Phosphatase 41 (L) 46 - 116 U/L    Total Protein 7 1 6 4 - 8 2 g/dL    Albumin 3 8 3 5 - 5 0 g/dL    Total Bilirubin 0 63 0 20 - 1 00 mg/dL    eGFR 101 ml/min/1 73sq m   Cancer antigen 27 29   Result Value Ref Range    CA 27 29 22 7 0 0 - 42 3 U/mL     Labs, Imaging, & Other studies:    All pertinent labs and imaging studies were personally reviewed    Lab Results   Component Value Date    K 4 2 08/29/2020     08/29/2020    CO2 30 08/29/2020    BUN 15 08/29/2020    CREATININE 0 67 08/29/2020    GLUF 87 01/07/2019    CALCIUM 9 6 08/29/2020    AST 19 08/29/2020    ALT 27 08/29/2020    ALKPHOS 41 (L) 08/29/2020    EGFR 101 08/29/2020     Lab Results   Component Value Date    WBC 6 74 08/29/2020    HGB 12 7 08/29/2020    HCT 39 0 08/29/2020    MCV 94 08/29/2020     08/29/2020       Reviewed and discussed with patient  Assessment and plan: Follow-up visit for hormone receptor positive, HER-2 negative, G1, T1c, 1 4 cm, stage I invasive mammary carcinoma in the left breast   Patient had lumpectomy and sentinel lymph node sampling in January 2017   Oncotype score was 10  BRCA test was negative  Since February 2017 she has been on tamoxifen  She received radiation  She is still premenopausal   Menstrual periods are becoming less frequent  She follows with her gynecologist   We talked about ovarian suppression or BSO because results are better when combined with tamoxifen or AI  She states she understands that  She does not want to do that  She wants to stay on tamoxifen and go into menopause  naturally   Occasionally bloated feeling in the lower abdomen  She had colonoscopy 2 years ago  She is being referred to colorectal surgeon  Has some anxiety       Physical examination and test results are as recorded and discussed  She remains in remission from breast cancer   She will continue to take tamoxifen      She goes to her breast surgeon for examination of breasts and imaging studies     She goes to her gynecologist   She is being referred to colorectal surgeon  Discussed the importance of self-breast examination, eating healthy foods, staying active and health screening tests  Patient is capable of self-care  Discussed precautions against coronavirus     1  Malignant neoplasm of upper-outer quadrant of left breast in female, estrogen receptor positive ([facility] [address] )    - CBC and differential; Future  - Comprehensive metabolic panel; Future  - Cancer antigen 27 29; Future    2  Abdominal bloating  - Ambulatory referral to Colorectal Surgery; Future                 Patient voiced understanding and agreement in the discussion  Counseling / Coordination of Care   Greater than 50% of total time was spent with the patient and / or family counseling and / or coordination of care

## 2020-12-10 ENCOUNTER — OFFICE VISIT (OUTPATIENT)
Dept: GYNECOLOGY | Facility: CLINIC | Age: 55
End: 2020-12-10
Payer: COMMERCIAL

## 2020-12-10 VITALS
DIASTOLIC BLOOD PRESSURE: 60 MMHG | WEIGHT: 127.8 LBS | HEIGHT: 66 IN | SYSTOLIC BLOOD PRESSURE: 108 MMHG | BODY MASS INDEX: 20.54 KG/M2

## 2020-12-10 DIAGNOSIS — Z80.3 FAMILY HISTORY OF BREAST CANCER IN SISTER: ICD-10-CM

## 2020-12-10 DIAGNOSIS — N81.6 RECTOCELE: ICD-10-CM

## 2020-12-10 DIAGNOSIS — D25.0 SUBMUCOUS LEIOMYOMA OF UTERUS: ICD-10-CM

## 2020-12-10 DIAGNOSIS — N81.11 CYSTOCELE, MIDLINE: ICD-10-CM

## 2020-12-10 DIAGNOSIS — Z85.3 HISTORY OF CANCER OF LEFT BREAST: ICD-10-CM

## 2020-12-10 DIAGNOSIS — N81.2 FIRST DEGREE UTERINE PROLAPSE: ICD-10-CM

## 2020-12-10 DIAGNOSIS — R10.2 PELVIC PRESSURE IN FEMALE: Primary | ICD-10-CM

## 2020-12-10 PROCEDURE — 99214 OFFICE O/P EST MOD 30 MIN: CPT | Performed by: OBSTETRICS & GYNECOLOGY

## 2021-01-26 ENCOUNTER — CLINICAL SUPPORT (OUTPATIENT)
Dept: RADIATION ONCOLOGY | Facility: CLINIC | Age: 56
End: 2021-01-26
Attending: RADIOLOGY
Payer: COMMERCIAL

## 2021-01-26 VITALS
RESPIRATION RATE: 18 BRPM | BODY MASS INDEX: 20.94 KG/M2 | TEMPERATURE: 99 F | DIASTOLIC BLOOD PRESSURE: 68 MMHG | SYSTOLIC BLOOD PRESSURE: 110 MMHG | HEIGHT: 66 IN | WEIGHT: 130.29 LBS | HEART RATE: 81 BPM

## 2021-01-26 DIAGNOSIS — C50.412 MALIGNANT NEOPLASM OF UPPER-OUTER QUADRANT OF LEFT BREAST IN FEMALE, ESTROGEN RECEPTOR POSITIVE (HCC): Primary | ICD-10-CM

## 2021-01-26 DIAGNOSIS — Z17.0 MALIGNANT NEOPLASM OF UPPER-OUTER QUADRANT OF LEFT BREAST IN FEMALE, ESTROGEN RECEPTOR POSITIVE (HCC): Primary | ICD-10-CM

## 2021-01-26 PROCEDURE — 99211 OFF/OP EST MAY X REQ PHY/QHP: CPT | Performed by: RADIOLOGY

## 2021-01-26 NOTE — PROGRESS NOTES
Aditi Jolley 1965 is a 50 y o  female       Follow up visit   Aditi Jolley is a 50year old female who is nearly 4 years post adjuvant radiation therapy for Stage IA  left breast carcinoma  She completed radiation to the left breast on 4/24/17  She was last seen 1/27/20 2/11/20 MRI breast bilateral w and wo contrast w cad  IMPRESSION:      Left breast:  1  Stable postsurgical changes  2  Small focus of enhancement at 3 o'clock, posterior depth is stable compared to the previous MRI  This is probably benign  Twelve month follow-up MRI is recommended to demonstrate 2 years of stability in size  Right breast:  No MRI evidence of malignancy  The patient is due for her next annual mammogram in August of 2020  ASSESSMENT/BI-RADS CATEGORY:  Left: 3 - Probably Benign  Right: 1 - Negative  Overall: 3 - Probably Benign     RECOMMENDATION:       - Diagnostic mammogram in August 2020       - bilateral breast MRI in 12 months       8/24/20 Mammo diagnostic bilateral w 3d & cad  IMPRESSION:   Stable benign findings      ASSESSMENT/BI-RADS CATEGORY:  Overall: 2 - Benign     RECOMMENDATION: Diagnostic mammogram in 1 year of the breast(s)  8/31/20 Dr Kathleen Conley follow up - continue Tamoxifen    3/1/21 Dr Quiroz [facility]          Oncology History   Malignant neoplasm of upper-outer quadrant of left breast in female, estrogen receptor positive ([facility] [address] )   11/2016 Initial Diagnosis    Malignant neoplasm of upper-outer quadrant of left breast in female, estrogen receptor positive ([facility] [address] )     11/17/2016 Biopsy    Left breast biopsy: invasive and in situ ductal carcinoma      1/3/2017 Surgery    Left lumpectomy and sentinel node biopsy  Final staging: Stage IA IDC, strongly ER/VT +, HER2-     2017 Genetic Testing    Oncotype score was 10  BRCA test was negative       2/2017 -  Hormone Therapy    Tamoxifen      3/13/2017 - 4/24/2017 Radiation    left breast to a dose of 5000 cGy followed by an additional 1000 cGy to the primary site         Clinical Trial: no      Health Maintenance   Topic Date Due    Pneumococcal Vaccine: Pediatrics (0 to 5 Years) and At-Risk Patients (6 to 59 Years) (1 of 1 - PPSV23) 06/24/1973    HIV Screening  06/24/1982    Annual Physical  06/24/1985    PT PLAN OF CARE  03/13/2019    Cervical Cancer Screening  05/01/2020    Influenza Vaccine (1) 09/01/2020    Depression Screening PHQ  01/27/2021    MAMMOGRAM  08/24/2021    BMI: Adult  12/10/2021    Colonoscopy Surveillance  04/23/2023    DTaP,Tdap,and Td Vaccines (2 - Td) 02/15/2026    Colorectal Cancer Screening  04/23/2028    HIB Vaccine  Aged Out    Hepatitis B Vaccine  Aged Out    IPV Vaccine  Aged Out    Hepatitis A Vaccine  Aged Out    Meningococcal ACWY Vaccine  Aged Out    HPV Vaccine  Aged Out       Patient Active Problem List   Diagnosis    Malignant neoplasm of upper-outer quadrant of left breast in female, estrogen receptor positive ([address] )    Right lower quadrant pain    Rectal pain    External hemorrhoid    Narrowing of stools    Vaginal discharge    Encntr for gyn exam (general) (routine) w abnormal findings    Vaginal relaxation    Routine screening for STI (sexually transmitted infection)    Use of tamoxifen (Nolvadex)    Inconclusive mammogram due to dense breasts    Varicose veins of right lower extremity with pain    Coccyx pain    Heartburn    Pelvic floor relaxation    Abdominal bloating    Abnormal uterine bleeding (AUB)     Past Medical History:   Diagnosis Date    Abdominal pain, left lower quadrant     Anxiety     BRCA negative     BRCA1 negative     BRCA2 negative     Common cold     Environmental and seasonal allergies     Exercises 5 to 6 times per week     Food allergy     scallops    History of radiation therapy 2017    Inconclusive mammogram due to dense breasts     Low back pain     Malignant neoplasm of upper-outer quadrant of left breast in female, estrogen receptor positive ([facility] [address] ) 2016    Seasonal allergies     Use of tamoxifen (Nolvadex)     Vaginal discharge     Vaginal irritation     Vulvar burning     Vulvar irritation      Past Surgical History:   Procedure Laterality Date    APPENDECTOMY      BREAST BIOPSY Left 11/17/2016    U/S BX    COLONOSCOPY      MAMMO NEEDLE LOCALIZATION LEFT (ALL INC) Left 1/3/2017    DC BIOPSY/EXCISION, LYMPH NODE(S) Left 1/3/2017    Procedure: BIOPSY LYMPH NODE SENTINEL @ [address];  Surgeon: Krunal Chinchilla MD;  [facility]: [facility];  Service: Surgical Oncology    DC MASTECTOMY, PARTIAL Left 1/3/2017    Procedure: LUMPECTOMY BREAST NEEDLE LOCALIZED @ 0930;  Surgeon: Krunal Chinchilla MD;  [facility];  Service: Surgical Oncology    SENTINEL LYMPH NODE BIOPSY      US GUIDED BREAST BIOPSY LEFT COMPLETE Left 11/17/2016     Family History   Problem Relation Age of Onset    Lung cancer Mother         mother age 61    Lung cancer Father         father age 66    Breast cancer Sister         age dx unk    Breast cancer Paternal Aunt         aunt 50    No Known Problems Brother     No Known Problems Daughter     No Known Problems Maternal Grandmother     No Known Problems Maternal Grandfather     No Known Problems Paternal Grandmother     No Known Problems Paternal Grandfather     No Known Problems Daughter     No Known Problems Maternal Aunt     No Known Problems Maternal Aunt     Colon cancer Neg Hx      Social History     Socioeconomic History    Marital status: /Civil Union     Spouse name: Not on file    Number of children: 2    Years of education: Not on file    Highest education level: Not on file   Occupational History    Occupation:    Social Needs    Financial resource strain: Not on file    Food insecurity     Worry: Not on file     Inability: Not on file    Transportation needs     Medical: Not on file     Non-medical: Not on file   Tobacco Use    Smoking status: Current Some Day Smoker     Types: Cigarettes    Smokeless tobacco: Never Used   Substance and Sexual Activity    Alcohol use: Yes     Frequency: Monthly or less     Drinks per session: 1 or 2     Binge frequency: Never    Drug use: No    Sexual activity: Yes     Partners: Male     Birth control/protection: None   Lifestyle    Physical activity     Days per week: Not on file     Minutes per session: Not on file    Stress: Not on file   Relationships    Social connections     Talks on phone: Not on file     Gets together: Not on file     Attends Mandaeism service: Not on file     Active member of club or organization: Not on file     Attends meetings of clubs or organizations: Not on file     Relationship status: Not on file    Intimate partner violence     Fear of current or ex partner: Not on file     Emotionally abused: Not on file     Physically abused: Not on file     Forced sexual activity: Not on file   Other Topics Concern    Not on file   Social History Narrative    Exercises 5-6 times per week    Full time employment    Lives with spouse    No caffeine use    2 children ages 25 and 21       Current Outpatient Medications:     ALPRAZolam (XANAX) 0 25 mg tablet, Take 0 25 mg by mouth daily as needed for anxiety, Disp: , Rfl:     azelastine (ASTELIN) 0 1 % nasal spray, INSTILL 2 SPRAYS INTO EACH NOSTRIL 2TIMES DAILY AS NEEDED FOR RHINITIS/ALLERGIES, Disp: 1 Bottle, Rfl: 1    Calcium 500 MG tablet, Take 1 tablet by mouth daily, Disp: , Rfl:     COLLAGEN PO, Take 1 tablet by mouth daily, Disp: , Rfl:     fexofenadine (ALLEGRA) 180 MG tablet, Take 1 tablet (180 mg total) by mouth daily, Disp: 90 tablet, Rfl: 2    Ibuprofen 200 MG CAPS, Take by mouth as needed, Disp: , Rfl:     MILK THISTLE PO, Take 1 tablet by mouth daily, Disp: , Rfl:     Multiple Vitamins-Minerals (MULTIVITAMIN ADULT PO), Take 1 tablet by mouth daily, Disp: , Rfl:     olopatadine HCl (PATADAY) 0 2 % opth drops, , Disp: , Rfl:     Probiotic Product (PROBIOTIC-10 PO), Take by mouth, Disp: , Rfl:     tamoxifen (NOLVADEX) 20 mg tablet, Take 1 tablet (20 mg total) by mouth daily, Disp: 90 tablet, Rfl: 2  Allergies   Allergen Reactions    Other Other (See Comments)     Scallops-pt states she has swelling and vomiting  Also seasonal        Review of Systems:  Review of Systems   Constitutional: Negative  HENT: Negative  Eyes: Negative  Respiratory: Negative  Cardiovascular: Negative  Gastrointestinal: Negative  Occasional bloating   Endocrine: Negative  Genitourinary: Negative  Musculoskeletal: Negative  Skin: Negative  Allergic/Immunologic: Negative  Neurological: Negative  Hematological: Negative  Psychiatric/Behavioral: Negative  Vitals:    01/26/21 0800   BP: 110/68   BP Location: Right arm   Patient Position: Sitting   Pulse: 81   Resp: 18   Temp: 99 °F (37 2 °C)   TempSrc: Temporal   Weight: 59 1 kg (130 lb 4 7 oz)   Height: 5' 6" (1 676 m)        Pain Score: 0-No pain      Imaging:No results found      Teaching

## 2021-01-26 NOTE — PROGRESS NOTES
Follow-up - Radiation Oncology   Katie Pugh 1965 50 y o  female [id number]      History of Present Illness   Cancer Staging  Malignant neoplasm of upper-outer quadrant of left breast in female, estrogen receptor positive ([facility] [address] )  Staging form: Breast, AJCC 7th Edition  - Clinical: Stage IA (T1c, N0, M0) - Signed by Rose Orozco MD on 5/7/2018  Laterality: Left  Histologic grade (G): G1  Estrogen receptor status: Positive  Progesterone receptor status: Positive  HER2 status: Negative  - Pathologic: Stage IA (T1c, N0, cM0) - Signed by Devin Hdez MD on 6/4/2018  Laterality: Left  Method of lymph node assessment: Cassandra lymph node biopsy  Histologic grade (G): G1  Estrogen receptor status: Positive  Percentage of positive estrogen receptors (%): 100  Progesterone receptor status: Positive  Percentage of positive progesterone receptors (%): 100  HER2 status: Negative  Multi-gene signature score: 10        Interval History:    Clarence Jacobo is I 21 year old female who is nearly 4 years post adjuvant radiation therapy for Stage IA  left breast carcinoma  She completed radiation to the left breast on 4/24/17  She was last seen 1/27/20 2/11/20 MRI breast bilateral w and wo contrast w cad  IMPRESSION:      Left breast:  1  Stable postsurgical changes    2  Small focus of enhancement at 3 o'clock, posterior depth is stable compared to the previous MRI   This is probably benign   Twelve month follow-up MRI is recommended to demonstrate 2 years of stability in size      Right breast:  No MRI evidence of malignancy      The patient is due for her next annual mammogram in August of 2020       ASSESSMENT/BI-RADS CATEGORY:  Left: 3 - Probably Benign  Right: 1 - Negative  Overall: 3 - Probably Benign     RECOMMENDATION:       - Diagnostic mammogram in August 2020       - bilateral breast MRI in 12 months         8/24/20 Mammo diagnostic bilateral w 3d & cad  IMPRESSION:   Stable benign findings      ASSESSMENT/BI-RADS CATEGORY:  Overall: 2 - Benign     RECOMMENDATION: Diagnostic mammogram in 1 year of the breast(s)      8/31/20 Dr Caron Martin follow up - continue Tamoxifen     3/1/21 Dr Stacy Montero   Oncology History   Malignant neoplasm of upper-outer quadrant of left breast in female, estrogen receptor positive ([facility] [address] )   11/2016 Initial Diagnosis    Malignant neoplasm of upper-outer quadrant of left breast in female, estrogen receptor positive ([facility] [address] )     11/17/2016 Biopsy    Left breast biopsy: invasive and in situ ductal carcinoma      1/3/2017 Surgery    Left lumpectomy and sentinel node biopsy  Final staging: Stage IA IDC, strongly ER/VA +, HER2-     2017 Genetic Testing    Oncotype score was 10  BRCA test was negative       2/2017 -  Hormone Therapy    Tamoxifen      3/13/2017 - 4/24/2017 Radiation    left breast to a dose of 5000 cGy followed by an additional 1000 cGy to the primary site         Past Medical History:   Diagnosis Date    Abdominal pain, left lower quadrant     Anxiety     BRCA negative     BRCA1 negative     BRCA2 negative     Common cold     Environmental and seasonal allergies     Exercises 5 to 6 times per week     Food allergy     scallops    History of radiation therapy 2017    Inconclusive mammogram due to dense breasts     Low back pain     Malignant neoplasm of upper-outer quadrant of left breast in female, estrogen receptor positive ([facility] [address] ) 2016    Seasonal allergies     Use of tamoxifen (Nolvadex)     Vaginal discharge     Vaginal irritation     Vulvar burning     Vulvar irritation      Past Surgical History:   Procedure Laterality Date    APPENDECTOMY      BREAST BIOPSY Left 11/17/2016    U/S BX    COLONOSCOPY      MAMMO NEEDLE LOCALIZATION LEFT (ALL INC) Left 1/3/2017    VA BIOPSY/EXCISION, LYMPH NODE(S) Left 1/3/2017    Procedure: BIOPSY LYMPH NODE SENTINEL @ [address];  Surgeon: Francisca Fleming Raquel Dave MD;  [facility];  Service: Surgical Oncology    AR MASTECTOMY, PARTIAL Left 1/3/2017    Procedure: LUMPECTOMY BREAST NEEDLE LOCALIZED @ 0930;  Surgeon: Joesph Lanier MD;  [facility];  Service: Surgical Oncology    SENTINEL LYMPH NODE BIOPSY      US GUIDED BREAST BIOPSY LEFT COMPLETE Left 11/17/2016       Social History   Social History     Substance and Sexual Activity   Alcohol Use Yes    Frequency: Monthly or less    Drinks per session: 1 or 2    Binge frequency: Never     Social History     Substance and Sexual Activity   Drug Use No     Social History     Tobacco Use   Smoking Status Current Some Day Smoker    Types: Cigarettes   Smokeless Tobacco Never Used         Meds/Allergies     Current Outpatient Medications:     ALPRAZolam (XANAX) 0 25 mg tablet, Take 0 25 mg by mouth daily as needed for anxiety, Disp: , Rfl:     azelastine (ASTELIN) 0 1 % nasal spray, INSTILL 2 SPRAYS INTO EACH NOSTRIL 2TIMES DAILY AS NEEDED FOR RHINITIS/ALLERGIES, Disp: 1 Bottle, Rfl: 1    Calcium 500 MG tablet, Take 1 tablet by mouth daily, Disp: , Rfl:     COLLAGEN PO, Take 1 tablet by mouth daily, Disp: , Rfl:     fexofenadine (ALLEGRA) 180 MG tablet, Take 1 tablet (180 mg total) by mouth daily, Disp: 90 tablet, Rfl: 2    Ibuprofen 200 MG CAPS, Take by mouth as needed, Disp: , Rfl:     MILK THISTLE PO, Take 1 tablet by mouth daily, Disp: , Rfl:     Multiple Vitamins-Minerals (MULTIVITAMIN ADULT PO), Take 1 tablet by mouth daily, Disp: , Rfl:     olopatadine HCl (PATADAY) 0 2 % opth drops, , Disp: , Rfl:     Probiotic Product (PROBIOTIC-10 PO), Take by mouth, Disp: , Rfl:     tamoxifen (NOLVADEX) 20 mg tablet, Take 1 tablet (20 mg total) by mouth daily, Disp: 90 tablet, Rfl: 2  Allergies   Allergen Reactions    Other Other (See Comments)     Scallops-pt states she has swelling and vomiting  Also seasonal          Review of Systems   Constitutional: Negative  HENT: Negative     Eyes: Negative  Respiratory: Negative  Cardiovascular: Negative  Gastrointestinal: Negative  Occasional bloating   Endocrine: Negative  Genitourinary: Negative  Musculoskeletal: Negative  Skin: Negative  Allergic/Immunologic: Negative  Neurological: Negative  Hematological: Negative  Psychiatric/Behavioral: Negative  OBJECTIVE:   /68 (BP Location: Right arm, Patient Position: Sitting)   Pulse 81   Temp 99 °F (37 2 °C) (Temporal)   Resp 18   Ht 5' 6" (1 676 m)   Wt 59 1 kg (130 lb 4 7 oz)   BMI 21 03 kg/m²   Pain Assessment:  0  ECOG/Zubrod/WHO: 0 - Asymptomatic    Physical Exam  Constitutional:       General: She is not in acute distress  Appearance: She is well-developed  She is not diaphoretic  HENT:      Head: Normocephalic and atraumatic  Mouth/Throat:      Pharynx: No oropharyngeal exudate  Eyes:      General: No scleral icterus  Conjunctiva/sclera: Conjunctivae normal       Pupils: Pupils are equal, round, and reactive to light  Neck:      Musculoskeletal: Normal range of motion and neck supple  Thyroid: No thyromegaly  Trachea: No tracheal deviation  Cardiovascular:      Rate and Rhythm: Normal rate and regular rhythm  Heart sounds: Normal heart sounds  Pulmonary:      Effort: Pulmonary effort is normal  No respiratory distress  Breath sounds: Normal breath sounds  No wheezing or rales  Chest:      Comments: The skin in the radiated field is in good condition  There is no supraclavicular axillary adenopathy palpable  She has good symmetry between her breast   No suspicious lesions palpable in either breast no breast or arm edema  Abdominal:      General: Bowel sounds are normal  There is no distension  Palpations: Abdomen is soft  There is no mass  Tenderness: There is no abdominal tenderness  Musculoskeletal: Normal range of motion  General: No tenderness     Lymphadenopathy:      Cervical: No cervical adenopathy  Skin:     General: Skin is warm and dry  Coloration: Skin is not pale  Findings: No erythema or rash  Neurological:      Mental Status: She is alert and oriented to person, place, and time  Cranial Nerves: No cranial nerve deficit  Coordination: Coordination normal    Psychiatric:         Behavior: Behavior normal          Thought Content: Thought content normal          Judgment: Judgment normal                 RESULTS    Lab Results: No results found for this or any previous visit (from the past 672 hour(s))  Imaging Studies:No results found  Assessment/Plan:  No orders of the defined types were placed in this encounter  Roshan Irwin is a 50y o  year old female who is nearly 4 years post adjuvant radiation therapy for left breast carcinoma  She has no clinical evidence of recurrence  She underwent breast MRI 1 year ago and mammogram in August   She has follow-up with Dr Romana Herter and remains on tamoxifen  She will return for follow-up visit in 1 year  Liv Christensen MD  1/26/2021,8:32 AM    Portions of the record may have been created with voice recognition software   Occasional wrong word or "sound a like" substitutions may have occurred due to the inherent limitations of voice recognition software   Read the chart carefully and recognize, using context, where substitutions have occurred

## 2021-02-07 NOTE — PROGRESS NOTES
Assessment/Plan:    Calcium 1200-1500mg + 600-1000 IU Vit D daily  Exercise 150 minutes per week minimum including weight bearing exercises  Pap with high risk HPV Q 5 years-due 2022  Annual mammogram-due 8/21 and breast MRI due now and ordered by Dr Patricia Peñaloza  Continue with monthly breast self exam recommended  Colonoscopy-done 2018  Kegels 20 times twice daily  Silicone based lubricant with sex  Vaginal moisturizers twice weekly as needed  Referred to pelvic floor PT  Diagnoses and all orders for this visit:    Encntr for gyn exam (general) (routine) w abnormal findings    Vaginal relaxation  -     Ambulatory referral to Physical Therapy; Future    Rectocele  -     Ambulatory referral to Physical Therapy; Future          Subjective:      Patient ID: Martinez Canseco is a 50 y o  female  Martinez Canseco is a 50 y o  female who is here today for her annual visit  She feels a bulge in her vagina  Questions about her rectocele and cystocele  LMP 2 months ago  No vaginal bleeding since  Menses Q 3 months x 5 days with mod  flow  Menses is acceptable  Martinez Canseco is sexually active with male partner/  of 25 years  She was diagnosed with breast cancer in January of 2017 and had a left lumpectomy followed by radiation  She did not require chemotherapy  Stable breast MRI on 2/20 and stable mammogram 8/20  She had a colonoscopy in 2018 which was normal  Pelvic US on 5/20 for AUB showed : Enlarging submucosal fibroid  Stable subendometrial cyst   This may represent a degenerated fibroid versus the sequela of adenomyosis  Normal pap with negative HR HPV  5/17  The following portions of the patient's history were reviewed and updated as appropriate: allergies, current medications, past family history, past medical history, past social history, past surgical history and problem list     Review of Systems   Constitutional: Negative    Negative for activity change, appetite change, chills, diaphoresis, fatigue, fever and unexpected weight change  HENT: Negative for congestion, dental problem, sneezing, sore throat and trouble swallowing  Eyes: Negative for visual disturbance  Respiratory: Negative for chest tightness and shortness of breath  Cardiovascular: Negative for chest pain and leg swelling  Gastrointestinal: Negative for abdominal pain, constipation, diarrhea, nausea and vomiting  Genitourinary: Positive for menstrual problem  Negative for difficulty urinating, dyspareunia, dysuria, frequency, hematuria, pelvic pain, urgency, vaginal bleeding, vaginal discharge and vaginal pain  Musculoskeletal: Negative for back pain and neck pain  Skin: Negative  Allergic/Immunologic: Negative  Neurological: Negative for weakness and headaches  Hematological: Negative for adenopathy  Psychiatric/Behavioral: Negative  Objective:      /80 (BP Location: Left arm, Patient Position: Sitting, Cuff Size: Standard)   Ht 5' 7" (1 702 m)   Wt 59 4 kg (131 lb)   LMP 12/08/2020 (Approximate)   BMI 20 52 kg/m²          Physical Exam  Vitals signs and nursing note reviewed  Constitutional:       Appearance: Normal appearance  She is well-developed  HENT:      Head: Normocephalic and atraumatic  Eyes:      General:         Right eye: No discharge  Left eye: No discharge  Neck:      Musculoskeletal: Normal range of motion and neck supple  Thyroid: No thyromegaly  Trachea: Trachea normal    Cardiovascular:      Rate and Rhythm: Normal rate and regular rhythm  Heart sounds: Normal heart sounds  Pulmonary:      Effort: Pulmonary effort is normal       Breath sounds: Normal breath sounds  Chest:      Breasts: Breasts are symmetrical          Right: Normal  No inverted nipple, mass, nipple discharge, skin change or tenderness  Left: Normal  No inverted nipple, mass, nipple discharge, skin change or tenderness  Abdominal:      General: Abdomen is flat  Palpations: Abdomen is soft  Genitourinary:     General: Normal vulva  Exam position: Supine  Labia:         Right: No rash, tenderness, lesion or injury  Left: No rash, tenderness, lesion or injury  Urethra: No prolapse, urethral pain, urethral swelling or urethral lesion  Vagina: No signs of injury and foreign body  Prolapsed vaginal walls (1st degree) present  No vaginal discharge, erythema, tenderness or bleeding  Cervix: Normal       Uterus: Normal        Adnexa: Right adnexa normal and left adnexa normal         Right: No mass, tenderness or fullness  Left: No mass, tenderness or fullness  Rectum: No external hemorrhoid  Comments: Rectocele, midline cyctocele  Musculoskeletal: Normal range of motion  Lymphadenopathy:      Head:      Right side of head: No submental, submandibular or tonsillar adenopathy  Left side of head: No submental, submandibular or tonsillar adenopathy  Cervical: No cervical adenopathy  Upper Body:      Right upper body: No supraclavicular adenopathy  Left upper body: No supraclavicular adenopathy  Lower Body: No right inguinal adenopathy  No left inguinal adenopathy  Skin:     General: Skin is warm and dry  Neurological:      Mental Status: She is alert and oriented to person, place, and time

## 2021-02-08 ENCOUNTER — ANNUAL EXAM (OUTPATIENT)
Dept: OBGYN CLINIC | Facility: CLINIC | Age: 56
End: 2021-02-08
Payer: COMMERCIAL

## 2021-02-08 VITALS
HEIGHT: 67 IN | DIASTOLIC BLOOD PRESSURE: 80 MMHG | SYSTOLIC BLOOD PRESSURE: 124 MMHG | WEIGHT: 131 LBS | BODY MASS INDEX: 20.56 KG/M2

## 2021-02-08 DIAGNOSIS — N81.6 RECTOCELE: ICD-10-CM

## 2021-02-08 DIAGNOSIS — N81.89 VAGINAL RELAXATION: ICD-10-CM

## 2021-02-08 DIAGNOSIS — Z01.411 ENCNTR FOR GYN EXAM (GENERAL) (ROUTINE) W ABNORMAL FINDINGS: Primary | ICD-10-CM

## 2021-02-08 PROCEDURE — 99396 PREV VISIT EST AGE 40-64: CPT | Performed by: NURSE PRACTITIONER

## 2021-02-08 NOTE — PATIENT INSTRUCTIONS
Calcium 1200-1500mg + 600-1000 IU Vit D daily  Exercise 150 minutes per week minimum including weight bearing exercises  Pap with high risk HPV Q 5 years-due 2022  Annual mammogram-due 8/21 and breast MRI due now and ordered by Dr Carlos Limon  Continue with monthly breast self exam recommended  Colonoscopy-done 2018  Kegels 20 times twice daily  Silicone based lubricant with sex  Vaginal moisturizers twice weekly as needed   Referred to pelvic floor PT

## 2021-02-26 ENCOUNTER — LAB (OUTPATIENT)
Dept: LAB | Facility: HOSPITAL | Age: 56
End: 2021-02-26
Attending: INTERNAL MEDICINE
Payer: COMMERCIAL

## 2021-02-26 DIAGNOSIS — Z13.220 LIPID SCREENING: Primary | ICD-10-CM

## 2021-02-26 DIAGNOSIS — Z17.0 MALIGNANT NEOPLASM OF UPPER-OUTER QUADRANT OF LEFT BREAST IN FEMALE, ESTROGEN RECEPTOR POSITIVE (HCC): ICD-10-CM

## 2021-02-26 DIAGNOSIS — C50.412 MALIGNANT NEOPLASM OF UPPER-OUTER QUADRANT OF LEFT BREAST IN FEMALE, ESTROGEN RECEPTOR POSITIVE (HCC): ICD-10-CM

## 2021-02-26 LAB
ALBUMIN SERPL BCP-MCNC: 4.1 G/DL (ref 3.5–5)
ALP SERPL-CCNC: 65 U/L (ref 46–116)
ALT SERPL W P-5'-P-CCNC: 28 U/L (ref 12–78)
ANION GAP SERPL CALCULATED.3IONS-SCNC: 2 MMOL/L (ref 4–13)
AST SERPL W P-5'-P-CCNC: 22 U/L (ref 5–45)
BASOPHILS # BLD AUTO: 0.06 THOUSANDS/ΜL (ref 0–0.1)
BASOPHILS NFR BLD AUTO: 1 % (ref 0–1)
BILIRUB SERPL-MCNC: 0.68 MG/DL (ref 0.2–1)
BUN SERPL-MCNC: 15 MG/DL (ref 5–25)
CALCIUM SERPL-MCNC: 10.1 MG/DL (ref 8.3–10.1)
CANCER AG27-29 SERPL-ACNC: 16 U/ML (ref 0–42.3)
CHLORIDE SERPL-SCNC: 106 MMOL/L (ref 100–108)
CHOLEST SERPL-MCNC: 225 MG/DL (ref 50–200)
CO2 SERPL-SCNC: 32 MMOL/L (ref 21–32)
CREAT SERPL-MCNC: 0.69 MG/DL (ref 0.6–1.3)
EOSINOPHIL # BLD AUTO: 0.22 THOUSAND/ΜL (ref 0–0.61)
EOSINOPHIL NFR BLD AUTO: 5 % (ref 0–6)
ERYTHROCYTE [DISTWIDTH] IN BLOOD BY AUTOMATED COUNT: 13 % (ref 11.6–15.1)
GFR SERPL CREATININE-BSD FRML MDRD: 100 ML/MIN/1.73SQ M
GLUCOSE P FAST SERPL-MCNC: 78 MG/DL (ref 65–99)
HCT VFR BLD AUTO: 42.4 % (ref 34.8–46.1)
HDLC SERPL-MCNC: 110 MG/DL
HGB BLD-MCNC: 13.8 G/DL (ref 11.5–15.4)
IMM GRANULOCYTES # BLD AUTO: 0.02 THOUSAND/UL (ref 0–0.2)
IMM GRANULOCYTES NFR BLD AUTO: 0 % (ref 0–2)
LDLC SERPL CALC-MCNC: 104 MG/DL (ref 0–100)
LYMPHOCYTES # BLD AUTO: 1.33 THOUSANDS/ΜL (ref 0.6–4.47)
LYMPHOCYTES NFR BLD AUTO: 28 % (ref 14–44)
MCH RBC QN AUTO: 30.3 PG (ref 26.8–34.3)
MCHC RBC AUTO-ENTMCNC: 32.5 G/DL (ref 31.4–37.4)
MCV RBC AUTO: 93 FL (ref 82–98)
MONOCYTES # BLD AUTO: 0.57 THOUSAND/ΜL (ref 0.17–1.22)
MONOCYTES NFR BLD AUTO: 12 % (ref 4–12)
NEUTROPHILS # BLD AUTO: 2.5 THOUSANDS/ΜL (ref 1.85–7.62)
NEUTS SEG NFR BLD AUTO: 54 % (ref 43–75)
NONHDLC SERPL-MCNC: 115 MG/DL
NRBC BLD AUTO-RTO: 0 /100 WBCS
PLATELET # BLD AUTO: 245 THOUSANDS/UL (ref 149–390)
PMV BLD AUTO: 11.7 FL (ref 8.9–12.7)
POTASSIUM SERPL-SCNC: 4.3 MMOL/L (ref 3.5–5.3)
PROT SERPL-MCNC: 7.6 G/DL (ref 6.4–8.2)
RBC # BLD AUTO: 4.55 MILLION/UL (ref 3.81–5.12)
SODIUM SERPL-SCNC: 140 MMOL/L (ref 136–145)
TRIGL SERPL-MCNC: 56 MG/DL
WBC # BLD AUTO: 4.7 THOUSAND/UL (ref 4.31–10.16)

## 2021-02-26 PROCEDURE — 36415 COLL VENOUS BLD VENIPUNCTURE: CPT

## 2021-02-26 PROCEDURE — 85025 COMPLETE CBC W/AUTO DIFF WBC: CPT

## 2021-02-26 PROCEDURE — 86300 IMMUNOASSAY TUMOR CA 15-3: CPT

## 2021-02-26 PROCEDURE — 80061 LIPID PANEL: CPT

## 2021-02-26 PROCEDURE — 80053 COMPREHEN METABOLIC PANEL: CPT

## 2021-03-01 ENCOUNTER — OFFICE VISIT (OUTPATIENT)
Dept: HEMATOLOGY ONCOLOGY | Facility: CLINIC | Age: 56
End: 2021-03-01
Payer: COMMERCIAL

## 2021-03-01 VITALS
BODY MASS INDEX: 20.56 KG/M2 | SYSTOLIC BLOOD PRESSURE: 118 MMHG | WEIGHT: 131 LBS | DIASTOLIC BLOOD PRESSURE: 68 MMHG | HEIGHT: 67 IN | RESPIRATION RATE: 18 BRPM | HEART RATE: 61 BPM | TEMPERATURE: 98.9 F | OXYGEN SATURATION: 94 %

## 2021-03-01 DIAGNOSIS — C50.412 MALIGNANT NEOPLASM OF UPPER-OUTER QUADRANT OF LEFT BREAST IN FEMALE, ESTROGEN RECEPTOR POSITIVE (HCC): Primary | ICD-10-CM

## 2021-03-01 DIAGNOSIS — Z17.0 MALIGNANT NEOPLASM OF UPPER-OUTER QUADRANT OF LEFT BREAST IN FEMALE, ESTROGEN RECEPTOR POSITIVE (HCC): Primary | ICD-10-CM

## 2021-03-01 DIAGNOSIS — Z78.0 MENOPAUSE: ICD-10-CM

## 2021-03-01 PROCEDURE — 99214 OFFICE O/P EST MOD 30 MIN: CPT | Performed by: INTERNAL MEDICINE

## 2021-03-01 NOTE — PATIENT INSTRUCTIONS
No change in therapy  Next time she will have blood drawn for blood counts, chemistry, CA 27 -29 and estradiol    Follow-up in 6 months

## 2021-03-01 NOTE — PROGRESS NOTES
HPI: In January 2017 patient had left breast lumpectomy and sentinel lymph node sampling for  hormone receptor positive, HER-2 negative, G1, T1c, 1 4 cm, stage I invasive mammary carcinoma in the left breast  Oncotype score was 10  BRCA test was negative  She received radiation  Since February 2017 she has been on tamoxifen  She is still premenopausal   Menstrual periods are becoming less frequent like once every 3 months or so  She follows with her gynecologist   We talked about ovarian suppression or BSO because results are better when combined with tamoxifen or AI  She states she understands that  She does not want to do that  She wants to stay on tamoxifen and go into menopause  naturally   Joseline Vail No more GI symptoms   Has some anxiety                       Current Outpatient Medications:     ALPRAZolam (XANAX) 0 25 mg tablet, Take 0 25 mg by mouth daily as needed for anxiety, Disp: , Rfl:     azelastine (ASTELIN) 0 1 % nasal spray, INSTILL 2 SPRAYS INTO EACH NOSTRIL 2TIMES DAILY AS NEEDED FOR RHINITIS/ALLERGIES, Disp: 1 Bottle, Rfl: 1    Calcium 500 MG tablet, Take 1 tablet by mouth daily, Disp: , Rfl:     COLLAGEN PO, Take 1 tablet by mouth daily, Disp: , Rfl:     fexofenadine (ALLEGRA) 180 MG tablet, Take 1 tablet (180 mg total) by mouth daily, Disp: 90 tablet, Rfl: 2    Ibuprofen 200 MG CAPS, Take by mouth as needed, Disp: , Rfl:     MILK THISTLE PO, Take 1 tablet by mouth daily, Disp: , Rfl:     Multiple Vitamins-Minerals (MULTIVITAMIN ADULT PO), Take 1 tablet by mouth daily, Disp: , Rfl:     olopatadine HCl (PATADAY) 0 2 % opth drops, , Disp: , Rfl:     Probiotic Product (PROBIOTIC-10 PO), Take by mouth, Disp: , Rfl:     tamoxifen (NOLVADEX) 20 mg tablet, Take 1 tablet (20 mg total) by mouth daily, Disp: 90 tablet, Rfl: 2    Allergies   Allergen Reactions    Other Other (See Comments)     Scallops-pt states she has swelling and vomiting  Also seasonal        Oncology History   Malignant neoplasm of upper-outer quadrant of left breast in female, estrogen receptor positive ([facility] [address] )   11/2016 Initial Diagnosis    Malignant neoplasm of upper-outer quadrant of left breast in female, estrogen receptor positive ([facility] [address] )     11/17/2016 Biopsy    Left breast biopsy: invasive and in situ ductal carcinoma      1/3/2017 Surgery    Left lumpectomy and sentinel node biopsy  Final staging: Stage IA IDC, strongly ER/DE +, HER2-     2017 Genetic Testing    Oncotype score was 10  BRCA test was negative  2/2017 -  Hormone Therapy    Tamoxifen      3/13/2017 - 4/24/2017 Radiation    left breast to a dose of 5000 cGy followed by an additional 1000 cGy to the primary site         ROS:  03/01/21 Reviewed 12 systems: See symptoms in HPI:   Presently no  neurological, cardiac, pulmonary, GI and  symptoms  Symptoms are in HPI  No fever, chills, bleeding, bone pains, skin rash, weight loss, arthritic symptoms, tiredness ,  weakness, numbness,  claudication and gait problem  No frequent infections  Not unusually sensitive to heat or cold  No swelling of the ankles  No swollen glands  Patient is anxious  /68 (BP Location: Left arm, Patient Position: Sitting, Cuff Size: Adult)   Pulse 61   Temp 98 9 °F (37 2 °C)   Resp 18   Ht 5' 7" (1 702 m)   Wt 59 4 kg (131 lb)   SpO2 94%   BMI 20 52 kg/m²     Physical Exam[de-identified]   My medical assistant Olya Ralph was in the room with me during examination    Patient is alert and oriented  She is not in distress  Stable vital signs  No icterus, no oral thrush, no palpable neck mass, clear lung fields, regular heart rate, abdomen  soft and non tender, no palpable abdominal mass, no ascites, no edema of ankles, no calf tenderness, no focal neurological deficit, no skin rash, no palpable lymphadenopathy in the neck and axillary areas, good arterial pulses, no clubbing  Patient is anxious  Performance status 0      IMAGING:     IMPRESSION:   Stable benign findings           ASSESSMENT/BI-RADS CATEGORY:  Overall: 2 - Benign     RECOMMENDATION:       - Diagnostic mammogram in 1 year of the breast(s)      Workstation ID: [id number]   Imaging    Mammo diagnostic bilateral w 3d & cad (Order: [id number]) - 8/24/2020      LABS:    Results for orders placed or performed in visit on 02/26/21   CBC and differential   Result Value Ref Range    WBC 4 70 4 31 - 10 16 Thousand/uL    RBC 4 55 3 81 - 5 12 Million/uL    Hemoglobin 13 8 11 5 - 15 4 g/dL    Hematocrit 42 4 34 8 - 46 1 %    MCV 93 82 - 98 fL    MCH 30 3 26 8 - 34 3 pg    MCHC 32 5 31 4 - 37 4 g/dL    RDW 13 0 11 6 - 15 1 %    MPV 11 7 8 9 - 12 7 fL    Platelets 211 522 - 344 Thousands/uL    nRBC 0 /100 WBCs    Neutrophils Relative 54 43 - 75 %    Immat GRANS % 0 0 - 2 %    Lymphocytes Relative 28 14 - 44 %    Monocytes Relative 12 4 - 12 %    Eosinophils Relative 5 0 - 6 %    Basophils Relative 1 0 - 1 %    Neutrophils Absolute 2 50 1 85 - 7 62 Thousands/µL    Immature Grans Absolute 0 02 0 00 - 0 20 Thousand/uL    Lymphocytes Absolute 1 33 0 60 - 4 47 Thousands/µL    Monocytes Absolute 0 57 0 17 - 1 22 Thousand/µL    Eosinophils Absolute 0 22 0 00 - 0 61 Thousand/µL    Basophils Absolute 0 06 0 00 - 0 10 Thousands/µL   Comprehensive metabolic panel   Result Value Ref Range    Sodium 140 136 - 145 mmol/L    Potassium 4 3 3 5 - 5 3 mmol/L    Chloride 106 100 - 108 mmol/L    CO2 32 21 - 32 mmol/L    ANION GAP 2 (L) 4 - 13 mmol/L    BUN 15 5 - 25 mg/dL    Creatinine 0 69 0 60 - 1 30 mg/dL    Glucose, Fasting 78 65 - 99 mg/dL    Calcium 10 1 8 3 - 10 1 mg/dL    AST 22 5 - 45 U/L    ALT 28 12 - 78 U/L    Alkaline Phosphatase 65 46 - 116 U/L    Total Protein 7 6 6 4 - 8 2 g/dL    Albumin 4 1 3 5 - 5 0 g/dL    Total Bilirubin 0 68 0 20 - 1 00 mg/dL    eGFR 100 ml/min/1 73sq m   Cancer antigen 27 29   Result Value Ref Range    CA 27 29 16 0 0 0 - 42 3 U/mL   Lipid panel   Result Value Ref Range    Cholesterol 225 (H) 50 - 200 mg/dL    Triglycerides 56 <=150 mg/dL    HDL, Direct 110 >=40 mg/dL    LDL Calculated 104 (H) 0 - 100 mg/dL    Non-HDL-Chol (CHOL-HDL) 115 mg/dl     Labs, Imaging, & Other studies:   All pertinent labs and imaging studies were personally reviewed    Lab Results   Component Value Date    K 4 3 02/26/2021     02/26/2021    CO2 32 02/26/2021    BUN 15 02/26/2021    CREATININE 0 69 02/26/2021    GLUF 78 02/26/2021    CALCIUM 10 1 02/26/2021    AST 22 02/26/2021    ALT 28 02/26/2021    ALKPHOS 65 02/26/2021    EGFR 100 02/26/2021     Lab Results   Component Value Date    WBC 4 70 02/26/2021    HGB 13 8 02/26/2021    HCT 42 4 02/26/2021    MCV 93 02/26/2021     02/26/2021       Reviewed CBC, CMP, CA 27-29, lipid profile and mammography and discussed with patient  Assessment and plan: Gurdeep Serrato In January 2017 patient had left breast lumpectomy and sentinel lymph node sampling for  hormone receptor positive, HER-2 negative, G1, T1c, 1 4 cm, stage I invasive mammary carcinoma in the left breast  Oncotype score was 10  BRCA test was negative  She received radiation  Since February 2017 she has been on tamoxifen  She is still premenopausal   Menstrual periods are becoming less frequent like once every 3 months or so  She follows with her gynecologist   We talked about ovarian suppression or BSO because results are better when combined with tamoxifen or AI  She states she understands that  She does not want to do that  She wants to stay on tamoxifen and go into menopause  naturally   Gurdeep Serrato No more GI symptoms   Has some anxiety    Jayla Flash examination and test results are as recorded and discussed  Reviewed CBC, CMP, CA 27-29, lipid profile and mammography and discussed with patient  She remains in remission from breast cancer   She will continue to take tamoxifen      She goes to her breast surgeon for examination of breasts and imaging studies      She goes to her gynecologist      Discussed the importance of self-breast examination, eating healthy foods, staying active and health screening tests  Patient is capable of self-care  Discussed precautions against coronavirus         1  Malignant neoplasm of upper-outer quadrant of left breast in female, estrogen receptor positive (HCC)    - CBC and differential; Future  - Comprehensive metabolic panel; Future  - Cancer antigen 27 29; Future  - Estradiol; Future    2  Menopause    - Estradiol; Future        No change in therapy  Next time she will have blood drawn for blood counts, chemistry, CA 27 -29 and estradiol  Follow-up in 6 months      Patient voiced understanding and agreement in the discussion  Counseling / Coordination of Care      Provided counseling and support

## 2021-03-23 NOTE — PROGRESS NOTES
PT Evaluation     Today's date: 3/25/2021  Patient name: Margo Muniz  : 1965  MRN: [id number]  Referring provider: LUCIANA Chappell  Dx:   Encounter Diagnosis     ICD-10-CM    1  Pelvic floor weakness in female  N81 89    2  Vaginal relaxation  N81 89 Ambulatory referral to Physical Therapy   3  Rectocele  N81 6 Ambulatory referral to Physical Therapy                  Assessment  Assessment details: Margo Muniz is a 50 y o  female who presents with concerns of difficulty evacuating stool and abdominal fullness if she doesn't go to the bathroom  Patient presents with the below outlined deficits and is appropriate for skilled physical therapy in order to address deficits and ultimately meet goal of independent self management of condition  Therapeutic activities performed upon examination included education regarding pelvic floor anatomy, explanation of exam technique, explanation of exam findings and discussion of treatment plan as well as expectations of the patient to emphasize the importance of compliance and adherence to physical therapy visits  Impairments: abnormal muscle firing, activity intolerance and lacks appropriate home exercise program  Understanding of Dx/Px/POC: good   Prognosis: good    Goals  STGs to be met in 3 weeks:  * Patient will be compliant with introductory HEP as prescribed  * Patient presents with good understanding of pelvic floor protective strategies to reduce intra-abdominal pressure against pelvic organs  * Patient will demonstrate and report good postural techniques with toileting  LTGs to be met by discharge:  * Patient will present with a  60% improvement on FOTO score by discharge to indicate improved symptom management  * Normalize sEMG findings to indicate strength average > 12uV and resting average < 2 0uV  * Demonstrates correct isolation and relaxation of pelvic floor to palpation without overflow from global stabilizers    * Patient will use pelvic floor muscles correctly during functional ADLs such as coughing, sneezing, lifting and exercise activities to avoid excessive IAP and PFM strain  * Patient will be compliant with comprehensive home exercise program for self management of condition  Plan  Patient would benefit from: skilled physical therapy  Referral necessary: No  Planned modality interventions: biofeedback  Planned therapy interventions: manual therapy, neuromuscular re-education, patient education, strengthening, stretching, therapeutic activities, therapeutic exercise and home exercise program  Frequency: 1x week  Duration in weeks: 12  Plan of Care beginning date: 3/25/2021  Plan of Care expiration date: 6/17/2021  Treatment plan discussed with: patient        PT Pelvic Floor Subjective:   History of Present Illness:   Patient reports that she has felt issues with bloating and firmness in her abdomen  She reports that she doesn't have a BM unless she drinks coffee or aloe juice and feels as though it is difficult to pass  She strains regularly           Recurrent probem    Quality of life: good    Social Support:     Lives with:  Spouse    Relationship status: /committed    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)    Life stress level: 4  Diet and Exercise:    Diet:balanced nutrition    Biking, Elliptical ,Walking dog   Exercises -about 20-30 minutes daily  Co-morbidities:    None  OB/ gyn History    Gestational History:     Prior Pregnancy: Yes      Number of prior pregnancies: 2    Number of term pregnancies: 2    Delivery Type: vaginal delivery      Delivery Complications:  Ages 27 and 26 yo    Menstrual History:    Date of last menstrual period: 12/20/2020    Menstrual irregularities irregular menses (every few months)  Bladder Function:     Voiding Difficulties comments:     Voiding frequency: every 1-2 hours and every 3-4 hours    Urinary leakage: no urine leakage    Nocturia (episodes per night): 0 and 1    Painful urination: No      Intake (ounces): Water intake (oz): 5 water bottles  Bowel Function:     Voiding DIfficulties: unfinished feeling after defecating      Bowel frequency: daily    [place] Stool Scale: type 3 and type 2  Sexual Function:     Sexually Active:  Sexually active    Pain during intercourse: No    Pain:     Current pain ratin    Location: Occ midline low back pain    Onset:  4-6 months ago    Quality:  Dull ache    Aggravating factors:  Unknown    Progression:  No change  Diagnostic Tests:     None    Treatments:     None    Patient Goals:     Patient goals for therapy:  Improved quality of life, relaxation, improved comfort, improved bladder or bowel function, fully empty bladder or bowels and improved pain management      Objective   Pelvic Floor Exam   Position: supine exam    General Perineum Exam:   perineum intact     Positive for no pelvic organ prolapse at rest      General perineum exam comments: No discharge, irritation or skin breakdown evident      Visual Inspection of Perineum:   Excursion of perineal body in cephalad direction with contraction of pelvic floor muscles (PFM): fair   Excursion of perineal body in caudal direction with relaxation of pelvic floor muscles (PFM): fair   Involuntary contraction with coughing: yes  Involuntary relaxation with bearing down: yes  Cotton swab test: non-tender  Cough reflex: cough reflex  Sphincter Tone Resting: normal  Sphincter Tone Squeeze: normal  Sensation: intact    Pelvic Organ Prolapse   Position: hook-lying  At rest: none  With bearing down: mild (>1cm from hymenal remnants)  Location: posterior    Pelvic Floor Muscle Exam     Muscle Contraction: overflow  Breathing pattern with contraction: holding breath    PERFECT Score   Power right: 2/5  Power left: 2/5  Endurance (seconds to max): 4  Repetitions (before fatigue): 4    SMEG Biofeedback   to be assessed next treatment               Precautions: h/o L breast CA      Manuals 3/25                                                                Neuro Re-Ed             NMR via BF nv                                                                                          Ther Ex                                                                                                                     Ther Activity             Education anatomy and POC x10'                         Gait Training                                       Modalities

## 2021-03-25 ENCOUNTER — EVALUATION (OUTPATIENT)
Dept: PHYSICAL THERAPY | Facility: REHABILITATION | Age: 56
End: 2021-03-25
Payer: COMMERCIAL

## 2021-03-25 DIAGNOSIS — N81.89 PELVIC FLOOR WEAKNESS IN FEMALE: Primary | ICD-10-CM

## 2021-03-25 DIAGNOSIS — N81.6 RECTOCELE: ICD-10-CM

## 2021-03-25 DIAGNOSIS — N81.89 VAGINAL RELAXATION: ICD-10-CM

## 2021-03-25 PROCEDURE — 97162 PT EVAL MOD COMPLEX 30 MIN: CPT | Performed by: PHYSICAL THERAPIST

## 2021-03-25 PROCEDURE — 97530 THERAPEUTIC ACTIVITIES: CPT | Performed by: PHYSICAL THERAPIST

## 2021-03-29 NOTE — PROGRESS NOTES
Daily Note     Today's date: 3/30/2021  Patient name: Terrance Knox  : 1965  MRN: [id number]  Referring provider: LUCIANA Patel  Dx:   Encounter Diagnosis     ICD-10-CM    1  Vaginal relaxation  N81 89    2  Rectocele  N81 6    3  Pelvic floor weakness in female  N81 89      From evaluation: Patient reports that she has felt issues with bloating and firmness in her abdomen  She reports that she doesn't have a BM unless she drinks coffee or aloe juice and feels as though it is difficult to pass  She strains regularly             Subjective: Patient offers no change in symptoms  Objective: See treatment diary below    Biofeedback performed in supine hooklying  Patient presents with weak tone  Performed 5 second active PFM contractions followed by 10 seconds of rest for 10 repetitions  Muscle activity during work phase measured 10 uV on average with the goal being > 12 0uV and muscle activity during rest phase measured 2 5 uV on average with the goal being < 2 5uV  Issued HEP x 3 minutes, 3 times a day for focus on contraction phase  Access Code: [email or web address]  URL: [email or web address]  Date: 2021  Prepared by: Tramaine Irene    Exercises  Supine Pelvic Floor Contract and Release - 3 x daily - 7 x weekly - 1 sets - 10 reps - 5 hold      Assessment: Tolerated treatment well  Patient would benefit from continued PT  Plan: Continue per plan of care        Precautions: h/o L breast CA      Manuals 3/25 3/30                                                               Neuro Re-Ed             NMR via BF nv 38'           Add DB  nv                                                                            Ther Ex                                                                                                                     Ther Activity             Education anatomy and POC x10' breathcontinuation x/ PFM x8'                        Gait Training Modalities

## 2021-03-30 ENCOUNTER — OFFICE VISIT (OUTPATIENT)
Dept: PHYSICAL THERAPY | Facility: REHABILITATION | Age: 56
End: 2021-03-30
Payer: COMMERCIAL

## 2021-03-30 DIAGNOSIS — N81.89 PELVIC FLOOR WEAKNESS IN FEMALE: ICD-10-CM

## 2021-03-30 DIAGNOSIS — N81.89 VAGINAL RELAXATION: Primary | ICD-10-CM

## 2021-03-30 DIAGNOSIS — N81.6 RECTOCELE: ICD-10-CM

## 2021-03-30 PROCEDURE — 97530 THERAPEUTIC ACTIVITIES: CPT | Performed by: PHYSICAL THERAPIST

## 2021-03-30 PROCEDURE — 97112 NEUROMUSCULAR REEDUCATION: CPT | Performed by: PHYSICAL THERAPIST

## 2021-04-05 ENCOUNTER — OFFICE VISIT (OUTPATIENT)
Dept: PHYSICAL THERAPY | Facility: REHABILITATION | Age: 56
End: 2021-04-05
Payer: COMMERCIAL

## 2021-04-05 DIAGNOSIS — N81.89 VAGINAL RELAXATION: Primary | ICD-10-CM

## 2021-04-05 DIAGNOSIS — N81.89 PELVIC FLOOR WEAKNESS IN FEMALE: ICD-10-CM

## 2021-04-05 DIAGNOSIS — N81.6 RECTOCELE: ICD-10-CM

## 2021-04-05 PROCEDURE — 97530 THERAPEUTIC ACTIVITIES: CPT | Performed by: PHYSICAL THERAPIST

## 2021-04-05 PROCEDURE — 97112 NEUROMUSCULAR REEDUCATION: CPT | Performed by: PHYSICAL THERAPIST

## 2021-04-05 PROCEDURE — 97110 THERAPEUTIC EXERCISES: CPT | Performed by: PHYSICAL THERAPIST

## 2021-04-05 NOTE — PROGRESS NOTES
Daily Note     Today's date: 2021  Patient name: Alejo Bruce  : 1965  MRN: [id number]  Referring provider: LUCIANA Silva  Dx:   Encounter Diagnosis     ICD-10-CM    1  Vaginal relaxation  N81 89    2  Rectocele  N81 6    3  Pelvic floor weakness in female  N81 89      From evaluation: Patient reports that she has felt issues with bloating and firmness in her abdomen  She reports that she doesn't have a BM unless she drinks coffee or aloe juice and feels as though it is difficult to pass  She strains regularly             Subjective: Patient reports that she feels like she is doing slightly better  She feels as if she has trouble isolating PFM  Objective: See treatment diary below      Assessment: Tolerated treatment well  Patient would benefit from continued PT  Breath focus added in sitting as HEP x 3 minutes each combined with GTB hip abduction  Good understanding and improved isolation this session  Plan: Continue per plan of care        Precautions: h/o L breast CA      Manuals 3/25 3/30 4/5                                                              Neuro Re-Ed             NMR via BF nv 38' setaed and supine x40'          Add DB  nv                                                                            Ther Ex             B+PFM+abduction   GTB x8'                                                                                                     Ther Activity             Education anatomy and POC x10' breathcontinuation x/ PFM x8' seated focus w/ breath x10'                       Gait Training                                       Modalities

## 2021-04-11 NOTE — PROGRESS NOTES
Daily Note     Today's date: 2021  Patient name: Nicolas Cisneros  : 1965  MRN: [id number]  Referring provider: LUCIANA Sullivan  Dx:   Encounter Diagnosis     ICD-10-CM    1  Vaginal relaxation  N81 89    2  Rectocele  N81 6    3  Pelvic floor weakness in female  N81 89      From evaluation: Patient reports that she has felt issues with bloating and firmness in her abdomen  She reports that she doesn't have a BM unless she drinks coffee or aloe juice and feels as though it is difficult to pass  She strains regularly             Subjective: Patient reports that she continues to feel anterior pressure with BMs, especially if she is having some alcoholic drinks  She is drinking aloe water and a cup of water at bedtime  Awakens at night once per night to void  Objective: See treatment diary below      Assessment: Tolerated treatment well  Patient would benefit from continued PT  We discussed considering adding partially ground flaxseed at the dosage of 1 tsp per day  I also recommended squatty potty again  With manual assessment (vaginally)  and vc's, patient was educated in proper breath strategies to lengthen and open PFM during BMs  She was challenged with this initially, but the cues "big breath, big belly" helped her to avoid a concentric contraction and she was able to engage her diaphragm correctly  Plan: Continue per plan of care  Review big belly and big breath nv        Precautions: h/o L breast CA      Manuals 3/25 3/30 4/5 4/12          PFM + breath    10'         Gentle MFR    10'         Elevators x 3 with cuing    8'         Lengthening PFM + breath    10'         Neuro Re-Ed             NMR via BF nv 38' setaed and supine x40'          Add DB  nv                                                                            Ther Ex             B+PFM+abduction   GTB x8'          PFM + breath + squat    8' Ther Activity             Education anatomy and POC x10' breathcontinuation x/ PFM x8' seated focus w/ breath x10' dietary recs x15'                      Gait Training                                       Modalities

## 2021-04-12 ENCOUNTER — OFFICE VISIT (OUTPATIENT)
Dept: PHYSICAL THERAPY | Facility: REHABILITATION | Age: 56
End: 2021-04-12
Payer: COMMERCIAL

## 2021-04-12 DIAGNOSIS — N81.89 PELVIC FLOOR WEAKNESS IN FEMALE: ICD-10-CM

## 2021-04-12 DIAGNOSIS — N81.89 VAGINAL RELAXATION: Primary | ICD-10-CM

## 2021-04-12 DIAGNOSIS — N81.6 RECTOCELE: ICD-10-CM

## 2021-04-12 PROCEDURE — 97530 THERAPEUTIC ACTIVITIES: CPT | Performed by: PHYSICAL THERAPIST

## 2021-04-12 PROCEDURE — 97112 NEUROMUSCULAR REEDUCATION: CPT | Performed by: PHYSICAL THERAPIST

## 2021-04-12 PROCEDURE — 97110 THERAPEUTIC EXERCISES: CPT | Performed by: PHYSICAL THERAPIST

## 2021-04-13 ENCOUNTER — APPOINTMENT (OUTPATIENT)
Dept: PHYSICAL THERAPY | Facility: REHABILITATION | Age: 56
End: 2021-04-13
Payer: COMMERCIAL

## 2021-04-26 ENCOUNTER — OFFICE VISIT (OUTPATIENT)
Dept: SURGICAL ONCOLOGY | Facility: CLINIC | Age: 56
End: 2021-04-26
Payer: COMMERCIAL

## 2021-04-26 VITALS
HEIGHT: 67 IN | SYSTOLIC BLOOD PRESSURE: 120 MMHG | DIASTOLIC BLOOD PRESSURE: 82 MMHG | WEIGHT: 126 LBS | HEART RATE: 69 BPM | BODY MASS INDEX: 19.78 KG/M2 | TEMPERATURE: 97.5 F

## 2021-04-26 DIAGNOSIS — R92.8 ABNORMAL MRI, BREAST: ICD-10-CM

## 2021-04-26 DIAGNOSIS — Z17.0 MALIGNANT NEOPLASM OF UPPER-OUTER QUADRANT OF LEFT BREAST IN FEMALE, ESTROGEN RECEPTOR POSITIVE (HCC): ICD-10-CM

## 2021-04-26 DIAGNOSIS — C50.412 MALIGNANT NEOPLASM OF UPPER-OUTER QUADRANT OF LEFT BREAST IN FEMALE, ESTROGEN RECEPTOR POSITIVE (HCC): ICD-10-CM

## 2021-04-26 DIAGNOSIS — Z79.810 USE OF TAMOXIFEN (NOLVADEX): ICD-10-CM

## 2021-04-26 DIAGNOSIS — R92.2 DENSE BREAST TISSUE: Primary | ICD-10-CM

## 2021-04-26 PROCEDURE — 99214 OFFICE O/P EST MOD 30 MIN: CPT | Performed by: SURGERY

## 2021-04-26 NOTE — PROGRESS NOTES
Surgical Oncology Follow Up       [facility] SURGICAL ONCOLOGY [facility] [address] [place] [address]    Naveen Lasso  1965  [id number]  [address] [facility] SURGICAL ONCOLOGY [place] [address]  [place]nie [place]anderos [id number]    Chief Complaint   Patient presents with    Follow-up       Assessment/Plan   Diagnoses and all orders for this visit:    Dense breast tissue  -     MRI breast bilateral w and wo contrast w cad; Future    Malignant neoplasm of upper-outer quadrant of left breast in female, estrogen receptor positive ([facility] [address] )  -     MRI breast bilateral w and wo contrast w cad; Future  -     Mammo diagnostic bilateral w 3d & cad; Future    Use of tamoxifen (Nolvadex)    Abnormal MRI, breast  -     MRI breast bilateral w and wo contrast w cad; Future        Advance Care Planning/Advance Directives:  Discussed disease status, cancer treatment plans and/or cancer treatment goals with the patient  Oncology History:    Oncology History   Malignant neoplasm of upper-outer quadrant of left breast in female, estrogen receptor positive ([facility] [address] )   11/2016 Initial Diagnosis    Malignant neoplasm of upper-outer quadrant of left breast in female, estrogen receptor positive ([facility] [address] )     11/17/2016 Biopsy    Left breast biopsy: invasive and in situ ductal carcinoma      1/3/2017 Surgery    Left lumpectomy and sentinel node biopsy  Final staging: Stage IA IDC, strongly ER/IN +, HER2-     2017 Genetic Testing    Oncotype score was 10  BRCA test was negative  2/2017 -  Hormone Therapy    Tamoxifen      3/13/2017 - 4/24/2017 Radiation    left breast to a dose of 5000 cGy followed by an additional 1000 cGy to the primary site         History of Present Illness:  Breast cancer follow-up, no concerns, continues on tamoxifen  -Interval History: breast MRI and bilateral mammogram    Review of Systems:  Review of Systems   Constitutional: Negative    Negative for appetite change and fever  Eyes: Negative  Respiratory: Negative for shortness of breath  Cardiovascular: Negative  Gastrointestinal: Negative  Endocrine: Negative  Genitourinary: Negative  Musculoskeletal: Negative  Negative for arthralgias and myalgias  Skin: Negative  Allergic/Immunologic: Negative  Neurological: Negative  Hematological: Negative  Negative for adenopathy  Does not bruise/bleed easily  Psychiatric/Behavioral: Negative          Patient Active Problem List   Diagnosis    Malignant neoplasm of upper-outer quadrant of left breast in female, estrogen receptor positive ([address] )    Right lower quadrant pain    Rectal pain    External hemorrhoid    Narrowing of stools    Vaginal discharge    Encntr for gyn exam (general) (routine) w abnormal findings    Vaginal relaxation    Routine screening for STI (sexually transmitted infection)    Use of tamoxifen (Nolvadex)    Dense breast tissue    Varicose veins of right lower extremity with pain    Coccyx pain    Heartburn    Pelvic floor relaxation    Abdominal bloating    Abnormal uterine bleeding (AUB)    Menopause    Abnormal MRI, breast     Past Medical History:   Diagnosis Date    Abdominal pain, left lower quadrant     Anxiety     BRCA negative     Common cold     Environmental and seasonal allergies     Exercises 5 to 6 times per week     Food allergy     scallops    Low back pain     Seasonal allergies     Use of tamoxifen (Nolvadex)     Vaginal discharge     Vaginal irritation     Vulvar burning     Vulvar irritation      Past Surgical History:   Procedure Laterality Date    APPENDECTOMY      BREAST BIOPSY Left 11/17/2016    U/S BX    COLONOSCOPY      MAMMO NEEDLE LOCALIZATION LEFT (ALL INC) Left 1/3/2017    NV BIOPSY/EXCISION, LYMPH NODE(S) Left 1/3/2017    Procedure: BIOPSY LYMPH NODE SENTINEL @ [address];  Surgeon: Raul Orozco MD;  Location: [facility] OR;  Service: Surgical Oncology    CO MASTECTOMY, PARTIAL Left 1/3/2017    Procedure: LUMPECTOMY BREAST NEEDLE LOCALIZED @ 0930;  Surgeon: Jason Trujillo MD;  Location: [facility];  Service: Surgical Oncology    SENTINEL LYMPH NODE BIOPSY      US GUIDED BREAST BIOPSY LEFT COMPLETE Left 11/17/2016     Family History   Problem Relation Age of Onset    Lung cancer Mother         mother age 59    Lung cancer Father         father age 66    Breast cancer Sister         age dx unk   [employer] Breast cancer Paternal Aunt         aunt 50    No Known Problems Brother     No Known Problems Daughter     No Known Problems Maternal Grandmother     No Known Problems Maternal Grandfather     No Known Problems Paternal Grandmother     No Known Problems Paternal Grandfather     No Known Problems Daughter     No Known Problems Maternal Aunt     No Known Problems Maternal Aunt     Colon cancer Neg Hx      Social History     Socioeconomic History    Marital status: /Civil Union     Spouse name: Not on file    Number of children: 2    Years of education: Not on file    Highest education level: Not on file   Occupational History    Occupation:    Social Needs    Financial resource strain: Not on file    Food insecurity     Worry: Not on file     Inability: Not on file   [employer] needs     Medical: Not on file     Non-medical: Not on file   Tobacco Use    Smoking status: Current Some Day Smoker     Types: Cigarettes    Smokeless tobacco: Never Used   Substance and Sexual Activity    Alcohol use: Yes     Frequency: 2-4 times a month     Drinks per session: 1 or 2     Binge frequency: Never    Drug use: No    Sexual activity: Yes     Partners: Male     Birth control/protection: None   Lifestyle    Physical activity     Days per week: Not on file     Minutes per session: Not on file    Stress: Not on file   Relationships    Social connections     Talks on phone: Not on file     Gets together: Not on file     Attends Taoist service: Not on file     Active member of club or organization: Not on file     Attends meetings of clubs or organizations: Not on file     Relationship status: Not on file    Intimate partner violence     Fear of current or ex partner: Not on file     Emotionally abused: Not on file     Physically abused: Not on file     Forced sexual activity: Not on file   Other Topics Concern    Not on file   Social History Narrative    Exercises 5-6 times per week    Full time employment    Lives with spouse    No caffeine use    2 children ages 25 and 21       Current Outpatient Medications:     ALPRAZolam (XANAX) 0 25 mg tablet, Take 0 25 mg by mouth daily as needed for anxiety, Disp: , Rfl:     azelastine (ASTELIN) 0 1 % nasal spray, INSTILL 2 SPRAYS INTO EACH NOSTRIL 2TIMES DAILY AS NEEDED FOR RHINITIS/ALLERGIES, Disp: 1 Bottle, Rfl: 1    Calcium 500 MG tablet, Take 1 tablet by mouth daily, Disp: , Rfl:     COLLAGEN PO, Take 1 tablet by mouth daily, Disp: , Rfl:     fexofenadine (ALLEGRA) 180 MG tablet, Take 1 tablet (180 mg total) by mouth daily, Disp: 90 tablet, Rfl: 2    Ibuprofen 200 MG CAPS, Take by mouth as needed, Disp: , Rfl:     methylPREDNISolone 4 MG tablet therapy pack, Use as directed on package, Disp: 1 each, Rfl: 0    MILK THISTLE PO, Take 1 tablet by mouth daily, Disp: , Rfl:     Multiple Vitamins-Minerals (MULTIVITAMIN ADULT PO), Take 1 tablet by mouth daily, Disp: , Rfl:     olopatadine HCl (PATADAY) 0 2 % opth drops, , Disp: , Rfl:     Probiotic Product (PROBIOTIC-10 PO), Take by mouth, Disp: , Rfl:     tamoxifen (NOLVADEX) 20 mg tablet, Take 1 tablet (20 mg total) by mouth daily, Disp: 90 tablet, Rfl: 2  Allergies   Allergen Reactions    Other Other (See Comments)     Scallops-pt states she has swelling and vomiting  Also seasonal        The following portions of the patient's history were reviewed and updated as appropriate: allergies, current medications, past family history, past medical history, past social history, past surgical history and problem list         Vitals:    04/26/21 0835   BP: 120/82   Pulse: 69   Temp: 97 5 °F (36 4 °C)       Physical Exam  Constitutional:       General: She is not in acute distress  Appearance: Normal appearance  She is well-developed  HENT:      Head: Normocephalic and atraumatic  Cardiovascular:      Heart sounds: Normal heart sounds  Pulmonary:      Breath sounds: Normal breath sounds  Chest:      Breasts:         Right: No inverted nipple, mass, nipple discharge, skin change or tenderness  Left: Skin change (  Lumpectomy scar) present  No inverted nipple, mass, nipple discharge or tenderness  Abdominal:      Palpations: Abdomen is soft  Lymphadenopathy:      Upper Body:      Right upper body: No supraclavicular, axillary or pectoral adenopathy  Left upper body: No supraclavicular, axillary or pectoral adenopathy  Neurological:      Mental Status: She is alert and oriented to person, place, and time  Psychiatric:         Mood and Affect: Mood normal            Results:  Labs:      Imaging   02/11/2020 bilateral breast MRI shows a 3 mm area of non mass enhancement stable in nature on the left side for which a follow-up was recommended in one year   08/24/2020 bilateral 3D diagnostic mammogram is benign BI-RADS two with a density of three    I reviewed the above imaging data  Discussion/Summary: 53-year-old female status post left breast conservation for a stage I invasive lobular carcinoma  She did have radiation therapy and continues on tamoxifen  There is no evidence of disease based on examination today  Her last mammogram was benign  An MRI done a little over a year ago was stable with a recommendation for a follow-up in one year  This is due now  I will make these arrangements for her    I will order her mammogram for August and plan to see her back in six months or sooner should the need arise

## 2021-05-06 ENCOUNTER — APPOINTMENT (OUTPATIENT)
Dept: PHYSICAL THERAPY | Facility: REHABILITATION | Age: 56
End: 2021-05-06
Payer: COMMERCIAL

## 2021-05-17 ENCOUNTER — APPOINTMENT (OUTPATIENT)
Dept: PHYSICAL THERAPY | Facility: REHABILITATION | Age: 56
End: 2021-05-17
Payer: COMMERCIAL

## 2021-06-06 NOTE — PROGRESS NOTES
Daily Note /Discharge    Today's date: 2021  Patient name: Kan Mora  : 1965  MRN: [id number]  Referring provider: LUCIANA Pruett  Dx:   Encounter Diagnosis     ICD-10-CM    1  Vaginal relaxation  N81 89    2  Rectocele  N81 6    3  Pelvic floor weakness in female  N81 89      From evaluation: Patient reports that she has felt issues with bloating and firmness in her abdomen  She reports that she doesn't have a BM unless she drinks coffee or aloe juice and feels as though it is difficult to pass  She strains regularly             Subjective: Patient hasn't been here in 2 months due to family loss and busy schedule  She reports that she continues with constipation if she doesn't eat well, skips her magnesium or doesn't eat enough fiber  She has been semi-compliant with her exercises that we've gone over  Feels as though she can evacuate stool with more proficiency and less sensation of pressure afterwards  She acquired squatty potty and has been using it regularly  Objective: See treatment diary below      Assessment: Tolerated treatment well  Patient is appropriate for discharge  PFM assessment performed and we reviewed proper PFM contraction and relaxation as well as proper breathing and lengthening of pelvic floor while trying to have a BM to avoid excessive IAP and worsening of POP  She expressed good understanding of techniques  Also taught patient ILU abdominal/colon massage and provided handout for technique education  Dietary and fluid recommendations reviewed and patient reports that she feels therapy was of benefit  Goals from evaluation:   STGs to be met in 3 weeks:  * Patient will be compliant with introductory HEP as prescribed  MET  * Patient presents with good understanding of pelvic floor protective strategies to reduce intra-abdominal pressure against pelvic organs  MET  * Patient will demonstrate and report good postural techniques with toileting   MET    LTGs to be met by discharge:  * Patient will present with a  60% improvement on FOTO score by discharge to indicate improved symptom management  * Normalize sEMG findings to indicate strength average > 12uV and resting average < 2 0uV  NA   * Demonstrates correct isolation and relaxation of pelvic floor to palpation without overflow from global stabilizers  MET  * Patient will use pelvic floor muscles correctly during functional ADLs such as coughing, sneezing, lifting and exercise activities to avoid excessive IAP and PFM strain  MET  * Patient will be compliant with comprehensive home exercise program for self management of condition  MET    Plan: Discharge PT           Precautions: h/o L breast CA      Manuals 3/25 3/30 4/5 4/12 6/7         PFM + breath    10' 10'        Gentle MFR    10'         Elevators x 3 with cuing    8'         Lengthening PFM + breath    10' 10'        ILU massage     5'        Neuro Re-Ed             NMR via BF nv 38' setaed and supine x40'          Add DB  nv                                                                            Ther Ex             B+PFM+abduction   GTB x8'          PFM + breath + squat    8'         LTR     5'        LTR 90:90     5'                                                            Ther Activity             Education anatomy and POC x10' breathcontinuation x/ PFM x8' seated focus w/ breath x10' dietary recs x15' ILU massage x15'        DC instructions     10'        Gait Training                                       Modalities

## 2021-06-07 ENCOUNTER — EVALUATION (OUTPATIENT)
Dept: PHYSICAL THERAPY | Facility: REHABILITATION | Age: 56
End: 2021-06-07
Payer: COMMERCIAL

## 2021-06-07 DIAGNOSIS — N81.6 RECTOCELE: ICD-10-CM

## 2021-06-07 DIAGNOSIS — N81.89 VAGINAL RELAXATION: Primary | ICD-10-CM

## 2021-06-07 DIAGNOSIS — N81.89 PELVIC FLOOR WEAKNESS IN FEMALE: ICD-10-CM

## 2021-06-07 PROCEDURE — 97110 THERAPEUTIC EXERCISES: CPT | Performed by: PHYSICAL THERAPIST

## 2021-06-07 PROCEDURE — 97140 MANUAL THERAPY 1/> REGIONS: CPT | Performed by: PHYSICAL THERAPIST

## 2021-06-07 PROCEDURE — 97530 THERAPEUTIC ACTIVITIES: CPT | Performed by: PHYSICAL THERAPIST

## 2021-07-31 ENCOUNTER — HOSPITAL ENCOUNTER (OUTPATIENT)
Dept: RADIOLOGY | Facility: HOSPITAL | Age: 56
Discharge: HOME | End: 2021-07-31
Attending: SURGERY
Payer: COMMERCIAL

## 2021-07-31 DIAGNOSIS — R92.2 DENSE BREAST TISSUE: ICD-10-CM

## 2021-07-31 DIAGNOSIS — Z17.0 MALIGNANT NEOPLASM OF UPPER-OUTER QUADRANT OF LEFT BREAST IN FEMALE, ESTROGEN RECEPTOR POSITIVE (HCC): ICD-10-CM

## 2021-07-31 DIAGNOSIS — C50.412 MALIGNANT NEOPLASM OF UPPER-OUTER QUADRANT OF LEFT BREAST IN FEMALE, ESTROGEN RECEPTOR POSITIVE (HCC): ICD-10-CM

## 2021-07-31 DIAGNOSIS — R92.8 ABNORMAL MRI, BREAST: ICD-10-CM

## 2021-07-31 PROCEDURE — G1004 CDSM NDSC: HCPCS

## 2021-07-31 PROCEDURE — A9585 GADOBUTROL INJECTION: HCPCS | Performed by: SURGERY

## 2021-07-31 PROCEDURE — C8908 MRI W/O FOL W/CONT, BREAST,: HCPCS

## 2021-07-31 RX ADMIN — GADOBUTROL 6 ML: 604.72 INJECTION INTRAVENOUS at 11:28

## 2021-08-27 ENCOUNTER — APPOINTMENT (OUTPATIENT)
Dept: LAB | Facility: HOSPITAL | Age: 56
End: 2021-08-27
Attending: INTERNAL MEDICINE
Payer: COMMERCIAL

## 2021-08-27 DIAGNOSIS — Z17.0 MALIGNANT NEOPLASM OF UPPER-OUTER QUADRANT OF LEFT BREAST IN FEMALE, ESTROGEN RECEPTOR POSITIVE (HCC): ICD-10-CM

## 2021-08-27 DIAGNOSIS — C50.412 MALIGNANT NEOPLASM OF UPPER-OUTER QUADRANT OF LEFT BREAST IN FEMALE, ESTROGEN RECEPTOR POSITIVE (HCC): ICD-10-CM

## 2021-08-27 DIAGNOSIS — Z78.0 MENOPAUSE: ICD-10-CM

## 2021-08-27 LAB
ALBUMIN SERPL BCP-MCNC: 3.7 G/DL (ref 3.5–5)
ALP SERPL-CCNC: 57 U/L (ref 46–116)
ALT SERPL W P-5'-P-CCNC: 30 U/L (ref 12–78)
ANION GAP SERPL CALCULATED.3IONS-SCNC: 5 MMOL/L (ref 4–13)
AST SERPL W P-5'-P-CCNC: 19 U/L (ref 5–45)
BASOPHILS # BLD AUTO: 0.04 THOUSANDS/ΜL (ref 0–0.1)
BASOPHILS NFR BLD AUTO: 1 % (ref 0–1)
BILIRUB SERPL-MCNC: 0.63 MG/DL (ref 0.2–1)
BUN SERPL-MCNC: 16 MG/DL (ref 5–25)
CALCIUM SERPL-MCNC: 9.6 MG/DL (ref 8.3–10.1)
CANCER AG27-29 SERPL-ACNC: 17 U/ML (ref 0–42.3)
CHLORIDE SERPL-SCNC: 104 MMOL/L (ref 100–108)
CO2 SERPL-SCNC: 27 MMOL/L (ref 21–32)
CREAT SERPL-MCNC: 0.65 MG/DL (ref 0.6–1.3)
EOSINOPHIL # BLD AUTO: 0.12 THOUSAND/ΜL (ref 0–0.61)
EOSINOPHIL NFR BLD AUTO: 2 % (ref 0–6)
ERYTHROCYTE [DISTWIDTH] IN BLOOD BY AUTOMATED COUNT: 13.4 % (ref 11.6–15.1)
ESTRADIOL SERPL-MCNC: 16 PG/ML
GFR SERPL CREATININE-BSD FRML MDRD: 101 ML/MIN/1.73SQ M
GLUCOSE SERPL-MCNC: 82 MG/DL (ref 65–140)
HCT VFR BLD AUTO: 40 % (ref 34.8–46.1)
HGB BLD-MCNC: 13 G/DL (ref 11.5–15.4)
IMM GRANULOCYTES # BLD AUTO: 0.03 THOUSAND/UL (ref 0–0.2)
IMM GRANULOCYTES NFR BLD AUTO: 1 % (ref 0–2)
LYMPHOCYTES # BLD AUTO: 1.9 THOUSANDS/ΜL (ref 0.6–4.47)
LYMPHOCYTES NFR BLD AUTO: 33 % (ref 14–44)
MCH RBC QN AUTO: 30.2 PG (ref 26.8–34.3)
MCHC RBC AUTO-ENTMCNC: 32.5 G/DL (ref 31.4–37.4)
MCV RBC AUTO: 93 FL (ref 82–98)
MONOCYTES # BLD AUTO: 0.7 THOUSAND/ΜL (ref 0.17–1.22)
MONOCYTES NFR BLD AUTO: 12 % (ref 4–12)
NEUTROPHILS # BLD AUTO: 2.97 THOUSANDS/ΜL (ref 1.85–7.62)
NEUTS SEG NFR BLD AUTO: 51 % (ref 43–75)
NRBC BLD AUTO-RTO: 0 /100 WBCS
PLATELET # BLD AUTO: 270 THOUSANDS/UL (ref 149–390)
PMV BLD AUTO: 12.1 FL (ref 8.9–12.7)
POTASSIUM SERPL-SCNC: 4 MMOL/L (ref 3.5–5.3)
PROT SERPL-MCNC: 7.3 G/DL (ref 6.4–8.2)
RBC # BLD AUTO: 4.31 MILLION/UL (ref 3.81–5.12)
SODIUM SERPL-SCNC: 136 MMOL/L (ref 136–145)
WBC # BLD AUTO: 5.76 THOUSAND/UL (ref 4.31–10.16)

## 2021-08-27 PROCEDURE — 36415 COLL VENOUS BLD VENIPUNCTURE: CPT

## 2021-08-27 PROCEDURE — 80053 COMPREHEN METABOLIC PANEL: CPT

## 2021-08-27 PROCEDURE — 86300 IMMUNOASSAY TUMOR CA 15-3: CPT

## 2021-08-27 PROCEDURE — 85025 COMPLETE CBC W/AUTO DIFF WBC: CPT

## 2021-08-27 PROCEDURE — 82670 ASSAY OF TOTAL ESTRADIOL: CPT

## 2021-08-30 ENCOUNTER — OFFICE VISIT (OUTPATIENT)
Dept: HEMATOLOGY ONCOLOGY | Facility: CLINIC | Age: 56
End: 2021-08-30
Payer: COMMERCIAL

## 2021-08-30 VITALS
SYSTOLIC BLOOD PRESSURE: 122 MMHG | BODY MASS INDEX: 19.46 KG/M2 | TEMPERATURE: 98.9 F | HEART RATE: 63 BPM | WEIGHT: 124 LBS | OXYGEN SATURATION: 97 % | DIASTOLIC BLOOD PRESSURE: 70 MMHG | HEIGHT: 67 IN | RESPIRATION RATE: 18 BRPM

## 2021-08-30 DIAGNOSIS — C50.412 MALIGNANT NEOPLASM OF UPPER-OUTER QUADRANT OF LEFT BREAST IN FEMALE, ESTROGEN RECEPTOR POSITIVE (HCC): Primary | ICD-10-CM

## 2021-08-30 DIAGNOSIS — Z17.0 MALIGNANT NEOPLASM OF UPPER-OUTER QUADRANT OF LEFT BREAST IN FEMALE, ESTROGEN RECEPTOR POSITIVE (HCC): Primary | ICD-10-CM

## 2021-08-30 PROCEDURE — 99214 OFFICE O/P EST MOD 30 MIN: CPT | Performed by: INTERNAL MEDICINE

## 2021-08-30 NOTE — PROGRESS NOTES
HPI:  Follow-up visit for  hormone receptor positive, HER-2 negative, G1, T1c, 1 4 cm, stage I invasive mammary carcinoma in the left breast  In January 2017 patient had left breast lumpectomy and sentinel lymph node sampling  Oncotype score was 10  BRCA test was negative  She received radiation  Since February 2017 she has been on tamoxifen  She is still premenopausal   Menstrual periods are becoming less frequent like once every 3 months or so and scanty  She follows with her gynecologist   We talked about ovarian suppression or BSO because results are better when combined with tamoxifen or AI  She wants to stay on tamoxifen and go into menopause  naturally   Ana Dias Not having any symptoms secondary to tamoxifen  Has less anxiety                       Current Outpatient Medications:     ALPRAZolam (XANAX) 0 25 mg tablet, Take 0 25 mg by mouth daily as needed for anxiety, Disp: , Rfl:     azelastine (ASTELIN) 0 1 % nasal spray, INSTILL 2 SPRAYS INTO EACH NOSTRIL 2TIMES DAILY AS NEEDED FOR RHINITIS/ALLERGIES, Disp: 1 Bottle, Rfl: 1    Calcium 500 MG tablet, Take 1 tablet by mouth daily, Disp: , Rfl:     COLLAGEN PO, Take 1 tablet by mouth daily, Disp: , Rfl:     fexofenadine (ALLEGRA) 180 MG tablet, Take 1 tablet (180 mg total) by mouth daily, Disp: 90 tablet, Rfl: 2    Ibuprofen 200 MG CAPS, Take by mouth as needed, Disp: , Rfl:     MILK THISTLE PO, Take 1 tablet by mouth daily, Disp: , Rfl:     Multiple Vitamins-Minerals (MULTIVITAMIN ADULT PO), Take 1 tablet by mouth daily, Disp: , Rfl:     olopatadine HCl (PATADAY) 0 2 % opth drops, , Disp: , Rfl:     Probiotic Product (PROBIOTIC-10 PO), Take by mouth, Disp: , Rfl:     tamoxifen (NOLVADEX) 20 mg tablet, Take 1 tablet (20 mg total) by mouth daily, Disp: 90 tablet, Rfl: 2    methylPREDNISolone 4 MG tablet therapy pack, Use as directed on package (Patient not taking: Reported on 8/30/2021), Disp: 1 each, Rfl: 0    Allergies   Allergen Reactions  Other Other (See Comments)     Scallops-pt states she has swelling and vomiting  Also seasonal        Oncology History   Malignant neoplasm of upper-outer quadrant of left breast in female, estrogen receptor positive ([facility] [address] )   11/2016 Initial Diagnosis    Malignant neoplasm of upper-outer quadrant of left breast in female, estrogen receptor positive ([facility] [address] )     11/17/2016 Biopsy    Left breast biopsy: invasive and in situ ductal carcinoma      1/3/2017 Surgery    Left lumpectomy and sentinel node biopsy  Final staging: Stage IA IDC, strongly ER/VA +, HER2-     2017 Genetic Testing    Oncotype score was 10  BRCA test was negative  2/2017 -  Hormone Therapy    Tamoxifen      3/13/2017 - 4/24/2017 Radiation    left breast to a dose of 5000 cGy followed by an additional 1000 cGy to the primary site         ROS:  08/30/21 Reviewed 12 systems: See symptoms in HPI:   Presently no  neurological, cardiac, pulmonary, GI and  symptoms  Other Symptoms are in HPI  No fever, chills, bleeding, bone pains, skin rash, weight loss, arthritic symptoms, tiredness ,  weakness, numbness,  claudication and gait problem  No frequent infections  Not unusually sensitive to heat or cold  No swelling of the ankles  No swollen glands  Patient is less anxious  /70 (BP Location: Left arm, Patient Position: Sitting, Cuff Size: Adult)   Pulse 63   Temp 98 9 °F (37 2 °C)   Resp 18   Ht 5' 7" (1 702 m)   Wt 56 2 kg (124 lb)   SpO2 97%   BMI 19 42 kg/m²     Physical Exam[de-identified]   My medical assistant Nasima Mendez  was in the room with me during examination    Patient is alert and oriented  She is not in distress  Stable vital signs    No icterus, no oral thrush, no palpable neck mass, clear lung fields, regular heart rate, abdomen  soft and non tender, no palpable abdominal mass, no ascites, no edema of ankles, no calf tenderness, no focal neurological deficit, no skin rash, no palpable lymphadenopathy in the neck and axillary areas,  no clubbing  Patient is less anxious  Performance status 0      IMAGING:  ASSESSMENT/BI-RADS CATEGORY:  Left: 2 - Benign  Right: 1 - Negative  Overall: 2 - Benign     RECOMMENDATION:       - Diagnostic mammogram at the current time for both breasts      Workstation ID: [id number]   Imaging    MRI breast bilateral w and wo contrast w cad (Order: [id number]) - 7/31/2021        LABS:    Results for orders placed or performed in visit on 08/27/21   CBC and differential   Result Value Ref Range    WBC 5 76 4 31 - 10 16 Thousand/uL    RBC 4 31 3 81 - 5 12 Million/uL    Hemoglobin 13 0 11 5 - 15 4 g/dL    Hematocrit 40 0 34 8 - 46 1 %    MCV 93 82 - 98 fL    MCH 30 2 26 8 - 34 3 pg    MCHC 32 5 31 4 - 37 4 g/dL    RDW 13 4 11 6 - 15 1 %    MPV 12 1 8 9 - 12 7 fL    Platelets 269 163 - 713 Thousands/uL    nRBC 0 /100 WBCs    Neutrophils Relative 51 43 - 75 %    Immat GRANS % 1 0 - 2 %    Lymphocytes Relative 33 14 - 44 %    Monocytes Relative 12 4 - 12 %    Eosinophils Relative 2 0 - 6 %    Basophils Relative 1 0 - 1 %    Neutrophils Absolute 2 97 1 85 - 7 62 Thousands/µL    Immature Grans Absolute 0 03 0 00 - 0 20 Thousand/uL    Lymphocytes Absolute 1 90 0 60 - 4 47 Thousands/µL    Monocytes Absolute 0 70 0 17 - 1 22 Thousand/µL    Eosinophils Absolute 0 12 0 00 - 0 61 Thousand/µL    Basophils Absolute 0 04 0 00 - 0 10 Thousands/µL   Comprehensive metabolic panel   Result Value Ref Range    Sodium 136 136 - 145 mmol/L    Potassium 4 0 3 5 - 5 3 mmol/L    Chloride 104 100 - 108 mmol/L    CO2 27 21 - 32 mmol/L    ANION GAP 5 4 - 13 mmol/L    BUN 16 5 - 25 mg/dL    Creatinine 0 65 0 60 - 1 30 mg/dL    Glucose 82 65 - 140 mg/dL    Calcium 9 6 8 3 - 10 1 mg/dL    AST 19 5 - 45 U/L    ALT 30 12 - 78 U/L    Alkaline Phosphatase 57 46 - 116 U/L    Total Protein 7 3 6 4 - 8 2 g/dL    Albumin 3 7 3 5 - 5 0 g/dL    Total Bilirubin 0 63 0 20 - 1 00 mg/dL    eGFR 101 ml/min/1 73sq m   Cancer antigen 27 29   Result Value Ref Range    CA 27 29 17 0 0 0 - 42 3 U/mL   Estradiol   Result Value Ref Range    Estradiol 16 0   pg/mL     Labs, Imaging, & Other studies:   All pertinent labs and imaging studies were personally reviewed    Lab Results   Component Value Date    K 4 0 08/27/2021     08/27/2021    CO2 27 08/27/2021    BUN 16 08/27/2021    CREATININE 0 65 08/27/2021    GLUF 78 02/26/2021    CALCIUM 9 6 08/27/2021    AST 19 08/27/2021    ALT 30 08/27/2021    ALKPHOS 57 08/27/2021    EGFR 101 08/27/2021     Lab Results   Component Value Date    WBC 5 76 08/27/2021    HGB 13 0 08/27/2021    HCT 40 0 08/27/2021    MCV 93 08/27/2021     08/27/2021       Reviewed CBC, CMP, CA 27-29, estradiol and mammography and discussed with patient  Assessment and plan: Sabrina Villalta Follow-up visit for  hormone receptor positive, HER-2 negative, G1, T1c, 1 4 cm, stage I invasive mammary carcinoma in the left breast  In January 2017 patient had left breast lumpectomy and sentinel lymph node sampling  Oncotype score was 10  BRCA test was negative  She received radiation  Since February 2017 she has been on tamoxifen  She is still premenopausal   Menstrual periods are becoming less frequent like once every 3 months or so and scanty  She follows with her gynecologist   We talked about ovarian suppression or BSO because results are better when combined with tamoxifen or AI  She wants to stay on tamoxifen and go into menopause  naturally   Sabrina Villalta Not having any symptoms secondary to tamoxifen  Has less anxiety              Physical examination and test results are as recorded and discussed She remains in remission from breast cancer   She will continue to take tamoxifen      She goes to her breast surgeon for examination of breasts and imaging studies     She goes to her gynecologist      Discussed the importance of self-breast examination, eating healthy foods, staying active and health screening tests  Patient is capable of self-care  Discussed precautions against coronavirus     She will continue to follow with her primary physician and other consultants  See diagnoses, orders instructions    1  Malignant neoplasm of upper-outer quadrant of left breast in female, estrogen receptor positive ([facility] [address] )    - CBC and differential; Future  - Comprehensive metabolic panel; Future  - Cancer antigen 27 29; Future          Blood work prior to next visit in 6 months  No change in tamoxifen      Patient voiced understanding and agreed      Counseling / Coordination of Care      Provided counseling and support

## 2021-09-08 ENCOUNTER — HOSPITAL ENCOUNTER (OUTPATIENT)
Dept: MAMMOGRAPHY | Facility: CLINIC | Age: 56
Discharge: HOME | End: 2021-09-08
Payer: COMMERCIAL

## 2021-09-08 VITALS — BODY MASS INDEX: 19.46 KG/M2 | HEIGHT: 67 IN | WEIGHT: 124 LBS

## 2021-09-08 DIAGNOSIS — Z17.0 MALIGNANT NEOPLASM OF UPPER-OUTER QUADRANT OF LEFT BREAST IN FEMALE, ESTROGEN RECEPTOR POSITIVE (HCC): ICD-10-CM

## 2021-09-08 DIAGNOSIS — C50.412 MALIGNANT NEOPLASM OF UPPER-OUTER QUADRANT OF LEFT BREAST IN FEMALE, ESTROGEN RECEPTOR POSITIVE (HCC): ICD-10-CM

## 2021-09-08 PROCEDURE — G0279 TOMOSYNTHESIS, MAMMO: HCPCS

## 2021-09-08 PROCEDURE — 77066 DX MAMMO INCL CAD BI: CPT

## 2021-11-08 ENCOUNTER — OFFICE VISIT (OUTPATIENT)
Dept: SURGICAL ONCOLOGY | Facility: CLINIC | Age: 56
End: 2021-11-08
Payer: COMMERCIAL

## 2021-11-08 ENCOUNTER — TELEPHONE (OUTPATIENT)
Dept: HEMATOLOGY ONCOLOGY | Facility: CLINIC | Age: 56
End: 2021-11-08

## 2021-11-08 VITALS
SYSTOLIC BLOOD PRESSURE: 120 MMHG | DIASTOLIC BLOOD PRESSURE: 70 MMHG | TEMPERATURE: 99 F | WEIGHT: 125 LBS | HEIGHT: 67 IN | OXYGEN SATURATION: 98 % | RESPIRATION RATE: 16 BRPM | BODY MASS INDEX: 19.62 KG/M2 | HEART RATE: 81 BPM

## 2021-11-08 DIAGNOSIS — C50.412 MALIGNANT NEOPLASM OF UPPER-OUTER QUADRANT OF LEFT BREAST IN FEMALE, ESTROGEN RECEPTOR POSITIVE (HCC): Primary | ICD-10-CM

## 2021-11-08 DIAGNOSIS — Z79.810 USE OF TAMOXIFEN (NOLVADEX): ICD-10-CM

## 2021-11-08 DIAGNOSIS — R92.8 ABNORMAL MRI, BREAST: ICD-10-CM

## 2021-11-08 DIAGNOSIS — Z17.0 MALIGNANT NEOPLASM OF UPPER-OUTER QUADRANT OF LEFT BREAST IN FEMALE, ESTROGEN RECEPTOR POSITIVE (HCC): Primary | ICD-10-CM

## 2021-11-08 DIAGNOSIS — R92.2 DENSE BREAST TISSUE: ICD-10-CM

## 2021-11-08 PROCEDURE — 99214 OFFICE O/P EST MOD 30 MIN: CPT | Performed by: SURGERY

## 2021-11-08 RX ORDER — METRONIDAZOLE 7.5 MG/G
LOTION TOPICAL AS NEEDED
COMMUNITY
Start: 2021-09-30 | End: 2021-11-30 | Stop reason: ALTCHOICE

## 2021-11-30 PROBLEM — J30.1 CHRONIC SEASONAL ALLERGIC RHINITIS DUE TO POLLEN: Status: ACTIVE | Noted: 2021-11-30

## 2021-11-30 PROBLEM — J30.81 ALLERGIC RHINITIS DUE TO DOGS: Status: ACTIVE | Noted: 2021-11-30

## 2022-01-25 ENCOUNTER — CLINICAL SUPPORT (OUTPATIENT)
Dept: RADIATION ONCOLOGY | Facility: CLINIC | Age: 57
End: 2022-01-25
Attending: RADIOLOGY
Payer: COMMERCIAL

## 2022-01-25 VITALS
SYSTOLIC BLOOD PRESSURE: 120 MMHG | HEART RATE: 75 BPM | RESPIRATION RATE: 16 BRPM | WEIGHT: 122.8 LBS | TEMPERATURE: 98.1 F | HEIGHT: 67 IN | BODY MASS INDEX: 19.27 KG/M2 | DIASTOLIC BLOOD PRESSURE: 76 MMHG | OXYGEN SATURATION: 99 %

## 2022-01-25 DIAGNOSIS — Z17.0 MALIGNANT NEOPLASM OF UPPER-OUTER QUADRANT OF LEFT BREAST IN FEMALE, ESTROGEN RECEPTOR POSITIVE (HCC): Primary | ICD-10-CM

## 2022-01-25 DIAGNOSIS — C50.412 MALIGNANT NEOPLASM OF UPPER-OUTER QUADRANT OF LEFT BREAST IN FEMALE, ESTROGEN RECEPTOR POSITIVE (HCC): Primary | ICD-10-CM

## 2022-01-25 PROCEDURE — 99213 OFFICE O/P EST LOW 20 MIN: CPT | Performed by: RADIOLOGY

## 2022-01-25 PROCEDURE — 99211 OFF/OP EST MAY X REQ PHY/QHP: CPT | Performed by: RADIOLOGY

## 2022-01-25 NOTE — PROGRESS NOTES
Follow-up - Radiation Oncology   Laura Olsen 1965 49 y o  female [id number]      History of Present Illness   Cancer Staging  Malignant neoplasm of upper-outer quadrant of left breast in female, estrogen receptor positive ([facility] [address] )  Staging form: Breast, AJCC 7th Edition  - Clinical: Stage IA (T1c, N0, M0) - Signed by Kiki Patino MD on 5/7/2018  Laterality: Left  Histologic grade (G): G1  Estrogen receptor status: Positive  Progesterone receptor status: Positive  HER2 status: Negative  - Pathologic: Stage IA (T1c, N0, cM0) - Signed by Conner Mejia MD on 6/4/2018  Laterality: Left  Method of lymph node assessment: [place] lymph node biopsy  Histologic grade (G): G1  Estrogen receptor status: Positive  Percentage of positive estrogen receptors (%): 100  Progesterone receptor status: Positive  Percentage of positive progesterone receptors (%): 100  HER2 status: Negative  Multi-gene signature score: 10            Interval History:  Carmen Jacobo is U 89 year old female who is nearly 5 years post adjuvant radiation therapy for Stage IA  left breast carcinoma  She completed radiation to the left breast on 4/24/17  The pt was last seen in radiation on 01/26/21  She returns today for her follow up      03/01/21 - Hem Onc Proothi  Pt remains in remission from breast cancer  Pt to continue on Tamoxifen  Follow up in 6 months     04/26/21 - Surg OncKirit  Pt remains on Tamoxifen  No evidence of disease  Follow up in 6 months        07/31/21 - MRI breast bilateral w and wo contrast w cad  IMPRESSION:   Benign findings as above  Patient will be due for annual mammography after 08/24/2021  Annual breast MRI screening can be considered if clinically indicated    ASSESSMENT/BI-RADS CATEGORY:  Left: 2 - Benign  Right: 1 - Negative  Overall: 2 - Benign     08/30/21 - Hem Onc Proothi  Pt to continues on Tamoxifen  Follow up in 6 months      09/08/21 - Mammo diagnostic bilateral w 3d & cad  IMPRESSION:   Stable exam   This patient has been identified as being at elevated risk for development of breast cancer based on the Tyrer-Cuzick model  She may benefit from supplemental screening  ASSESSMENT/BI-RADS CATEGORY:  Overall: 2 - Benign     11/08/21 - Surg Onc, Deyanira  Pt continues on Tamoxifen  No evidence of disease  Follow up in 6 months         Historical Information   Oncology History   Malignant neoplasm of upper-outer quadrant of left breast in female, estrogen receptor positive ([facility] [address] )   11/2016 Initial Diagnosis    Malignant neoplasm of upper-outer quadrant of left breast in female, estrogen receptor positive ([facility] [address] )     11/17/2016 Biopsy    Left breast biopsy: invasive and in situ ductal carcinoma      1/3/2017 Surgery    Left lumpectomy and sentinel node biopsy  Final staging: Stage IA IDC, strongly ER/FL +, HER2-     2017 Genetic Testing    Oncotype score was 10  BRCA test was negative       2/2017 -  Hormone Therapy    Tamoxifen      3/13/2017 - 4/24/2017 Radiation    left breast to a dose of 5000 cGy followed by an additional 1000 cGy to the primary site         Past Medical History:   Diagnosis Date    Abdominal pain, left lower quadrant     Anxiety     BRCA negative     Common cold     Environmental and seasonal allergies     Exercises 5 to 6 times per week     Food allergy     scallops    Low back pain     Seasonal allergies     Use of tamoxifen (Nolvadex)     Vaginal discharge     Vaginal irritation     Vulvar burning     Vulvar irritation      Past Surgical History:   Procedure Laterality Date    APPENDECTOMY      BREAST BIOPSY Left 11/17/2016    U/S BX    COLONOSCOPY      MAMMO NEEDLE LOCALIZATION LEFT (ALL INC) Left 1/3/2017    FL BIOPSY/EXCISION, LYMPH NODE(S) Left 1/3/2017    Procedure: BIOPSY LYMPH NODE [address]ENTINEL @ [address]  Surgeon: Katlyn Joe MD;  [facility]: [facility];  Service: Surgical Oncology    FL MASTECTOMY, PARTIAL Left 1/3/2017    Procedure: LUMPECTOMY BREAST NEEDLE LOCALIZED @ 0930;  Surgeon: Jr Munoz MD;  [facility]: [facility];  Service: Surgical Oncology    SENTINEL LYMPH NODE BIOPSY      US GUIDED BREAST BIOPSY LEFT COMPLETE Left 11/17/2016       Social History   Social History     Substance and Sexual Activity   Alcohol Use Yes    Comment: 5 beverages/wk     Social History     Substance and Sexual Activity   Drug Use Never     Social History     Tobacco Use   Smoking Status Current Some Day Smoker    Types: Cigarettes   Smokeless Tobacco Never Used   Tobacco Comment    occasionally/socially smokes - approx 2/wk         Meds/Allergies     Current Outpatient Medications:     ALPRAZolam (XANAX) 0 25 mg tablet, Take 0 25 mg by mouth daily as needed for anxiety, Disp: , Rfl:     azelastine (ASTELIN) 0 1 % nasal spray, INSTILL 2 SPRAYS INTO EACH NOSTRIL 2TIMES DAILY AS NEEDED FOR RHINITIS/ALLERGIES, Disp: 1 Bottle, Rfl: 1    Calcium 500 MG tablet, Take 1 tablet by mouth daily, Disp: , Rfl:     COLLAGEN PO, Take 1 tablet by mouth daily, Disp: , Rfl:     fexofenadine (ALLEGRA) 180 MG tablet, Take 1 tablet (180 mg total) by mouth daily, Disp: 90 tablet, Rfl: 2    fluticasone (FLONASE) 50 mcg/act nasal spray, 2 sprays into each nostril daily for 365 doses, Disp: 16 g, Rfl: 11    Ibuprofen 200 MG CAPS, Take by mouth as needed, Disp: , Rfl:     MILK THISTLE PO, Take 1 tablet by mouth daily, Disp: , Rfl:     Multiple Vitamins-Minerals (MULTIVITAMIN ADULT PO), Take 1 tablet by mouth daily, Disp: , Rfl:     olopatadine HCl (PATADAY) 0 2 % opth drops, , Disp: , Rfl:     Probiotic Product (PROBIOTIC-10 PO), Take by mouth in the morning  , Disp: , Rfl:     tamoxifen (NOLVADEX) 20 mg tablet, Take 1 tablet (20 mg total) by mouth daily, Disp: 90 tablet, Rfl: 2  Allergies   Allergen Reactions    Other Other (See Comments)     Scallops-pt states she has swelling and vomiting  Also seasonal          Review of Systems   Constitutional: Negative    Negative for fatigue  HENT: Negative  Eyes: Negative  Respiratory: Negative  Cardiovascular: Negative  Gastrointestinal: Positive for constipation (intermittent)  Occasional bloating   Endocrine: Negative  Genitourinary: Negative  Negative for difficulty urinating  Musculoskeletal: Negative  Skin: Negative  Allergic/Immunologic: Negative  Neurological: Negative  Hematological: Negative  Negative for adenopathy  Psychiatric/Behavioral: Negative  OBJECTIVE:   /76 (BP Location: Left arm, Patient Position: Sitting)   Pulse 75   Temp 98 1 °F (36 7 °C) (Temporal)   Resp 16   Ht 5' 7" (1 702 m)   Wt 55 7 kg (122 lb 12 7 oz)   SpO2 99%   BMI 19 23 kg/m²   Pain Assessment:  0  ECOG/Zubrod/WHO: 0 - Asymptomatic    Physical Exam  Constitutional:       Appearance: She is well-developed  HENT:      Head: Normocephalic  Hair is normal    Eyes:      General: No scleral icterus  Cardiovascular:      Rate and Rhythm: Normal rate and regular rhythm  Pulmonary:      Effort: Pulmonary effort is normal  No respiratory distress  Breath sounds: Normal breath sounds  No wheezing  Chest:      Chest wall: No tenderness  Comments: There is no supraclavicular axillary adenopathy palpable  No suspicious lesions palpable in either breast   The skin in the radiated field is in good condition  No breast or arm edema  Abdominal:      General: Bowel sounds are normal  There is no distension  Palpations: Abdomen is soft  There is no mass  Tenderness: There is no abdominal tenderness  There is no rebound  Genitourinary:     Vagina: Normal    Musculoskeletal:         General: No tenderness  Normal range of motion  Cervical back: Neck supple  No edema  No spinous process tenderness  Lymphadenopathy:      Cervical: No cervical adenopathy  Neurological:      Mental Status: She is alert  Cranial Nerves: No cranial nerve deficit        Coordination: Coordination normal       Gait: Gait normal    Psychiatric:         Speech: Speech normal          Behavior: Behavior normal              RESULTS    Lab Results: No results found for this or any previous visit (from the past 672 hour(s))  Imaging Studies:No results found  Assessment/Plan:  No orders of the defined types were placed in this encounter  Enriqueta Tran is a 49y o  year old female who is 5 years post adjuvant radiation therapy for left breast carcinoma  She has no clinical evidence of recurrence  She underwent mammogram September and MRI of the breast in the summer  She continues on tamoxifen and will be following with Medical Oncology  I have not made any further follow-up appointments for her as she will be seeing Surgical Oncology  I will be happy to see her in the future should the need arise  Mila Fink MD  1/25/2022,8:39 AM    Portions of the record may have been created with voice recognition software   Occasional wrong word or "sound a like" substitutions may have occurred due to the inherent limitations of voice recognition software   Read the chart carefully and recognize, using context, where substitutions have occurred

## 2022-01-25 NOTE — PROGRESS NOTES
Courtney Rodriguez 1965 is a 49 y o  female     Follow up visit     Courtney Rodriguez is a 49year old female who is nearly 5 years post adjuvant radiation therapy for Stage IA  left breast carcinoma  She completed radiation to the left breast on 17  The pt was last seen in radiation on 21  She returns today for her follow up     21 - Hem OncLinnea  Pt remains in remission from breast cancer  Pt to continue on Tamoxifen  Follow up in 6 months    21 - Surg Nikki Wells  Pt remains on Tamoxifen  No evidence of disease  Follow up in 6 months      21 - MRI breast bilateral w and wo contrast w cad  IMPRESSION:   Benign findings as above  Patient will be due for annual mammography after 2021  Annual breast MRI screening can be considered if clinically indicated  ASSESSMENT/BI-RADS CATEGORY:  Left: 2 - Benign  Right: 1 - Negative  Overall: 2 - Benign    21 - Hem OncLinnea  Pt to continues on Tamoxifen  Follow up in 6 months     21 - Mammo diagnostic bilateral w 3d & cad  IMPRESSION:   Stable exam   This patient has been identified as being at elevated risk for development of breast cancer based on the Tyrer-Cuzick model  She may benefit from supplemental screening  ASSESSMENT/BI-RADS CATEGORY:  Overall: 2 - Benign     21 - Nikki Stringer  Pt continues on Tamoxifen  No evidence of disease  Follow up in 6 months       Upcomin22 - Hem OncLinnea  22 - MRI breast bilateral w and wo contrast w cad  22 - Nikki Stringer        Oncology History   Malignant neoplasm of upper-outer quadrant of left breast in female, estrogen receptor positive ([facility] [address] )   2016 Initial Diagnosis    Malignant neoplasm of upper-outer quadrant of left breast in female, estrogen receptor positive ([facility] [address] )     2016 Biopsy    Left breast biopsy: invasive and in situ ductal carcinoma      1/3/2017 Surgery    Left lumpectomy and sentinel node biopsy   Final staging: Stage IA IDC, strongly ER/FL +, HER2-     2017 Genetic Testing    Oncotype score was 10  BRCA test was negative  2/2017 -  Hormone Therapy    Tamoxifen      3/13/2017 - 4/24/2017 Radiation    left breast to a dose of 5000 cGy followed by an additional 1000 cGy to the primary site         Review of Systems:  Review of Systems   Constitutional: Negative  Negative for fatigue  HENT: Negative  Eyes: Negative  Respiratory: Negative  Cardiovascular: Negative  Gastrointestinal: Positive for constipation (intermittent)  Occasional bloating   Endocrine: Negative  Genitourinary: Negative  Negative for difficulty urinating  Musculoskeletal: Negative  Skin: Negative  Allergic/Immunologic: Negative  Neurological: Negative  Hematological: Negative  Negative for adenopathy  Psychiatric/Behavioral: Negative  Clinical Trial: no    Imaging:No results found      Teaching    Covid Vaccine Status: Pfizer x2    Health Maintenance   Topic Date Due    Hepatitis C Screening  Never done    Pneumococcal Vaccine: Pediatrics (0 to 5 Years) and At-Risk Patients (6 to 59 Years) (1 of 2 - PPSV23) Never done    HIV Screening  Never done    Osteoporosis Screening  Never done    Cervical Cancer Screening  05/01/2020    COVID-19 Vaccine (3 - Booster for Pfizer series) 10/14/2021    Depression Screening  01/26/2022    Annual Physical  02/08/2022    Breast Cancer Screening: Mammogram  09/08/2022    BMI: Adult  11/30/2022    Colorectal Cancer Screening  04/23/2023    DTaP,Tdap,and Td Vaccines (2 - Td or Tdap) 02/15/2026    Influenza Vaccine  Completed    HIB Vaccine  Aged Out    Hepatitis B Vaccine  Aged Out    IPV Vaccine  Aged Out    Hepatitis A Vaccine  Aged Out    Meningococcal ACWY Vaccine  Aged Out    HPV Vaccine  Aged Out     Patient Active Problem List   Diagnosis    Malignant neoplasm of upper-outer quadrant of left breast in female, estrogen receptor positive ([facility] [address]    Right lower quadrant pain    Rectal pain    External hemorrhoid    Narrowing of stools    Vaginal discharge    Encntr for gyn exam (general) (routine) w abnormal findings    Vaginal relaxation    Routine screening for STI (sexually transmitted infection)    Use of tamoxifen (Nolvadex)    Dense breast tissue    Varicose veins of right lower extremity with pain    Coccyx pain    Heartburn    Pelvic floor relaxation    Abdominal bloating    Abnormal uterine bleeding (AUB)    Menopause    Chronic seasonal allergic rhinitis due to pollen    Allergic rhinitis due to dogs     Past Medical History:   Diagnosis Date    Abdominal pain, left lower quadrant     Anxiety     BRCA negative     Common cold     Environmental and seasonal allergies     Exercises 5 to 6 times per week     Food allergy     scallops    Low back pain     Seasonal allergies     Use of tamoxifen (Nolvadex)     Vaginal discharge     Vaginal irritation     Vulvar burning     Vulvar irritation      Past Surgical History:   Procedure Laterality Date    APPENDECTOMY      BREAST BIOPSY Left 11/17/2016    U/S BX    COLONOSCOPY      MAMMO NEEDLE LOCALIZATION LEFT (ALL INC) Left 1/3/2017    [place] BIOPSY/EXCISION, LYMPH NODE(S) Left 1/3/2017    Procedure: BIOPSY LYMPH NODE SENTINEL @ [address];  Surgeon: Red Byers MD;  [facility];  Service: Surgical Oncology    TN MASTECTOMY, PARTIAL Left 1/3/2017    Procedure: LUMPECTOMY BREAST NEEDLE LOCALIZED @ 0930;  Surgeon: Red Byers MD;  [facility];  Service: Surgical Oncology    SENTINEL LYMPH NODE BIOPSY      US GUIDED BREAST BIOPSY LEFT COMPLETE Left 11/17/2016     Family History   Problem Relation Age of Onset    Lung cancer Mother         mother age 61   [facility] cancer Father         father age 66    Breast cancer Sister         age dx unk    No Known Problems Brother     No Known Problems Maternal Grandmother     No Known Problems Maternal Grandfather     No Known Problems Paternal Grandmother     No Known Problems Paternal Grandfather     No Known Problems Daughter     No Known Problems Daughter     No Known Problems Maternal Aunt     No Known Problems Maternal Aunt     Breast cancer Paternal Aunt         aunt 50    Other Half-Brother         Born no spleen -  if blood infection    Colon cancer Neg Hx          Current Outpatient Medications:     ALPRAZolam (XANAX) 0 25 mg tablet, Take 0 25 mg by mouth daily as needed for anxiety, Disp: , Rfl:     azelastine (ASTELIN) 0 1 % nasal spray, INSTILL 2 SPRAYS INTO EACH NOSTRIL 2TIMES DAILY AS NEEDED FOR RHINITIS/ALLERGIES, Disp: 1 Bottle, Rfl: 1    Calcium 500 MG tablet, Take 1 tablet by mouth daily, Disp: , Rfl:     COLLAGEN PO, Take 1 tablet by mouth daily, Disp: , Rfl:     fexofenadine (ALLEGRA) 180 MG tablet, Take 1 tablet (180 mg total) by mouth daily, Disp: 90 tablet, Rfl: 2    fluticasone (FLONASE) 50 mcg/act nasal spray, 2 sprays into each nostril daily for 365 doses, Disp: 16 g, Rfl: 11    Ibuprofen 200 MG CAPS, Take by mouth as needed, Disp: , Rfl:     MILK THISTLE PO, Take 1 tablet by mouth daily, Disp: , Rfl:     Multiple Vitamins-Minerals (MULTIVITAMIN ADULT PO), Take 1 tablet by mouth daily, Disp: , Rfl:     olopatadine HCl (PATADAY) 0 2 % opth drops, , Disp: , Rfl:     Probiotic Product (PROBIOTIC-10 PO), Take by mouth, Disp: , Rfl:     tamoxifen (NOLVADEX) 20 mg tablet, Take 1 tablet (20 mg total) by mouth daily, Disp: 90 tablet, Rfl: 2  Allergies   Allergen Reactions    Other Other (See Comments)     Scallops-pt states she has swelling and vomiting  Also seasonal      There were no vitals filed for this visit

## 2022-01-25 NOTE — LETTER
January 25, 2022     Chris Aggarwal MD  [address] [address]    Patient: Aylin Holder   YOB: 1965   Date of Visit: 1/25/2022       Dear Dr Ryder Vincent:    Thank you for referring Guillermo Basurto to me for evaluation  Below are my notes for this consultation  If you have questions, please do not hesitate to call me  I look forward to following your patient along with you  Sincerely,        Keisha Pool MD        CC: No Recipients  Keisha Pool MD  1/25/2022  8:42 AM  Sign when Signing Visit  Follow-up - Radiation Oncology   Aylin Holder 1965 49 y o  female [id number]      History of Present Illness   Cancer Staging  Malignant neoplasm of upper-outer quadrant of left breast in female, estrogen receptor positive ([facility] [address] )  Staging form: Breast, AJCC 7th Edition  - Clinical: Stage IA (T1c, N0, M0) - Signed by Keisha Pool MD on 5/7/2018  Laterality: Left  Histologic grade (G): G1  Estrogen receptor status: Positive  Progesterone receptor status: Positive  HER2 status: Negative  - Pathologic: Stage IA (T1c, N0, cM0) - Signed by Raul Orozco MD on 6/4/2018  Laterality: Left  Method of lymph node assessment: [place] lymph node biopsy  Histologic grade (G): G1  Estrogen receptor status: Positive  Percentage of positive estrogen receptors (%): 100  Progesterone receptor status: Positive  Percentage of positive progesterone receptors (%): 100  HER2 status: Negative  Multi-gene signature score: 10            Interval History:  Billy Jacobo is P 89 year old female who is nearly 5 years post adjuvant radiation therapy for Stage IA  left breast carcinoma  She completed radiation to the left breast on 4/24/17  The pt was last seen in radiation on 01/26/21   She returns today for her follow up      03/01/21 - Hem OncLinnea  Pt remains in remission from breast cancer  Pt to continue on Tamoxifen  Follow up in 6 months     04/26/21 - Surg OncSandra  Pt remains on Tamoxifen  No evidence of disease  Follow up in 6 months        07/31/21 - MRI breast bilateral w and wo contrast w cad  IMPRESSION:   Benign findings as above  Patient will be due for annual mammography after 08/24/2021  Annual breast MRI screening can be considered if clinically indicated  ASSESSMENT/BI-RADS CATEGORY:  Left: 2 - Benign  Right: 1 - Negative  Overall: 2 - Benign     08/30/21 - Hem Onc, Linnea  Pt to continues on Tamoxifen  Follow up in 6 months      09/08/21 - Mammo diagnostic bilateral w 3d & cad  IMPRESSION:   Stable exam   This patient has been identified as being at elevated risk for development of breast cancer based on the Tyrer-Cuzick model  She may benefit from supplemental screening  ASSESSMENT/BI-RADS CATEGORY:  Overall: 2 - Benign     11/08/21 - Surg OncDeyanira  Pt continues on Tamoxifen  No evidence of disease  Follow up in 6 months         Historical Information   Oncology History   Malignant neoplasm of upper-outer quadrant of left breast in female, estrogen receptor positive ([facility] [address] )   11/2016 Initial Diagnosis    Malignant neoplasm of upper-outer quadrant of left breast in female, estrogen receptor positive ([facility] [address] )     11/17/2016 Biopsy    Left breast biopsy: invasive and in situ ductal carcinoma      1/3/2017 Surgery    Left lumpectomy and sentinel node biopsy  Final staging: Stage IA IDC, strongly ER/NE +, HER2-     2017 Genetic Testing    Oncotype score was 10  BRCA test was negative       2/2017 -  Hormone Therapy    Tamoxifen      3/13/2017 - 4/24/2017 Radiation    left breast to a dose of 5000 cGy followed by an additional 1000 cGy to the primary site         Past Medical History:   Diagnosis Date    Abdominal pain, left lower quadrant     Anxiety     BRCA negative     Common cold     Environmental and seasonal allergies     Exercises 5 to 6 times per week     Food allergy     scallops    Low back pain     Seasonal allergies     Use of tamoxifen (Nolvadex)     Vaginal discharge     Vaginal irritation     Vulvar burning     Vulvar irritation      Past Surgical History:   Procedure Laterality Date    APPENDECTOMY      BREAST BIOPSY Left 11/17/2016    U/S BX    COLONOSCOPY      MAMMO NEEDLE LOCALIZATION LEFT (ALL INC) Left 1/3/2017    W[place] BIOPSY/EXCISION, LYMPH NODE(S) Left 1/3/2017    Procedure: BIOPSY LYMPH NODE SENTINEL @ [address];  Surgeon: Katlyn Joe MD;  Location: [facility];  Service: Surgical Oncology    W[place] MASTECTOMY, PARTIAL Left 1/3/2017    Procedure: LUMPECTOMY BREAST NEEDLE LOCALIZED @ 0930;  Surgeon: Katlyn Joe MD;  Location: [facility];  Service: Surgical Oncology    SENTINEL LYMPH NODE BIOPSY      US GUIDED BREAST BIOPSY LEFT COMPLETE Left 11/17/2016       Social History   Social History     Substance and Sexual Activity   Alcohol Use Yes    Comment: 5 beverages/wk     Social History     Substance and Sexual Activity   Drug Use Never     Social History     Tobacco Use   Smoking Status Current Some Day Smoker    Types: Cigarettes   Smokeless Tobacco Never Used   Tobacco Comment    occasionally/socially smokes - approx 2/wk         Meds/Allergies     Current Outpatient Medications:     ALPRAZolam (XANAX) 0 25 mg tablet, Take 0 25 mg by mouth daily as needed for anxiety, Disp: , Rfl:     azelastine (ASTELIN) 0 1 % nasal spray, INSTILL 2 SPRAYS INTO EACH NOSTRIL 2TIMES DAILY AS NEEDED FOR RHINITIS/ALLERGIES, Disp: 1 Bottle, Rfl: 1    Calcium 500 MG tablet, Take 1 tablet by mouth daily, Disp: , Rfl:     COLLAGEN PO, Take 1 tablet by mouth daily, Disp: , Rfl:     fexofenadine (ALLEGRA) 180 MG tablet, Take 1 tablet (180 mg total) by mouth daily, Disp: 90 tablet, Rfl: 2    fluticasone (FLONASE) 50 mcg/act nasal spray, 2 sprays into each nostril daily for 365 doses, Disp: 16 g, Rfl: 11    Ibuprofen 200 MG CAPS, Take by mouth as needed, Disp: , Rfl:     MILK THISTLE PO, Take 1 tablet by mouth daily, Disp: , Rfl:    Multiple Vitamins-Minerals (MULTIVITAMIN ADULT PO), Take 1 tablet by mouth daily, Disp: , Rfl:     olopatadine HCl (PATADAY) 0 2 % opth drops, , Disp: , Rfl:     Probiotic Product (PROBIOTIC-10 PO), Take by mouth in the morning  , Disp: , Rfl:     tamoxifen (NOLVADEX) 20 mg tablet, Take 1 tablet (20 mg total) by mouth daily, Disp: 90 tablet, Rfl: 2  Allergies   Allergen Reactions    Other Other (See Comments)     Scallops-pt states she has swelling and vomiting  Also seasonal          Review of Systems   Constitutional: Negative  Negative for fatigue  HENT: Negative  Eyes: Negative  Respiratory: Negative  Cardiovascular: Negative  Gastrointestinal: Positive for constipation (intermittent)  Occasional bloating   Endocrine: Negative  Genitourinary: Negative  Negative for difficulty urinating  Musculoskeletal: Negative  Skin: Negative  Allergic/Immunologic: Negative  Neurological: Negative  Hematological: Negative  Negative for adenopathy  Psychiatric/Behavioral: Negative  OBJECTIVE:   /76 (BP Location: Left arm, Patient Position: Sitting)   Pulse 75   Temp 98 1 °F (36 7 °C) (Temporal)   Resp 16   Ht 5' 7" (1 702 m)   Wt 55 7 kg (122 lb 12 7 oz)   SpO2 99%   BMI 19 23 kg/m²   Pain Assessment:  0  ECOG/Zubrod/WHO: 0 - Asymptomatic    Physical Exam  Constitutional:       Appearance: She is well-developed  HENT:      Head: Normocephalic  Hair is normal    Eyes:      General: No scleral icterus  Cardiovascular:      Rate and Rhythm: Normal rate and regular rhythm  Pulmonary:      Effort: Pulmonary effort is normal  No respiratory distress  Breath sounds: Normal breath sounds  No wheezing  Chest:      Chest wall: No tenderness  Comments: There is no supraclavicular axillary adenopathy palpable  No suspicious lesions palpable in either breast   The skin in the radiated field is in good condition    No breast or arm edema  Abdominal: General: Bowel sounds are normal  There is no distension  Palpations: Abdomen is soft  There is no mass  Tenderness: There is no abdominal tenderness  There is no rebound  Genitourinary:     Vagina: Normal    Musculoskeletal:         General: No tenderness  Normal range of motion  Cervical back: Neck supple  No edema  No spinous process tenderness  Lymphadenopathy:      Cervical: No cervical adenopathy  Neurological:      Mental Status: She is alert  Cranial Nerves: No cranial nerve deficit  Coordination: Coordination normal       Gait: Gait normal    Psychiatric:         Speech: Speech normal          Behavior: Behavior normal              RESULTS    Lab Results: No results found for this or any previous visit (from the past 672 hour(s))  Imaging Studies:No results found  Assessment/Plan:  No orders of the defined types were placed in this encounter  Gloria Mora is a 49y o  year old female who is 5 years post adjuvant radiation therapy for left breast carcinoma  She has no clinical evidence of recurrence  She underwent mammogram September and MRI of the breast in the summer  She continues on tamoxifen and will be following with Medical Oncology  I have not made any further follow-up appointments for her as she will be seeing Surgical Oncology  I will be happy to see her in the future should the need arise  Bismark Dominguez MD  1/25/2022,8:39 AM    Portions of the record may have been created with voice recognition software   Occasional wrong word or "sound a like" substitutions may have occurred due to the inherent limitations of voice recognition software   Read the chart carefully and recognize, using context, where substitutions have occurred

## 2022-02-11 PROBLEM — Z85.3 HISTORY OF LEFT BREAST CANCER: Status: ACTIVE | Noted: 2022-02-11

## 2022-02-11 PROBLEM — K59.00 CONSTIPATION: Status: ACTIVE | Noted: 2022-02-11

## 2022-02-11 PROBLEM — Z01.419 ENCOUNTER FOR ANNUAL ROUTINE GYNECOLOGICAL EXAMINATION: Status: ACTIVE | Noted: 2022-02-11

## 2022-02-11 PROBLEM — Z80.3 FAMILY HISTORY OF BREAST CANCER IN SISTER: Status: ACTIVE | Noted: 2022-02-11

## 2022-02-11 PROBLEM — R92.2 DENSE BREAST TISSUE ON MAMMOGRAM: Status: ACTIVE | Noted: 2022-02-11

## 2022-02-11 PROBLEM — R92.30 DENSE BREAST TISSUE ON MAMMOGRAM: Status: ACTIVE | Noted: 2022-02-11

## 2022-02-11 PROBLEM — N81.11 CYSTOCELE, MIDLINE: Status: ACTIVE | Noted: 2022-02-11

## 2022-02-11 PROBLEM — N81.2 FIRST DEGREE UTERINE PROLAPSE: Status: ACTIVE | Noted: 2022-02-11

## 2022-02-11 PROBLEM — Z12.4 SCREENING FOR CERVICAL CANCER: Status: ACTIVE | Noted: 2022-02-11

## 2022-02-11 PROBLEM — N81.6 RECTOCELE: Status: ACTIVE | Noted: 2022-02-11

## 2022-02-11 PROBLEM — D25.0 SUBMUCOUS LEIOMYOMA OF UTERUS: Status: ACTIVE | Noted: 2022-02-11

## 2022-02-11 NOTE — PROGRESS NOTES
Assessment/Plan:  NGE                                                                                                               Uterine prolapse, rectocele with constipation, cystocele- 2 courses of pelvic physical therapy  Recommend Colace daily [she takes MOM 3x/wk]  L Breast Ca- fu w Dr Aminah Nascimento   Dx'c M 9/21- Dense Breasts, T-C 20 53 %- MRI planned  Co- Testing q 5 yrs  - now                                                                                RTO 1 yr  SBA monthly  3 D Mammography   Colonoscopy  47 - '18, due '23  Exercise 3/wk    -compliant              Calcium 1,000 mg/d with Vit D - compliant    Depression Screen: Neg       Diagnoses and all orders for this visit:    Encounter for annual routine gynecological examination  -     Liquid-based pap, screening    First degree uterine prolapse    Rectocele    Constipation, unspecified constipation type    Cystocele, midline    Submucous leiomyoma of uterus    History of left breast cancer    Dense breast tissue on mammogram    Family history of breast cancer in sister    Screening for cervical cancer  -     Liquid-based pap, screening              Subjective:        Patient ID: Samara Villatoro is a 49 y o  female  Aminah Nascimento presents today for an annual visit  She still has complaints related to her rectocele  She did do pelvic physical therapy  She still experiences constipation and uses milk a magnesia 3 times a week  Sometimes she has to strain to have a bowel movement  In January she completed 5 years of tamoxifen therapy  However, she was not compliant with the medication  She experienced night sweats  Her last menstrual period was 3 months ago  She is having intercourse twice a month        The following portions of the patient's history were reviewed and updated as appropriate: She  has a past medical history of Abdominal pain, left lower quadrant, Anxiety, BRCA negative, Common cold, Environmental and seasonal allergies, Exercises 5 to 6 times per week, Food allergy, Low back pain, Seasonal allergies, Use of tamoxifen (Nolvadex), Vaginal discharge, Vaginal irritation, Vulvar burning, and Vulvar irritation    Patient Active Problem List    Diagnosis Date Noted    Encounter for annual routine gynecological examination 2022    First degree uterine prolapse 2022    Rectocele 2022    Constipation 2022    Cystocele, midline 2022    Submucous leiomyoma of uterus 2022    History of left breast cancer 2022    Family history of breast cancer in sister 2022    Screening for cervical cancer 2022    Dense breast tissue on mammogram 2022    Chronic seasonal allergic rhinitis due to pollen 2021    Allergic rhinitis due to dogs 2021    Menopause 2021    Abnormal uterine bleeding (AUB) 2020    Abdominal bloating 2019    Pelvic floor relaxation 2019    Heartburn 2019    Coccyx pain 2018    Varicose veins of right lower extremity with pain 2018    Dense breast tissue 2018    Use of tamoxifen (Nolvadex)     Encntr for gyn exam (general) (routine) w abnormal findings 2018    Vaginal relaxation 2018    Routine screening for STI (sexually transmitted infection) 2018    Vaginal discharge 2018    Right lower quadrant pain 2018    Rectal pain 2018    External hemorrhoid 2018    Narrowing of stools 2018    Malignant neoplasm of upper-outer quadrant of left breast in female, estrogen receptor positive ([facility] [address] ) 2017   PMH:   2 para 2;  x 2; term  FH breast cancer- Sr   age 46      BRCA negative      Menorrhagia '13      L Breast Ca  left lumpectomy and radiation      Anxiety      Carpal Tunnel       Pelvic floor relaxation- - referred for physical therapy, only went to 2 visits  Blepharoplasty-planned for   She  has a past surgical history that includes Appendectomy; pr biopsy/excision, lymph node(s) (Left, 1/3/2017); Colonoscopy; [place] lymph node biopsy; US guided breast biopsy left complete (Left, 2016); Mammo needle localization left (all inc) (Left, 1/3/2017); Breast biopsy (Left, 2016); and pr mastectomy, partial (Left, 1/3/2017)  Her family history includes Breast cancer in her paternal aunt and sister; Lung cancer in her father and mother; No Known Problems in her brother, daughter, daughter, maternal aunt, maternal aunt, maternal grandfather, maternal grandmother, paternal grandfather, and paternal grandmother; Other in her half-brother  She  reports that she has been smoking cigarettes  She has never used smokeless tobacco  She reports current alcohol use  She reports that she does not use drugs  SH:  Lives in [facility]  She has been working from home during the pandemic  Oldest daughter Mook Arenas  I delivered [place]  She is working in [employer]  Her mother-in-law  '24    Current Outpatient Medications   Medication Sig Dispense Refill    ALPRAZolam (XANAX) 0 25 mg tablet Take 0 25 mg by mouth daily as needed for anxiety      azelastine (ASTELIN) 0 1 % nasal spray INSTILL 2 SPRAYS INTO EACH NOSTRIL 2TIMES DAILY AS NEEDED FOR RHINITIS/ALLERGIES 1 Bottle 1    Calcium 500 MG tablet Take 1 tablet by mouth daily      COLLAGEN PO Take 1 tablet by mouth daily      fexofenadine (ALLEGRA) 180 MG tablet Take 1 tablet (180 mg total) by mouth daily 90 tablet 2    fluticasone (FLONASE) 50 mcg/act nasal spray 2 sprays into each nostril daily for 365 doses 16 g 11    Ibuprofen 200 MG CAPS Take by mouth as needed      MILK THISTLE PO Take 1 tablet by mouth daily      Multiple Vitamins-Minerals (MULTIVITAMIN ADULT PO) Take 1 tablet by mouth daily      olopatadine HCl (PATADAY) 0 2 % opth drops       Probiotic Product (PROBIOTIC-10 PO) Take by mouth in the morning        tamoxifen (NOLVADEX) 20 mg tablet Take 1 tablet (20 mg total) by mouth daily 90 tablet 2     No current facility-administered medications for this visit  Current Outpatient Medications on File Prior to Visit   Medication Sig    ALPRAZolam (XANAX) 0 25 mg tablet Take 0 25 mg by mouth daily as needed for anxiety    azelastine (ASTELIN) 0 1 % nasal spray INSTILL 2 SPRAYS INTO EACH NOSTRIL 2TIMES DAILY AS NEEDED FOR RHINITIS/ALLERGIES    Calcium 500 MG tablet Take 1 tablet by mouth daily    COLLAGEN PO Take 1 tablet by mouth daily    fexofenadine (ALLEGRA) 180 MG tablet Take 1 tablet (180 mg total) by mouth daily    fluticasone (FLONASE) 50 mcg/act nasal spray 2 sprays into each nostril daily for 365 doses    Ibuprofen 200 MG CAPS Take by mouth as needed    MILK THISTLE PO Take 1 tablet by mouth daily    Multiple Vitamins-Minerals (MULTIVITAMIN ADULT PO) Take 1 tablet by mouth daily    olopatadine HCl (PATADAY) 0 2 % opth drops     Probiotic Product (PROBIOTIC-10 PO) Take by mouth in the morning      tamoxifen (NOLVADEX) 20 mg tablet Take 1 tablet (20 mg total) by mouth daily     No current facility-administered medications on file prior to visit  She is allergic to other       Review of Systems   Constitutional: Negative for activity change, appetite change, fatigue and unexpected weight change  Eyes: Negative for visual disturbance  Respiratory: Negative for cough, chest tightness, shortness of breath and wheezing  Cardiovascular: Negative for chest pain, palpitations and leg swelling  Breast: Patient denies tenderness, nipple discharge, masses, or erythema  Gastrointestinal: Negative for abdominal distention, abdominal pain, blood in stool, constipation, diarrhea, nausea and vomiting  Endocrine: Negative for cold intolerance and heat intolerance     Genitourinary: Negative for decreased urine volume, difficulty urinating, dyspareunia, dysuria, frequency, hematuria, menstrual problem, pelvic pain, urgency, vaginal bleeding, vaginal discharge and vaginal pain  No incontinence  [place] twice a month  No rectal urgency, excessive flatus, or incontinence  Occasionally strains  She notes rectal pressure if she has not had a bowel movement   Musculoskeletal: Negative for arthralgias  Skin: Negative for rash  Neurological: Negative for weakness, light-headedness, numbness and headaches  Hematological: Does not bruise/bleed easily  Psychiatric/Behavioral: Negative for agitation, behavioral problems and sleep disturbance  The patient is not nervous/anxious  Objective:    Vitals:    02/14/22 0944   BP: 124/70   Weight: 55 8 kg (123 lb)   Height: 5' 6" (1 676 m)            Physical Exam  Vitals and nursing note reviewed  Exam conducted with a chaperone present  Constitutional:       Appearance: Normal appearance  She is well-developed  HENT:      Head: Normocephalic and atraumatic  Eyes:      General: No scleral icterus  Right eye: No discharge  Left eye: No discharge  Extraocular Movements: Extraocular movements intact  Conjunctiva/sclera: Conjunctivae normal    Neck:      Thyroid: No thyromegaly  Trachea: No tracheal deviation  Cardiovascular:      Rate and Rhythm: Normal rate and regular rhythm  Heart sounds: Normal heart sounds  No murmur heard  Pulmonary:      Effort: Pulmonary effort is normal  No respiratory distress  Breath sounds: Normal breath sounds  No wheezing  Chest:   Breasts: Breasts are symmetrical       Right: No inverted nipple, mass, nipple discharge, skin change or tenderness  Left: No inverted nipple, mass, nipple discharge, skin change or tenderness  Abdominal:      General: Bowel sounds are normal  There is no distension  Palpations: Abdomen is soft  There is no mass  Tenderness: There is no abdominal tenderness   There is no guarding or rebound  Genitourinary:     General: Normal vulva  Exam position: Supine  Labia:         Right: No rash, tenderness or lesion  Left: No rash, tenderness or lesion  Vagina: Normal       Cervix: No cervical motion tenderness or discharge  Uterus: Not deviated, not enlarged and not tender  Adnexa:         Right: No mass, tenderness or fullness  Left: No mass, tenderness or fullness  Rectum: No external hemorrhoid  Comments: Urethral meatus within normal limits  Perineum within normal limits  Mild vaginal atrophy, Minimal cystocele, first-degree rectocele  Previously seen uterine prolapse is not evident today  The uterus is anteverted and small  There is no suggestion of a fibroid  Musculoskeletal:         General: Normal range of motion  Cervical back: Normal range of motion and neck supple  Skin:     General: Skin is warm and dry  Neurological:      Mental Status: She is alert and oriented to person, place, and time  Psychiatric:         Mood and Affect: Mood normal          Behavior: Behavior normal          Thought Content:  Thought content normal          Judgment: Judgment normal

## 2022-02-14 ENCOUNTER — ANNUAL EXAM (OUTPATIENT)
Dept: OBGYN CLINIC | Facility: CLINIC | Age: 57
End: 2022-02-14
Payer: COMMERCIAL

## 2022-02-14 VITALS
WEIGHT: 123 LBS | BODY MASS INDEX: 19.77 KG/M2 | HEIGHT: 66 IN | DIASTOLIC BLOOD PRESSURE: 70 MMHG | SYSTOLIC BLOOD PRESSURE: 124 MMHG

## 2022-02-14 DIAGNOSIS — D25.0 SUBMUCOUS LEIOMYOMA OF UTERUS: ICD-10-CM

## 2022-02-14 DIAGNOSIS — N81.11 CYSTOCELE, MIDLINE: ICD-10-CM

## 2022-02-14 DIAGNOSIS — R92.2 DENSE BREAST TISSUE ON MAMMOGRAM: ICD-10-CM

## 2022-02-14 DIAGNOSIS — Z01.419 ENCOUNTER FOR ANNUAL ROUTINE GYNECOLOGICAL EXAMINATION: Primary | ICD-10-CM

## 2022-02-14 DIAGNOSIS — Z12.4 SCREENING FOR CERVICAL CANCER: ICD-10-CM

## 2022-02-14 DIAGNOSIS — K59.00 CONSTIPATION, UNSPECIFIED CONSTIPATION TYPE: ICD-10-CM

## 2022-02-14 DIAGNOSIS — N81.6 RECTOCELE: ICD-10-CM

## 2022-02-14 DIAGNOSIS — N81.2 FIRST DEGREE UTERINE PROLAPSE: ICD-10-CM

## 2022-02-14 DIAGNOSIS — Z85.3 HISTORY OF LEFT BREAST CANCER: ICD-10-CM

## 2022-02-14 DIAGNOSIS — Z80.3 FAMILY HISTORY OF BREAST CANCER IN SISTER: ICD-10-CM

## 2022-02-14 PROCEDURE — G0145 SCR C/V CYTO,THINLAYER,RESCR: HCPCS | Performed by: OBSTETRICS & GYNECOLOGY

## 2022-02-14 PROCEDURE — G0476 HPV COMBO ASSAY CA SCREEN: HCPCS | Performed by: OBSTETRICS & GYNECOLOGY

## 2022-02-14 PROCEDURE — 99396 PREV VISIT EST AGE 40-64: CPT | Performed by: OBSTETRICS & GYNECOLOGY

## 2022-02-17 LAB
HPV HR 12 DNA CVX QL NAA+PROBE: NEGATIVE
HPV16 DNA CVX QL NAA+PROBE: NEGATIVE
HPV18 DNA CVX QL NAA+PROBE: NEGATIVE

## 2022-02-21 LAB
LAB AP GYN PRIMARY INTERPRETATION: NORMAL
Lab: NORMAL

## 2022-04-13 ENCOUNTER — APPOINTMENT (OUTPATIENT)
Dept: LAB | Facility: HOSPITAL | Age: 57
End: 2022-04-13
Attending: INTERNAL MEDICINE
Payer: COMMERCIAL

## 2022-04-13 ENCOUNTER — OFFICE VISIT (OUTPATIENT)
Dept: LAB | Facility: HOSPITAL | Age: 57
End: 2022-04-13
Attending: SURGERY
Payer: COMMERCIAL

## 2022-04-13 DIAGNOSIS — Z01.812 PRE-OPERATIVE LABORATORY EXAMINATION: ICD-10-CM

## 2022-04-13 DIAGNOSIS — H02.31 BLEPHAROCHALASIS OF RIGHT UPPER EYELID: ICD-10-CM

## 2022-04-13 DIAGNOSIS — Z17.0 MALIGNANT NEOPLASM OF UPPER-OUTER QUADRANT OF LEFT BREAST IN FEMALE, ESTROGEN RECEPTOR POSITIVE (HCC): ICD-10-CM

## 2022-04-13 DIAGNOSIS — H02.35 BLEPHAROCHALASIS OF LEFT LOWER EYELID: ICD-10-CM

## 2022-04-13 DIAGNOSIS — Z01.810 PRE-OPERATIVE CARDIOVASCULAR EXAMINATION: ICD-10-CM

## 2022-04-13 DIAGNOSIS — H02.32 BLEPHAROCHALASIS OF RIGHT LOWER EYELID: ICD-10-CM

## 2022-04-13 DIAGNOSIS — H02.34 BLEPHAROCHALASIS OF LEFT UPPER EYELID: ICD-10-CM

## 2022-04-13 DIAGNOSIS — H02.31 BLEPHAROCHALASIS OF RIGHT UPPER EYELID: Primary | ICD-10-CM

## 2022-04-13 DIAGNOSIS — C50.412 MALIGNANT NEOPLASM OF UPPER-OUTER QUADRANT OF LEFT BREAST IN FEMALE, ESTROGEN RECEPTOR POSITIVE (HCC): ICD-10-CM

## 2022-04-13 DIAGNOSIS — Z01.818 OTHER SPECIFIED PRE-OPERATIVE EXAMINATION: ICD-10-CM

## 2022-04-13 LAB
ALBUMIN SERPL BCP-MCNC: 4.3 G/DL (ref 3.5–5)
ALP SERPL-CCNC: 60 U/L (ref 46–116)
ALT SERPL W P-5'-P-CCNC: 25 U/L (ref 12–78)
ANION GAP SERPL CALCULATED.3IONS-SCNC: 2 MMOL/L (ref 4–13)
AST SERPL W P-5'-P-CCNC: 21 U/L (ref 5–45)
ATRIAL RATE: 81 BPM
BASOPHILS # BLD AUTO: 0.04 THOUSANDS/ΜL (ref 0–0.1)
BASOPHILS NFR BLD AUTO: 1 % (ref 0–1)
BILIRUB SERPL-MCNC: 0.75 MG/DL (ref 0.2–1)
BUN SERPL-MCNC: 12 MG/DL (ref 5–25)
CALCIUM SERPL-MCNC: 10.2 MG/DL (ref 8.3–10.1)
CANCER AG27-29 SERPL-ACNC: 15.5 U/ML (ref 0–42.3)
CHLORIDE SERPL-SCNC: 107 MMOL/L (ref 100–108)
CO2 SERPL-SCNC: 31 MMOL/L (ref 21–32)
CREAT SERPL-MCNC: 0.72 MG/DL (ref 0.6–1.3)
EOSINOPHIL # BLD AUTO: 0.06 THOUSAND/ΜL (ref 0–0.61)
EOSINOPHIL NFR BLD AUTO: 1 % (ref 0–6)
ERYTHROCYTE [DISTWIDTH] IN BLOOD BY AUTOMATED COUNT: 13.5 % (ref 11.6–15.1)
GFR SERPL CREATININE-BSD FRML MDRD: 95 ML/MIN/1.73SQ M
GLUCOSE P FAST SERPL-MCNC: 90 MG/DL (ref 65–99)
HCT VFR BLD AUTO: 40.8 % (ref 34.8–46.1)
HGB BLD-MCNC: 13.4 G/DL (ref 11.5–15.4)
IMM GRANULOCYTES # BLD AUTO: 0.02 THOUSAND/UL (ref 0–0.2)
IMM GRANULOCYTES NFR BLD AUTO: 0 % (ref 0–2)
LYMPHOCYTES # BLD AUTO: 1.18 THOUSANDS/ΜL (ref 0.6–4.47)
LYMPHOCYTES NFR BLD AUTO: 23 % (ref 14–44)
MCH RBC QN AUTO: 29.8 PG (ref 26.8–34.3)
MCHC RBC AUTO-ENTMCNC: 32.8 G/DL (ref 31.4–37.4)
MCV RBC AUTO: 91 FL (ref 82–98)
MONOCYTES # BLD AUTO: 0.62 THOUSAND/ΜL (ref 0.17–1.22)
MONOCYTES NFR BLD AUTO: 12 % (ref 4–12)
NEUTROPHILS # BLD AUTO: 3.13 THOUSANDS/ΜL (ref 1.85–7.62)
NEUTS SEG NFR BLD AUTO: 63 % (ref 43–75)
NRBC BLD AUTO-RTO: 0 /100 WBCS
P AXIS: 81 DEGREES
PLATELET # BLD AUTO: 251 THOUSANDS/UL (ref 149–390)
PMV BLD AUTO: 12.1 FL (ref 8.9–12.7)
POTASSIUM SERPL-SCNC: 4.2 MMOL/L (ref 3.5–5.3)
PR INTERVAL: 124 MS
PROT SERPL-MCNC: 7.3 G/DL (ref 6.4–8.2)
QRS AXIS: 85 DEGREES
QRSD INTERVAL: 84 MS
QT INTERVAL: 400 MS
QTC INTERVAL: 464 MS
RBC # BLD AUTO: 4.49 MILLION/UL (ref 3.81–5.12)
SODIUM SERPL-SCNC: 140 MMOL/L (ref 136–145)
T WAVE AXIS: 41 DEGREES
VENTRICULAR RATE: 81 BPM
WBC # BLD AUTO: 5.05 THOUSAND/UL (ref 4.31–10.16)

## 2022-04-13 PROCEDURE — 86300 IMMUNOASSAY TUMOR CA 15-3: CPT

## 2022-04-13 PROCEDURE — 93010 ELECTROCARDIOGRAM REPORT: CPT | Performed by: INTERNAL MEDICINE

## 2022-04-13 PROCEDURE — 93005 ELECTROCARDIOGRAM TRACING: CPT

## 2022-04-13 PROCEDURE — 85025 COMPLETE CBC W/AUTO DIFF WBC: CPT

## 2022-04-13 PROCEDURE — 36415 COLL VENOUS BLD VENIPUNCTURE: CPT

## 2022-04-13 PROCEDURE — 80053 COMPREHEN METABOLIC PANEL: CPT

## 2022-04-15 PROBLEM — H02.833 DERMATOCHALASIS OF BOTH EYELIDS: Status: ACTIVE | Noted: 2022-04-15

## 2022-04-15 PROBLEM — H02.836 DERMATOCHALASIS OF BOTH EYELIDS: Status: ACTIVE | Noted: 2022-04-15

## 2022-04-19 NOTE — PRE-PROCEDURE INSTRUCTIONS
Pre-Surgery Instructions:   Medication Instructions    ALPRAZolam (XANAX) 0 25 mg tablet Take day of surgery   azelastine (ASTELIN) 0 1 % nasal spray Hold day of surgery   Calcium 500 MG tablet Stop taking 7 days prior to surgery   COLLAGEN PO Stop taking 7 days prior to surgery   fexofenadine (ALLEGRA) 180 MG tablet Hold day of surgery   fluticasone (FLONASE) 50 mcg/act nasal spray Hold day of surgery   Ibuprofen 200 MG CAPS Stop taking 7 days prior to surgery   Multiple Vitamins-Minerals (MULTIVITAMIN ADULT PO) Stop taking 7 days prior to surgery   olopatadine HCl (PATADAY) 0 2 % opth drops Hold day of surgery   Probiotic Product (PROBIOTIC-10 PO) Stop taking 7 days prior to surgery  -pt may need to take if symptomatic        NPO after midnight, meds in am with sips of water  Understands when to expect preop phone call  Remove all jewelry  Advised of visitation policy  Before your operation, you play an important role in decreasing your risk for infection by washing with special antiseptic soap  This is an effective way to reduce bacteria on the skin which may help to prevent infections at the surgical site  Please read the following directions in advance  1  In the week before your operation purchase a 4 ounce bottle of antiseptic soap containing chlorhexidine gluconate 4%  Some brand names include: Aplicare, Endure, and Hibiclens  The cost is usually less than $5 00  · For your convenience, the [address] [place] [address] carries the soap  · It may also be available at your doctor's office or pre-admission testing center, and at most retail pharmacies  · If you are allergic or sensitive to soaps containing chlorhexidine gluconate (CHG), please let your doctor know so another antiseptic soap can be suggested  · CHG antiseptic soap is for external use only  2      The day before your operation follow these directions carefully to get ready    · Place clean lines (sheets) on your bed; you should sleep on clean sheets after your evening shower  · Get clean towels and washcloths ready - you need enough for 2 showers  · Set aside clean underwear, pajamas, and clothing to wear after the shower  Reminders:  · DO NOT use any other soap or body rinse on your skin during or after the antiseptic showers  · DO NOT use lotion , powder, deodorant, or perfume/aftershave of any kind on your skin after your antiseptic shower  · DO NOT shave any body parts in the 24 hours/the day before your operation  · DO NOT get the antiseptic soap in your eyes, ears, nose, mouth, or vaginal area  3      You will need to shower the night before AND the morning of your Surgery  Shower 1:  · The evening before your operation, take the fist shower  · First, shampoo your hair with regular shampoo and rinse it completely before you use the anitseptic soap  After washing and rinsing your hair, rinse your body  · Next, use a clean wash cloth to apply the antiseptic soap and wash your body from the neck down to your toes using 1/2 bottle of the antiseptic soap  You will use the other 1/2 bottle for the second shower  · Clean the area where your incision will be; later this area well for about 2 minutes  · If you ar having head or neck surgery, wash areas with the antiseptic soap  · Rinse yourself completely with running water  · Use a clean towel to dry off  · Wear clean underwear and clothing/pajamas  Shower 2:  · The Morning of your operation, take the second shower following the same steps as Shower 1 using the second 1/2 of the bottle of antiseptic soap  · Use clean cloths and towels to was and dry yourself off  · Wear clean underwear and clothing

## 2022-04-21 ENCOUNTER — ANESTHESIA EVENT (OUTPATIENT)
Dept: PERIOP | Facility: HOSPITAL | Age: 57
End: 2022-04-21
Payer: COMMERCIAL

## 2022-04-22 ENCOUNTER — HOSPITAL ENCOUNTER (OUTPATIENT)
Facility: HOSPITAL | Age: 57
Setting detail: OUTPATIENT SURGERY
Discharge: HOME | End: 2022-04-22
Attending: SURGERY | Admitting: SURGERY
Payer: COMMERCIAL

## 2022-04-22 ENCOUNTER — ANESTHESIA (OUTPATIENT)
Dept: PERIOP | Facility: HOSPITAL | Age: 57
End: 2022-04-22
Payer: COMMERCIAL

## 2022-04-22 VITALS
HEART RATE: 87 BPM | TEMPERATURE: 97 F | DIASTOLIC BLOOD PRESSURE: 59 MMHG | OXYGEN SATURATION: 97 % | RESPIRATION RATE: 20 BRPM | SYSTOLIC BLOOD PRESSURE: 99 MMHG

## 2022-04-22 PROBLEM — R11.2 PONV (POSTOPERATIVE NAUSEA AND VOMITING): Status: ACTIVE | Noted: 2022-04-22

## 2022-04-22 PROBLEM — Z98.890 PONV (POSTOPERATIVE NAUSEA AND VOMITING): Status: ACTIVE | Noted: 2022-04-22

## 2022-04-22 PROBLEM — F41.9 ANXIETY: Status: ACTIVE | Noted: 2022-04-22

## 2022-04-22 LAB
EXT PREGNANCY TEST URINE: NEGATIVE
EXT. CONTROL: NORMAL

## 2022-04-22 PROCEDURE — 81025 URINE PREGNANCY TEST: CPT | Performed by: ANESTHESIOLOGY

## 2022-04-22 RX ORDER — ONDANSETRON 2 MG/ML
4 INJECTION INTRAMUSCULAR; INTRAVENOUS ONCE AS NEEDED
Status: DISCONTINUED | OUTPATIENT
Start: 2022-04-22 | End: 2022-04-22 | Stop reason: HOSPADM

## 2022-04-22 RX ORDER — BALANCED SALT SOLUTION 6.4; .75; .48; .3; 3.9; 1.7 MG/ML; MG/ML; MG/ML; MG/ML; MG/ML; MG/ML
SOLUTION OPHTHALMIC AS NEEDED
Status: DISCONTINUED | OUTPATIENT
Start: 2022-04-22 | End: 2022-04-22 | Stop reason: HOSPADM

## 2022-04-22 RX ORDER — SODIUM CHLORIDE, SODIUM LACTATE, POTASSIUM CHLORIDE, CALCIUM CHLORIDE 600; 310; 30; 20 MG/100ML; MG/100ML; MG/100ML; MG/100ML
125 INJECTION, SOLUTION INTRAVENOUS CONTINUOUS
Status: DISCONTINUED | OUTPATIENT
Start: 2022-04-22 | End: 2022-04-22 | Stop reason: HOSPADM

## 2022-04-22 RX ORDER — LIDOCAINE HYDROCHLORIDE AND EPINEPHRINE 5; 5 MG/ML; UG/ML
INJECTION, SOLUTION INFILTRATION; PERINEURAL AS NEEDED
Status: DISCONTINUED | OUTPATIENT
Start: 2022-04-22 | End: 2022-04-22 | Stop reason: HOSPADM

## 2022-04-22 RX ORDER — FENTANYL CITRATE/PF 50 MCG/ML
50 SYRINGE (ML) INJECTION
Status: DISCONTINUED | OUTPATIENT
Start: 2022-04-22 | End: 2022-04-22 | Stop reason: HOSPADM

## 2022-04-22 RX ORDER — CEFAZOLIN SODIUM 1 G/50ML
1000 SOLUTION INTRAVENOUS ONCE
Status: COMPLETED | OUTPATIENT
Start: 2022-04-22 | End: 2022-04-22

## 2022-04-22 RX ORDER — LIDOCAINE HYDROCHLORIDE 10 MG/ML
0.5 INJECTION, SOLUTION EPIDURAL; INFILTRATION; INTRACAUDAL; PERINEURAL ONCE AS NEEDED
Status: DISCONTINUED | OUTPATIENT
Start: 2022-04-22 | End: 2022-04-22 | Stop reason: HOSPADM

## 2022-04-22 RX ORDER — FENTANYL CITRATE 50 UG/ML
INJECTION, SOLUTION INTRAMUSCULAR; INTRAVENOUS AS NEEDED
Status: DISCONTINUED | OUTPATIENT
Start: 2022-04-22 | End: 2022-04-22

## 2022-04-22 RX ORDER — MAGNESIUM HYDROXIDE 1200 MG/15ML
LIQUID ORAL AS NEEDED
Status: DISCONTINUED | OUTPATIENT
Start: 2022-04-22 | End: 2022-04-22 | Stop reason: HOSPADM

## 2022-04-22 RX ORDER — PROPOFOL 10 MG/ML
INJECTION, EMULSION INTRAVENOUS AS NEEDED
Status: DISCONTINUED | OUTPATIENT
Start: 2022-04-22 | End: 2022-04-22

## 2022-04-22 RX ORDER — ONDANSETRON 2 MG/ML
INJECTION INTRAMUSCULAR; INTRAVENOUS AS NEEDED
Status: DISCONTINUED | OUTPATIENT
Start: 2022-04-22 | End: 2022-04-22

## 2022-04-22 RX ORDER — LIDOCAINE HYDROCHLORIDE 10 MG/ML
INJECTION, SOLUTION EPIDURAL; INFILTRATION; INTRACAUDAL; PERINEURAL AS NEEDED
Status: DISCONTINUED | OUTPATIENT
Start: 2022-04-22 | End: 2022-04-22

## 2022-04-22 RX ORDER — DEXMEDETOMIDINE HYDROCHLORIDE 100 UG/ML
INJECTION, SOLUTION INTRAVENOUS AS NEEDED
Status: DISCONTINUED | OUTPATIENT
Start: 2022-04-22 | End: 2022-04-22

## 2022-04-22 RX ORDER — DEXAMETHASONE SODIUM PHOSPHATE 4 MG/ML
INJECTION, SOLUTION INTRA-ARTICULAR; INTRALESIONAL; INTRAMUSCULAR; INTRAVENOUS; SOFT TISSUE AS NEEDED
Status: DISCONTINUED | OUTPATIENT
Start: 2022-04-22 | End: 2022-04-22

## 2022-04-22 RX ORDER — MIDAZOLAM HYDROCHLORIDE 2 MG/2ML
INJECTION, SOLUTION INTRAMUSCULAR; INTRAVENOUS AS NEEDED
Status: DISCONTINUED | OUTPATIENT
Start: 2022-04-22 | End: 2022-04-22

## 2022-04-22 RX ORDER — NEOMYCIN SULFATE, POLYMYXIN B SULFATE, AND DEXAMETHASONE 3.5; 10000; 1 MG/G; [USP'U]/G; MG/G
OINTMENT OPHTHALMIC AS NEEDED
Status: DISCONTINUED | OUTPATIENT
Start: 2022-04-22 | End: 2022-04-22 | Stop reason: HOSPADM

## 2022-04-22 RX ORDER — HYDROCODONE BITARTRATE AND ACETAMINOPHEN 5; 325 MG/1; MG/1
1 TABLET ORAL EVERY 4 HOURS PRN
Status: DISCONTINUED | OUTPATIENT
Start: 2022-04-22 | End: 2022-04-22 | Stop reason: HOSPADM

## 2022-04-22 RX ORDER — PROPOFOL 10 MG/ML
INJECTION, EMULSION INTRAVENOUS CONTINUOUS PRN
Status: DISCONTINUED | OUTPATIENT
Start: 2022-04-22 | End: 2022-04-22

## 2022-04-22 RX ORDER — KETOROLAC TROMETHAMINE 30 MG/ML
15 INJECTION, SOLUTION INTRAMUSCULAR; INTRAVENOUS ONCE
Status: COMPLETED | OUTPATIENT
Start: 2022-04-22 | End: 2022-04-22

## 2022-04-22 RX ORDER — HYDROCODONE BITARTRATE AND ACETAMINOPHEN 5; 325 MG/1; MG/1
2 TABLET ORAL EVERY 4 HOURS PRN
Status: DISCONTINUED | OUTPATIENT
Start: 2022-04-22 | End: 2022-04-22 | Stop reason: HOSPADM

## 2022-04-22 RX ADMIN — DEXAMETHASONE SODIUM PHOSPHATE 5 MG: 4 INJECTION, SOLUTION INTRAMUSCULAR; INTRAVENOUS at 07:48

## 2022-04-22 RX ADMIN — PROPOFOL 150 MG: 10 INJECTION, EMULSION INTRAVENOUS at 07:33

## 2022-04-22 RX ADMIN — DEXMEDETOMIDINE HYDROCHLORIDE 8 MCG: 100 INJECTION, SOLUTION INTRAVENOUS at 07:46

## 2022-04-22 RX ADMIN — FENTANYL CITRATE 50 MCG: 50 INJECTION, SOLUTION INTRAMUSCULAR; INTRAVENOUS at 09:44

## 2022-04-22 RX ADMIN — PROPOFOL 120 MCG/KG/MIN: 10 INJECTION, EMULSION INTRAVENOUS at 07:36

## 2022-04-22 RX ADMIN — FENTANYL CITRATE 25 MCG: 50 INJECTION, SOLUTION INTRAMUSCULAR; INTRAVENOUS at 07:58

## 2022-04-22 RX ADMIN — MIDAZOLAM 2 MG: 1 INJECTION INTRAMUSCULAR; INTRAVENOUS at 07:24

## 2022-04-22 RX ADMIN — KETOROLAC TROMETHAMINE 15 MG: 30 INJECTION, SOLUTION INTRAMUSCULAR; INTRAVENOUS at 09:38

## 2022-04-22 RX ADMIN — PROPOFOL 50 MG: 10 INJECTION, EMULSION INTRAVENOUS at 07:34

## 2022-04-22 RX ADMIN — ONDANSETRON 4 MG: 2 INJECTION INTRAMUSCULAR; INTRAVENOUS at 07:48

## 2022-04-22 RX ADMIN — SODIUM CHLORIDE, SODIUM LACTATE, POTASSIUM CHLORIDE, AND CALCIUM CHLORIDE 125 ML/HR: .6; .31; .03; .02 INJECTION, SOLUTION INTRAVENOUS at 06:24

## 2022-04-22 RX ADMIN — CEFAZOLIN SODIUM 1000 MG: 1 SOLUTION INTRAVENOUS at 07:23

## 2022-04-22 RX ADMIN — LIDOCAINE HYDROCHLORIDE 50 MG: 10 INJECTION, SOLUTION EPIDURAL; INFILTRATION; INTRACAUDAL; PERINEURAL at 07:33

## 2022-04-22 NOTE — ANESTHESIA POSTPROCEDURE EVALUATION
Post-Op Assessment Note    CV Status:  Stable  Pain Score: 0    Pain management: adequate     Mental Status:  Alert and awake   Hydration Status:  Euvolemic   PONV Controlled:  Controlled   Airway Patency:  Patent      Post Op Vitals Reviewed: Yes      Staff: CRNA, Anesthesiologist         No complications documented      /70 (04/22/22 0923)    Temp 98 9 °F (37 2 °C) (04/22/22 0923)    Pulse 82 (04/22/22 0923)   Resp 16 (04/22/22 0923)    SpO2 95 % (04/22/22 0923)

## 2022-04-22 NOTE — OP NOTE
OPERATIVE REPORT  PATIENT NAME: Amrik Lobato    :  1965  MRN: [id number]  Pt Location:  OR ROOM 11    SURGERY DATE: 2022    Surgeon(s) and Role:     * Dang Bass MD - Primary     * Rosales Gavin - Assisting    Preop Diagnosis:  Blepharochalasis right upper eyelid [H02 31]  Blepharochalasis right lower eyelid [H02 32]  Blepharochalasis left upper eyelid [H02 34]  Blepharochalasis left lower eyelid [H02 35]    Post-Op Diagnosis Codes: * Blepharochalasis right upper eyelid [H02 31]     * Blepharochalasis right lower eyelid [H02 32]     * Blepharochalasis left upper eyelid [H02 34]     * Blepharochalasis left lower eyelid [H02 35]    Procedure(s) (LRB):  BLEPHAROPLASTY UPPER (Bilateral)  BLEPHAROPLASTY LOWER (Bilateral)    Specimen(s):  * No specimens in log *    Estimated Blood Loss:   Minimal    Drains:  * No LDAs found *    Anesthesia Type:   IV Sedation with Anesthesia    Operative Indications:  Blepharochalasis right upper eyelid [H02 31]  Blepharochalasis right lower eyelid [H02 32]  Blepharochalasis left upper eyelid [H02 34]  Blepharochalasis left lower eyelid [H02 35]       Operative Findings:  Excess fat    Complications:   None    Procedure and Technique:  The patient was properly identified in the operating room and placed    in the supine position on the bed  She was prepped and draped in the    sterile surgical fashion  We first started by marking out the eyes with gentian violet and then    went ahead and incised the injected skin with 1% lidocaine with    epinephrine  At this point, we then went ahead and excised the skin    with a #15 blade and electrocautery  We obtained meticulous    hemostasis  We removed a strip of orbicularis oculi muscle along the    tarsal edge  We then went ahead and removed  fat from    the middle and medial fat pads of the upper eyes bilaterally   At this    point, we obtained meticulous hemostasis and closed the upper eyes    with deep 6-0 Vicryl and 6-0 Prolene suture  We then turned our attention to the lower eyes  The lower eyes were    injected with 1% lidocaine with epinephrine  We made a skin only    incision elevating the skin inferiorly for approximately 8 mm  Once    this was done, we then dived down below the orbicularis ocular muscle    just above the orbital septum  We dissected all the way down to the    infraorbital rim  At this point, we opened up the orbital septum along    the inferior leading edge  The fat was then identified and then    sutured down onto the infraorbital rim with 6-0 Vicryl suture  After    this was done, we obtained meticulous hemostasis  We redraped the    eyelid skin  We removed a 1-cm pinch excision of lower eyelid skin  The skin was then sutured to together with 6-0 chromic sutures  The    patient tolerated procedure well and was awakened from surgery,    dressed with antibiotic ointment and taken to the recovery room in    stable condition         I was present for the entire procedure    Patient Disposition:  PACU       SIGNATURE: Jyothi Hernandez MD  DATE: April 22, 2022  TIME: 9:23 AM

## 2022-04-22 NOTE — ANESTHESIA PREPROCEDURE EVALUATION
Procedure:  BLEPHAROPLASTY UPPER (Bilateral Eye)  BLEPHAROPLASTY LOWER (Bilateral Eye)    Relevant Problems   ANESTHESIA   (+) PONV (postoperative nausea and vomiting)      CARDIO   (+) Varicose veins of right lower extremity with pain      GYN   (+) Malignant neoplasm of upper-outer quadrant of left breast in female, estrogen receptor positive (HCC)      MUSCULOSKELETAL   (+) Coccyx pain      NEURO/PSYCH   (+) Anxiety   (+) History of left breast cancer      Other   (+) Abnormal uterine bleeding (AUB)   (+) Heartburn   (+) Hx of radiation therapy (Resolved)   (+) Menopause   (+) Use of tamoxifen (Nolvadex)      Results for Svitlana Tobias (MRN [id number]) as of 4/22/2022 06:46   Ref  Range 4/22/2022 06:12   PREGNANCY TEST URINE Latest Ref Range: Negative  Negative     Physical Exam    Airway    Mallampati score: II  TM Distance: >3 FB  Neck ROM: full     Dental   No notable dental hx     Cardiovascular      Pulmonary      Other Findings        Anesthesia Plan  ASA Score- 2     Anesthesia Type- general with ASA Monitors  Additional Monitors:   Airway Plan: LMA  Plan Factors-Exercise tolerance (METS): >4 METS  Chart reviewed  Existing labs reviewed  Patient summary reviewed  Patient is not a current smoker  Patient did not smoke on day of surgery  Induction- intravenous  Postoperative Plan- Plan for postoperative opioid use  Informed Consent- Anesthetic plan and risks discussed with patient  I personally reviewed this patient with the CRNA  Discussed and agreed on the Anesthesia Plan with the CRNA  Aileen Hunt

## 2022-04-22 NOTE — DISCHARGE INSTRUCTIONS
[facility]  [address] [facility], [address], [address], [place], [address] [facility]   [phone] / [phone] / www [facility]  [facility]  Home Instructions for the Blepharoplasty Patient      Please call the office today to schedule a post operative appointment, and tell the office staff  that you doctor needs see you in our office in 7-10 days  1  Keep ice on for the first 5 days (20minutes on, 20 minutes off) as much as possible  A bag of frozen vegetables works great  2  Apply a thin layer of the ophthalmic  ointment that you have been prescribed to the suture line three times  a day  3  Keep your head elevated on 2 - 3 pillows, especially when sleeping for  the first three days  Patients find sleeping  in a recliner or using 2 - 3 pillows will help decreased bruising and swelling  4  Do not sleep on your sides until the sutures are removed  5  You will be given a prescription for pain medicine  You can use extra strength Tylenol, Advil, or Aleve as substitute  Follow directions on the bottle  Do not take any aspirin or medication containing aspirin for two weeks following surgery  6  Cosmetics may be applied after your sutures are removed, unless otherwise instructed  7  No excessive sun exposure for 6 weeks  following your surgery, the use of protective sunscreen lotion thereafter at all times will be necessary  8  You may attempt to wear your contact lenses after instructed by your doctor, however if they are not comfortable, remove them at once and wait one or two days before trying again  9  Do not bend, lift or train until instructed  10  Do not smoke  11  Swelling and discoloration is expected, as is uneven swelling (more on one side than the other)  Mild visual blurring for a week or two post surgery is not uncommon   If your eyes become uncomfortable, such as a burning or scratching pain, please report this immediately  12  Do not drive until instructed to do so, usually one week postoperatively  13  You may shower 24 hours after surgery unless otherwise instructed  14  If you have any questions, tere call the office at [phone]

## 2022-04-22 NOTE — INTERVAL H&P NOTE
H&P reviewed  After examining the patient I find no changes in the patients condition since the H&P had been written      Vitals:    04/22/22 0616   BP: 120/67   Pulse: 74   Resp: 20   Temp: (!) 97 2 °F (36 2 °C)   SpO2: 98%

## 2022-04-22 NOTE — DISCHARGE SUMMARY
PLASTIC, RECONSTRUCTIVE, & HAND SURGERY   Discharge Summary  Date of Admission:   4/22/2022  Date of Discharge:   04/22/22  Attending:  Chelsey Ramey MD  Principal/Final Diagnosis:   Blepharochalasis right upper eyelid [H02 31]  Blepharochalasis right lower eyelid [H02 32]  Blepharochalasis left upper eyelid [H02 34]  Blepharochalasis left lower eyelid [H02 35]  Principal Procedure:   BLEPHAROPLASTY UPPER (Bilateral Eye)  BLEPHAROPLASTY LOWER (Bilateral Eye)  Discharge Medications:  See after visit summary for reconciled discharge medications provided to patient and family  Reason for Admission:  Myrna Mckeon was electively admitted to undergo the above named procedure on 4/22/2022 as an outpatient  Hospital Course:  Patient underwent the above named procedure on the day of admission without complications  They were discharged home the same day  Disposition:  To home in care of self and family    Condition:  Good  Follow up:  Patient with follow up in the office with Dr Chelsey Ramey MD in 1 week(s) or as scheduled per his office  Chelsey Ramey MD  4/22/2022 9:22 AM

## 2022-05-09 ENCOUNTER — TELEPHONE (OUTPATIENT)
Dept: HEMATOLOGY ONCOLOGY | Facility: CLINIC | Age: 57
End: 2022-05-09

## 2022-05-09 NOTE — TELEPHONE ENCOUNTER
Appointment Cancellation Or Reschedule     Person calling in patient   Provider Dr Betsey Kimball   Office Visit Date and Time  5/16 2 920am   Office Visit Location [place]   Did patient want to reschedule their office appointment? If so, when was it scheduled to? Yes 5/12 2 8am   Is this patient calling to reschedule an infusion appointment? no   When is their next infusion appointment? n/a   Is this patient a Chemo patient? no   Reason for Cancellation or Reschedule Schedule conflict with another appt     If the patient is a treatment patient, please route this to the office nurse  If the patient is not on treatment, please route to the office MA

## 2022-05-12 ENCOUNTER — OFFICE VISIT (OUTPATIENT)
Dept: HEMATOLOGY ONCOLOGY | Facility: CLINIC | Age: 57
End: 2022-05-12
Payer: COMMERCIAL

## 2022-05-12 VITALS
RESPIRATION RATE: 16 BRPM | HEART RATE: 70 BPM | HEIGHT: 66 IN | TEMPERATURE: 97.9 F | SYSTOLIC BLOOD PRESSURE: 120 MMHG | DIASTOLIC BLOOD PRESSURE: 70 MMHG | WEIGHT: 117 LBS | BODY MASS INDEX: 18.8 KG/M2 | OXYGEN SATURATION: 96 %

## 2022-05-12 DIAGNOSIS — Z17.0 MALIGNANT NEOPLASM OF UPPER-OUTER QUADRANT OF LEFT BREAST IN FEMALE, ESTROGEN RECEPTOR POSITIVE (HCC): Primary | ICD-10-CM

## 2022-05-12 DIAGNOSIS — C50.412 MALIGNANT NEOPLASM OF UPPER-OUTER QUADRANT OF LEFT BREAST IN FEMALE, ESTROGEN RECEPTOR POSITIVE (HCC): Primary | ICD-10-CM

## 2022-05-12 DIAGNOSIS — Z78.0 MENOPAUSE: ICD-10-CM

## 2022-05-12 PROCEDURE — 99214 OFFICE O/P EST MOD 30 MIN: CPT | Performed by: INTERNAL MEDICINE

## 2022-05-12 RX ORDER — HYDROCODONE BITARTRATE AND ACETAMINOPHEN 5; 325 MG/1; MG/1
TABLET ORAL
COMMUNITY
Start: 2022-04-25 | End: 2022-07-20 | Stop reason: ALTCHOICE

## 2022-05-12 NOTE — PROGRESS NOTES
HPI:   Follow-up visit for breast cancer and for that in January 2017 patient had had left breast lumpectomy and sentinel lymph node sampling  Tumor characteristics were  hormone receptor positive, HER-2 negative, G1, T1c, 1 4 cm, stage I invasive mammary carcinoma in the left breast Oncotype score was 10  BRCA test was negative  She received radiation  In February 2017 she was started on tamoxifen  Patient was premenopausal at that time  Her last menstrual period was few months ago and now she could be still pre or perimenopausal  She follows with her gynecologist had had examination and Pap smear in February 2022  We had talked about ovarian suppression or BSO because results are better when combined with tamoxifen or AI  She wanted to stay on tamoxifen and go into menopause  naturally   [facility] Patient stopped taking tamoxifen in February or March 2022 after taking tamoxifen for 5 years  She does not want to continue on tamoxifen and she does not want to be made postmenopausal and to have AI  She did not like tamoxifen but stayed on it for 5 years  She states tamoxifen was affecting her quality of life and she was having mood swings  She feels much better now not being on tamoxifen    She has no symptoms at present and even anxiety is much less                  Current Outpatient Medications:     ALPRAZolam (XANAX) 0 25 mg tablet, Take 0 25 mg by mouth daily as needed for anxiety, Disp: , Rfl:     azelastine (ASTELIN) 0 1 % nasal spray, INSTILL 2 SPRAYS INTO EACH NOSTRIL 2TIMES DAILY AS NEEDED FOR RHINITIS/ALLERGIES, Disp: 1 Bottle, Rfl: 1    Calcium 500 MG tablet, Take 1 tablet by mouth daily, Disp: , Rfl:     COLLAGEN PO, Take 1 tablet by mouth daily, Disp: , Rfl:     fexofenadine (ALLEGRA) 180 MG tablet, Take 1 tablet (180 mg total) by mouth daily, Disp: 90 tablet, Rfl: 2    fluticasone (FLONASE) 50 mcg/act nasal spray, 2 sprays into each nostril daily for 365 doses, Disp: 16 g, Rfl: 11    Ibuprofen 200 MG CAPS, Take by mouth as needed, Disp: , Rfl:     MILK THISTLE PO, Take 1 tablet by mouth daily, Disp: , Rfl:     Multiple Vitamins-Minerals (MULTIVITAMIN ADULT PO), Take 1 tablet by mouth daily, Disp: , Rfl:     olopatadine HCl (PATADAY) 0 2 % opth drops, , Disp: , Rfl:     Probiotic Product (PROBIOTIC-10 PO), Take by mouth in the morning  , Disp: , Rfl:     HYDROcodone-acetaminophen (NORCO) 5-325 mg per tablet, 1 TABLET BY MOUTH EVERY FOUR HOURS AS NEEDED FOR PAIN TAKE AFTER YOUR PROCEDURE , Disp: , Rfl:     No Known Allergies    Oncology History   Malignant neoplasm of upper-outer quadrant of left breast in female, estrogen receptor positive ([facility] [address] )   11/2016 Initial Diagnosis    Malignant neoplasm of upper-outer quadrant of left breast in female, estrogen receptor positive ([facility] [address] )     11/17/2016 Biopsy    Left breast biopsy: invasive and in situ ductal carcinoma      1/3/2017 Surgery    Left lumpectomy and sentinel node biopsy  Final staging: Stage IA IDC, strongly ER/IL +, HER2-     2017 Genetic Testing    Oncotype score was 10  BRCA test was negative  2/2017 -  Hormone Therapy    Tamoxifen      3/13/2017 - 4/24/2017 Radiation    left breast to a dose of 5000 cGy followed by an additional 1000 cGy to the primary site         ROS:  05/12/22 Reviewed 12 systems: See symptoms in HPI:   Presently no  neurological, cardiac, pulmonary, GI and  symptoms  Other Symptoms are in HPI  No fever, chills, bleeding, bone pains, skin rash, weight loss, arthritic symptoms, tiredness ,  weakness, numbness, hot flashes  claudication and gait problem  No frequent infections  Not unusually sensitive to heat or cold  No swelling of the ankles  No swollen glands  Patient is much less anxious         /70 (BP Location: Left arm, Patient Position: Sitting, Cuff Size: Standard)   Pulse 70   Temp 97 9 °F (36 6 °C) (Temporal)   Resp 16   Ht 5' 6" (1 676 m)   Wt 53 1 kg (117 lb)   SpO2 96%   BMI 18 88 kg/m² Physical Exam[de-identified]   Medical assistant Yeny Garcia  was in the room with me during examination    Alert and oriented and not in distress  Vital signs are stable  There is no icterus,   no oral thrush, no palpable neck mass, clear lung fields, regular heart rate, abdomen  soft and non tender, no palpable abdominal mass, no ascites, no edema of ankles, no calf tenderness, no focal neurological deficit, no skin rash, no palpable lymphadenopathy in the neck and axillary areas,  no clubbing  Patient is much less anxious  Performance status 0  IMAGING:  ASSESSMENT/BI-RADS CATEGORY:  Left: 2 - Benign  Right: 1 - Negative  Overall: 2 - Benign     RECOMMENDATION:       - Diagnostic mammogram at the current time for both breasts      Workstation ID: [id number]   Imaging    MRI breast bilateral w and wo contrast w cad (Order: [id number]) - 7/31/2021        LABS:    Results for orders placed or performed during the hospital encounter of 04/22/22   Urine Pregnancy (Holding Area Only) POCT   Result Value Ref Range    EXT Preg Test, Ur Negative Negative    Control Valid Valid     Labs, Imaging, & Other studies:   All pertinent labs and imaging studies were personally reviewed    Lab Results   Component Value Date    K 4 2 04/13/2022     04/13/2022    CO2 31 04/13/2022    BUN 12 04/13/2022    CREATININE 0 72 04/13/2022    GLUF 90 04/13/2022    CALCIUM 10 2 (H) 04/13/2022    AST 21 04/13/2022    ALT 25 04/13/2022    ALKPHOS 60 04/13/2022    EGFR 95 04/13/2022     Lab Results   Component Value Date    WBC 5 05 04/13/2022    HGB 13 4 04/13/2022    HCT 40 8 04/13/2022    MCV 91 04/13/2022     04/13/2022     Normal differential   Normal CA 27-29  Reviewed test results and discussed with patient  Assessment and plan: Aisha Hong Follow-up visit for breast cancer and for that in   January 2017 patient had had left breast lumpectomy and sentinel lymph node sampling    Tumor characteristics were  hormone receptor positive, HER-2 negative, G1, T1c, 1 4 cm, stage I invasive mammary carcinoma in the left breast Oncotype score was 10  BRCA test was negative  She received radiation  In February 2017 she was started on tamoxifen  Patient was premenopausal at that time  Her last menstrual period was few months ago and now she could be still pre or perimenopausal  She follows with her gynecologist had had examination and Pap smear in February 2022  We had talked about ovarian suppression or BSO because results are better when combined with tamoxifen or AI  She wanted to stay on tamoxifen and go into menopause  naturally   [place] Patient stopped taking tamoxifen in February or March 2022 after taking tamoxifen for 5 years  She does not want to continue on tamoxifen and she does not want to be made postmenopausal and to have AI  She did not like tamoxifen but stayed on it for 5 years  She states tamoxifen was affecting her quality of life and she was having mood swings  She feels much better now not being on tamoxifen  She has no symptoms at present and even anxiety is much less   Physical examination and test results are as recorded and discussed She remains in remission from breast cancer  Memory Dago is surveillance as above   She goes to her breast surgeon for examination of breasts and imaging studies     She goes to her gynecologist   She states she is up-to-date with her medical checkups     Discussed the importance of self-breast examination, eating healthy foods, staying active and health screening tests  Patient is capable of self-care  Discussed precautions against coronavirus     She will continue to follow with her primary physician and other consultants  See diagnoses, orders instructions    1  Malignant neoplasm of upper-outer quadrant of left breast in female, estrogen receptor positive ([facility] [address] )    - CBC and differential; Future  - Comprehensive metabolic panel; Future  - Cancer antigen 27 29; Future    2   Menopause    - Follicle stimulating hormone; Future    Blood work prior to next visit in 6 months    Patient voiced understanding and agreed      Counseling / Coordination of Care      Provided counseling and support

## 2022-07-14 ENCOUNTER — HOSPITAL ENCOUNTER (OUTPATIENT)
Dept: RADIOLOGY | Facility: HOSPITAL | Age: 57
Discharge: HOME | End: 2022-07-14
Attending: SURGERY
Payer: COMMERCIAL

## 2022-07-14 DIAGNOSIS — C50.412 MALIGNANT NEOPLASM OF UPPER-OUTER QUADRANT OF LEFT BREAST IN FEMALE, ESTROGEN RECEPTOR POSITIVE (HCC): ICD-10-CM

## 2022-07-14 DIAGNOSIS — Z17.0 MALIGNANT NEOPLASM OF UPPER-OUTER QUADRANT OF LEFT BREAST IN FEMALE, ESTROGEN RECEPTOR POSITIVE (HCC): ICD-10-CM

## 2022-07-14 DIAGNOSIS — R92.2 DENSE BREAST TISSUE: ICD-10-CM

## 2022-07-14 PROCEDURE — A9585 GADOBUTROL INJECTION: HCPCS | Performed by: SURGERY

## 2022-07-14 PROCEDURE — C8908 MRI W/O FOL W/CONT, BREAST,: HCPCS

## 2022-07-14 PROCEDURE — G1004 CDSM NDSC: HCPCS

## 2022-07-14 RX ADMIN — GADOBUTROL 5 ML: 604.72 INJECTION INTRAVENOUS at 17:48

## 2022-07-20 ENCOUNTER — OFFICE VISIT (OUTPATIENT)
Dept: SURGICAL ONCOLOGY | Facility: CLINIC | Age: 57
End: 2022-07-20
Payer: COMMERCIAL

## 2022-07-20 VITALS
WEIGHT: 119.8 LBS | SYSTOLIC BLOOD PRESSURE: 100 MMHG | TEMPERATURE: 97.4 F | HEIGHT: 66 IN | BODY MASS INDEX: 19.25 KG/M2 | DIASTOLIC BLOOD PRESSURE: 70 MMHG

## 2022-07-20 DIAGNOSIS — Z85.3 PERSONAL HISTORY OF ADENOCARCINOMA OF BREAST: ICD-10-CM

## 2022-07-20 DIAGNOSIS — Z85.3 ENCOUNTER FOR FOLLOW-UP SURVEILLANCE OF BREAST CANCER: Primary | ICD-10-CM

## 2022-07-20 DIAGNOSIS — R92.2 DENSE BREAST TISSUE ON MAMMOGRAM: ICD-10-CM

## 2022-07-20 DIAGNOSIS — Z08 ENCOUNTER FOR FOLLOW-UP SURVEILLANCE OF BREAST CANCER: Primary | ICD-10-CM

## 2022-07-20 PROCEDURE — 99213 OFFICE O/P EST LOW 20 MIN: CPT | Performed by: SURGERY

## 2022-07-20 NOTE — PROGRESS NOTES
Surgical Oncology Follow Up       [facility] SURGICAL ONCOLOGY SHANNON Esteban  [facility] [address]    Eddy Councilman  1965  [id number]  [facility] SURGICAL ONCOLOGY [place]  [place] [address]    No chief complaint on file  Assessment/Plan   Diagnoses and all orders for this visit:    Encounter for follow-up surveillance of breast cancer    Personal history of adenocarcinoma of breast  -     Mammo diagnostic bilateral w 3d & cad; Future    Dense breast tissue on mammogram        Advance Care Planning/Advance Directives:  Discussed disease status, cancer treatment plans and/or cancer treatment goals with the patient  Oncology History:    Oncology History   Personal history of adenocarcinoma of breast   11/2016 Initial Diagnosis    Malignant neoplasm of upper-outer quadrant of left breast in female, estrogen receptor positive ([facility] [address] )     11/17/2016 Biopsy    Left breast biopsy: invasive and in situ ductal carcinoma      1/3/2017 Surgery    Left lumpectomy and sentinel node biopsy  Final staging: Stage IA IDC, strongly ER/MD +, HER2-     2017 Genetic Testing    Oncotype score was 10  BRCA test was negative  2/2017 -  Hormone Therapy    Tamoxifen      3/13/2017 - 4/24/2017 Radiation    left breast to a dose of 5000 cGy followed by an additional 1000 cGy to the primary site         History of Present Illness:  Breast cancer follow-up, no breast referable concerns, completed her tamoxifen  -Interval History:  Recent MRI    Review of Systems:  Review of Systems   Constitutional: Negative  Negative for appetite change and fever  Eyes: Negative  Respiratory: Negative for shortness of breath  Cardiovascular: Negative  Gastrointestinal: Negative  Endocrine: Negative  Genitourinary: Negative  Musculoskeletal: Negative  Negative for arthralgias and myalgias  Skin: Negative  Allergic/Immunologic: Negative  Neurological: Negative  Hematological: Negative  Negative for adenopathy  Does not bruise/bleed easily  Psychiatric/Behavioral: Negative          Patient Active Problem List   Diagnosis    Personal history of adenocarcinoma of breast    Right lower quadrant pain    Rectal pain    External hemorrhoid    Narrowing of stools    Vaginal discharge    Encntr for gyn exam (general) (routine) w abnormal findings    Vaginal relaxation    Routine screening for STI (sexually transmitted infection)    Dense breast tissue    Varicose veins of right lower extremity with pain    Coccyx pain    Heartburn    Pelvic floor relaxation    Abdominal bloating    Abnormal uterine bleeding (AUB)    Menopause    Chronic seasonal allergic rhinitis due to pollen    Allergic rhinitis due to dogs    Encounter for annual routine gynecological examination    First degree uterine prolapse    Rectocele    Constipation    Cystocele, midline    Submucous leiomyoma of uterus    Family history of breast cancer in sister   Boyd Mike Screening for cervical cancer    Dense breast tissue on mammogram    Dermatochalasis of both eyelids    PONV (postoperative nausea and vomiting)    Anxiety    Encounter for follow-up surveillance of breast cancer     Past Medical History:   Diagnosis Date    Abdominal pain, left lower quadrant     Anxiety     BRCA negative     Common cold     Environmental and seasonal allergies     Exercises 5 to 6 times per week     Food allergy     scallops    Low back pain     PONV (postoperative nausea and vomiting)     Seasonal allergies     Use of tamoxifen (Nolvadex)     Vaginal discharge     Vaginal irritation     Vulvar burning     Vulvar irritation      Past Surgical History:   Procedure Laterality Date    APPENDECTOMY      BREAST BIOPSY Left 11/17/2016    U/S BX    COLONOSCOPY      MAMMO NEEDLE LOCALIZATION LEFT (ALL INC) Left 1/3/2017    UT BIOPSY/EXCISION, LYMPH NODE(S) Left 1/3/2017    Procedure: BIOPSY LYMPH NODE SENTINEL @ [address];  Surgeon: Rhea Peace MD;  [facility];  Service: Surgical Oncology    NE MASTECTOMY, PARTIAL Left 1/3/2017    Procedure: LUMPECTOMY BREAST NEEDLE LOCALIZED @ 0930;  Surgeon: Rhea Peace MD;  [facility];  Service: Surgical Oncology    NE REV UPPER EYELID W EXCESS SKIN Bilateral 2022    Procedure: BLEPHAROPLASTY UPPER;  Surgeon: Ebenezer Curran MD;  Location: [address] [place] [address] MAIN OR;  Service: Plastics    NE REVISION OF LOWER EYELID Bilateral 2022    Procedure: BLEPHAROPLASTY LOWER;  Surgeon: Ebenezer Curran MD;  Location: [address] [place] [address] OR;  Service: Plastics    SENTINEL LYMPH NODE BIOPSY      US GUIDED BREAST BIOPSY LEFT COMPLETE Left 2016     Family History   Problem Relation Age of Onset    Lung cancer Mother         mother age 61    Lung cancer Father         father age 66    Breast cancer Sister         age dx unk    No Known Problems Brother     No Known Problems Maternal Grandmother     No Known Problems Maternal Grandfather     No Known Problems Paternal Grandmother     No Known Problems Paternal Grandfather     No Known Problems Daughter     No Known Problems Daughter     No Known Problems Maternal Aunt     No Known Problems Maternal Aunt     Breast cancer Paternal Aunt         aunt 50    Other Half-Brother         Born no spleen -  if blood infection    Colon cancer Neg Hx          Current Outpatient Medications:     ALPRAZolam (XANAX) 0 25 mg tablet, Take 0 25 mg by mouth daily as needed for anxiety, Disp: , Rfl:     azelastine (ASTELIN) 0 1 % nasal spray, INSTILL 2 SPRAYS INTO EACH NOSTRIL 2TIMES DAILY AS NEEDED FOR RHINITIS/ALLERGIES, Disp: 1 Bottle, Rfl: 1    Calcium 500 MG tablet, Take 1 tablet by mouth daily, Disp: , Rfl:     COLLAGEN PO, Take 1 tablet by mouth daily, Disp: , Rfl:     fexofenadine (ALLEGRA) 180 MG tablet, Take 1 tablet (180 mg total) by mouth daily, Disp: 90 tablet, Rfl: 2    fluticasone (FLONASE) 50 mcg/act nasal spray, 2 sprays into each nostril daily for 365 doses, Disp: 16 g, Rfl: 11    Ibuprofen 200 MG CAPS, Take by mouth as needed, Disp: , Rfl:     MILK THISTLE PO, Take 1 tablet by mouth daily, Disp: , Rfl:     Multiple Vitamins-Minerals (MULTIVITAMIN ADULT PO), Take 1 tablet by mouth daily, Disp: , Rfl:     olopatadine HCl (PATADAY) 0 2 % opth drops, , Disp: , Rfl:     Probiotic Product (PROBIOTIC-10 PO), Take by mouth in the morning  , Disp: , Rfl:   No Known Allergies    The following portions of the patient's history were reviewed and updated as appropriate: allergies, current medications, past family history, past medical history, past social history, past surgical history and problem list         Vitals:    07/20/22 0914   BP: 100/70   Temp: (!) 97 4 °F (36 3 °C)       Physical Exam  Constitutional:       General: She is not in acute distress  Appearance: Normal appearance  She is well-developed  HENT:      Head: Normocephalic and atraumatic  Cardiovascular:      Heart sounds: Normal heart sounds  Pulmonary:      Breath sounds: Normal breath sounds  Chest:   Breasts:      Right: No inverted nipple, mass, nipple discharge, skin change, tenderness, axillary adenopathy or supraclavicular adenopathy  Left: Skin change (Lumpectomy scar) present  No inverted nipple, mass, nipple discharge, tenderness, axillary adenopathy or supraclavicular adenopathy  Abdominal:      Palpations: Abdomen is soft  Lymphadenopathy:      Upper Body:      Right upper body: No supraclavicular, axillary or pectoral adenopathy  Left upper body: No supraclavicular, axillary or pectoral adenopathy  Neurological:      Mental Status: She is alert and oriented to person, place, and time     Psychiatric:         Mood and Affect: Mood normal            Results:  Labs:      Imaging  07/14/2022 bilateral breast MRI was benign    I reviewed the above imaging data  Discussion/Summary:  68-year-old female status post left breast conservation for a stage I invasive ductal carcinoma  She had radiation therapy and recently completed her tamoxifen  She continues to have extremely dense breast tissue  There are no concerns on exam today  They recent MRI was benign  I will make arrangements for her final diagnostic mammogram for the beginning of the year so the MRI and mammogram are staggered  I will see her again at that time for another visit  If there are no concerns, we will likely start annual exams versus survivorship clinic

## 2022-10-12 PROBLEM — Z12.4 SCREENING FOR CERVICAL CANCER: Status: RESOLVED | Noted: 2022-02-11 | Resolved: 2022-10-12

## 2022-10-21 ENCOUNTER — TELEPHONE (OUTPATIENT)
Dept: HEMATOLOGY ONCOLOGY | Facility: CLINIC | Age: 57
End: 2022-10-21

## 2022-10-21 NOTE — TELEPHONE ENCOUNTER
Left message informing patient that her FU appt with Dr Alcantara Heading was rescheduled from 11/14/22 at [address] [place] [address] Avenue to 12/16/22 at 2:20pm  Provided office number for patient to call if she needs to reschedule the appt

## 2022-10-26 ENCOUNTER — TELEPHONE (OUTPATIENT)
Dept: HEMATOLOGY ONCOLOGY | Facility: CLINIC | Age: 57
End: 2022-10-26

## 2022-10-26 NOTE — TELEPHONE ENCOUNTER
Appointment Cancellation Or Reschedule     Person calling in Patient    If other than patient calling, are they listed on the communication consent form? Provider Dr Aliza Merino   Office Visit Date and Time  12/16/22 2:20 pm    Office Visit Location [place]   Did patient want to reschedule their office appointment? If so, when was it scheduled to? Yes  1/30/22 8:20 am    Did you have STAR scheduled for this appointment? n/a   Do you need STAR set up for your new appointment? If yes, please send to "PATIENT RIDESHARE" pool for STAR rescheduling n/a   If you are cancelling appointment, can we notify STAR to cancel ride? If yes, please send to "PATIENT RIDESHARE" pool for STAR to cancel service n/a   Is this patient calling to reschedule an infusion appointment? no   When is their next infusion appointment? n/a   Is this patient a Chemo patient? no   Reason for Cancellation or Reschedule Patient was left a message regarding her  rescheduled of her appointment  Patient stating time and date padilla snit work for her, request Monday morning only  If the patient is a treatment patient, please route this to the office nurse  If the patient is not on treatment, please route to the office MA  If the patient is a surgical oncology patient, please route to surg/onc clinical pool

## 2023-01-04 ENCOUNTER — HOSPITAL ENCOUNTER (OUTPATIENT)
Dept: MAMMOGRAPHY | Facility: CLINIC | Age: 58
Discharge: HOME | End: 2023-01-04

## 2023-01-04 VITALS — BODY MASS INDEX: 19.44 KG/M2 | WEIGHT: 121 LBS | HEIGHT: 66 IN

## 2023-01-04 DIAGNOSIS — Z85.3 PERSONAL HISTORY OF ADENOCARCINOMA OF BREAST: ICD-10-CM

## 2023-01-16 ENCOUNTER — OFFICE VISIT (OUTPATIENT)
Dept: SURGICAL ONCOLOGY | Facility: CLINIC | Age: 58
End: 2023-01-16

## 2023-01-16 VITALS
HEART RATE: 74 BPM | WEIGHT: 126 LBS | OXYGEN SATURATION: 98 % | BODY MASS INDEX: 20.25 KG/M2 | DIASTOLIC BLOOD PRESSURE: 74 MMHG | RESPIRATION RATE: 16 BRPM | TEMPERATURE: 98.1 F | SYSTOLIC BLOOD PRESSURE: 122 MMHG | HEIGHT: 66 IN

## 2023-01-16 DIAGNOSIS — R92.2 DENSE BREAST TISSUE: ICD-10-CM

## 2023-01-16 DIAGNOSIS — Z12.31 SCREENING MAMMOGRAM FOR BREAST CANCER: ICD-10-CM

## 2023-01-16 DIAGNOSIS — Z08 ENCOUNTER FOR FOLLOW-UP SURVEILLANCE OF BREAST CANCER: Primary | ICD-10-CM

## 2023-01-16 DIAGNOSIS — Z85.3 PERSONAL HISTORY OF ADENOCARCINOMA OF BREAST: ICD-10-CM

## 2023-01-16 DIAGNOSIS — Z85.3 ENCOUNTER FOR FOLLOW-UP SURVEILLANCE OF BREAST CANCER: Primary | ICD-10-CM

## 2023-01-16 NOTE — PROGRESS NOTES
Surgical Oncology Follow Up       [facility] SURGICAL ONCOLOGY [facility] [address]    Aaron Rothman  1965  [id number]  [facility] SURGICAL ONCOLOGY [place]  [place] [address]    Chief Complaint   Patient presents with   • Follow-up       Assessment/Plan   Diagnoses and all orders for this visit:    Encounter for follow-up surveillance of breast cancer    Personal history of adenocarcinoma of breast  -     MRI breast bilateral w and wo contrast w cad; Future    Dense breast tissue  -     MRI breast bilateral w and wo contrast w cad; Future    Screening mammogram for breast cancer  -     Mammo screening bilateral w 3d & cad; Future        Advance Care Planning/Advance Directives:  Discussed disease status, cancer treatment plans and/or cancer treatment goals with the patient  Oncology History:    Oncology History   Personal history of adenocarcinoma of breast   11/2016 Initial Diagnosis    Malignant neoplasm of upper-outer quadrant of left breast in female, estrogen receptor positive ([facility] [address] )     11/17/2016 Biopsy    Left breast biopsy: invasive and in situ ductal carcinoma      1/3/2017 Surgery    Left lumpectomy and sentinel node biopsy  Final staging: Stage IA IDC, strongly ER/NE +, HER2-     2017 Genetic Testing    Oncotype score was 10  BRCA test was negative  2/2017 -  Hormone Therapy    Tamoxifen      3/13/2017 - 4/24/2017 Radiation    left breast to a dose of 5000 cGy followed by an additional 1000 cGy to the primary site         History of Present Illness: Cancer follow-up, no breast referable concerns  -Interval History: Benign imaging    Review of Systems:  Review of Systems   Constitutional: Negative  Negative for appetite change and fever  Eyes: Negative  Respiratory: Negative for shortness of breath  Cardiovascular: Negative  Gastrointestinal: Negative      Endocrine: Negative  Genitourinary: Negative  Musculoskeletal: Negative  Negative for arthralgias and myalgias  Skin: Negative  Allergic/Immunologic: Negative  Neurological: Negative  Hematological: Negative  Negative for adenopathy  Does not bruise/bleed easily  Psychiatric/Behavioral: Negative          Patient Active Problem List   Diagnosis   • Personal history of adenocarcinoma of breast   • Right lower quadrant pain   • Rectal pain   • External hemorrhoid   • Narrowing of stools   • Vaginal discharge   • Encntr for gyn exam (general) (routine) w abnormal findings   • Vaginal relaxation   • Routine screening for STI (sexually transmitted infection)   • Dense breast tissue   • Varicose veins of right lower extremity with pain   • Coccyx pain   • Heartburn   • Pelvic floor relaxation   • Abdominal bloating   • Abnormal uterine bleeding (AUB)   • Menopause   • Chronic seasonal allergic rhinitis due to pollen   • Allergic rhinitis due to dogs   • Encounter for annual routine gynecological examination   • First degree uterine prolapse   • Rectocele   • Constipation   • Cystocele, midline   • Submucous leiomyoma of uterus   • Family history of breast cancer in sister   • Dermatochalasis of both eyelids   • PONV (postoperative nausea and vomiting)   • Anxiety   • Encounter for follow-up surveillance of breast cancer   • Screening mammogram for breast cancer     Past Medical History:   Diagnosis Date   • Abdominal pain, left lower quadrant    • Anxiety    • BRCA negative    • Common cold    • Environmental and seasonal allergies    • Exercises 5 to 6 times per week    • Food allergy     scallops   • Low back pain    • PONV (postoperative nausea and vomiting)    • Seasonal allergies    • Use of tamoxifen (Nolvadex)    • Vaginal discharge    • Vaginal irritation    • Vulvar burning    • Vulvar irritation      Past Surgical History:   Procedure Laterality Date   • APPENDECTOMY     • BREAST BIOPSY Left 11/17/2016 U/S BX   • COLONOSCOPY     • MAMMO NEEDLE LOCALIZATION LEFT (ALL INC) Left 1/3/2017   • OH BLEPHAROPLASTY LOWER EYELID Bilateral 2022    Procedure: BLEPHAROPLASTY LOWER;  Surgeon: Barbara Barrios MD;  Location: [address] [place] [address] MAIN OR;  Service: Plastics   • OH BLEPHAROPLASTY UPPER EYELID W/EXCESSIVE SKIN Bilateral 2022    Procedure: BLEPHAROPLASTY UPPER;  Surgeon: Barbara Barrios MD;  Location: [address] [place] [address] MAIN OR;  Service: Plastics   • OH BX/EXC LYMPH NODE OPEN SUPERFICIAL Left 1/3/2017    Procedure: BIOPSY LYMPH NODE SENTINEL @ [address];  Surgeon: Shaneka Chu MD;  [facility];  Service: Surgical Oncology   • OH MASTECTOMY PARTIAL Left 1/3/2017    Procedure: LUMPECTOMY BREAST NEEDLE LOCALIZED @ 0930;  Surgeon: Shaneka Chu MD;  [facility];  Service: Surgical Oncology   • SENTINEL LYMPH NODE BIOPSY     • US GUIDED BREAST BIOPSY LEFT COMPLETE Left 2016     Family History   Problem Relation Age of Onset   • Lung cancer Mother         mother age 61   • Lung cancer Father         father age 66   • Breast cancer Sister         age dx unk   • No Known Problems Brother    • No Known Problems Maternal Grandmother    • No Known Problems Maternal Grandfather    • No Known Problems Paternal Grandmother    • No Known Problems Paternal Grandfather    • No Known Problems Daughter    • No Known Problems Daughter    • No Known Problems Maternal Aunt    • No Known Problems Maternal Aunt    • Breast cancer Paternal Aunt         aunt 50   • Other Half-Brother         Born no spleen -  if blood infection   • Colon cancer Neg Hx          Current Outpatient Medications:   •  ALPRAZolam (XANAX) 0 25 mg tablet, Take 0 25 mg by mouth daily as needed for anxiety, Disp: , Rfl:   •  azelastine (ASTELIN) 0 1 % nasal spray, INSTILL 2 SPRAYS INTO EACH NOSTRIL 2TIMES DAILY AS NEEDED FOR RHINITIS/ALLERGIES, Disp: 1 Bottle, Rfl: 1  •  Calcium 500 MG tablet, Take 1 tablet by mouth daily, Disp: , Rfl:   •  COLLAGEN PO, Take 1 tablet by mouth daily, Disp: , Rfl:   •  fexofenadine (ALLEGRA) 180 MG tablet, Take 1 tablet (180 mg total) by mouth daily, Disp: 90 tablet, Rfl: 2  •  fluticasone (FLONASE) 50 mcg/act nasal spray, 2 SPRAYS INTO EACH NOSTRIL DAILY  DOSES, Disp: 48 mL, Rfl: 4  •  Ibuprofen 200 MG CAPS, Take by mouth as needed, Disp: , Rfl:   •  MILK THISTLE PO, Take 1 tablet by mouth daily, Disp: , Rfl:   •  Multiple Vitamins-Minerals (MULTIVITAMIN ADULT PO), Take 1 tablet by mouth daily, Disp: , Rfl:   •  olopatadine HCl (PATADAY) 0 2 % opth drops, , Disp: , Rfl:   •  Probiotic Product (PROBIOTIC-10 PO), Take by mouth in the morning  , Disp: , Rfl:   No Known Allergies    The following portions of the patient's history were reviewed and updated as appropriate: allergies, current medications, past family history, past medical history, past social history, past surgical history and problem list         Vitals:    01/16/23 0953   BP: 122/74   Pulse: 74   Resp: 16   Temp: 98 1 °F (36 7 °C)   SpO2: 98%       Physical Exam  Constitutional:       General: She is not in acute distress  Appearance: Normal appearance  She is well-developed  HENT:      Head: Normocephalic and atraumatic  Cardiovascular:      Heart sounds: Normal heart sounds  Pulmonary:      Breath sounds: Normal breath sounds  Chest:   Breasts:     Right: No inverted nipple, mass, nipple discharge, skin change or tenderness  Left: Skin change ( Lumpectomy scar) present  No inverted nipple, mass, nipple discharge or tenderness  Abdominal:      Palpations: Abdomen is soft  Lymphadenopathy:      Upper Body:      Right upper body: No supraclavicular, axillary or pectoral adenopathy  Left upper body: No supraclavicular, axillary or pectoral adenopathy  Neurological:      Mental Status: She is alert and oriented to person, place, and time     Psychiatric:         Mood and Affect: Mood normal  Results:  Labs:  Prior genetic testing was reviewed which was BRCA only and negative    Imaging  7/14/2022 breast MRI was benign    1/4/2023 bilateral 3D diagnostic mammogram was benign BI-RADS 2 with a density of 3    I reviewed the above laboratory and imaging data  Discussion/Summary: 53-year-old female status post left breast conservation for stage I invasive ductal carcinoma  She had radiation therapy and completed 5 years of tamoxifen  There is no evidence of disease based on exam today  All of her imaging has remained benign  She continues to have dense breast tissue  She will be having a screening mammogram next year  She would like to continue with a breast MRI secondary to the dense breast tissue  I will make these arrangements for her  She will be seen in 1 year in our survivorship program   At some point, we should likely refer her back to genetics to discuss updated testing  She would like to see me 2 years from now; I will therefore alternate her annual visits with my nurse practitioner

## 2023-01-25 ENCOUNTER — TELEPHONE (OUTPATIENT)
Dept: HEMATOLOGY ONCOLOGY | Facility: CLINIC | Age: 58
End: 2023-01-25

## 2023-01-26 ENCOUNTER — APPOINTMENT (OUTPATIENT)
Dept: LAB | Facility: CLINIC | Age: 58
End: 2023-01-26

## 2023-01-26 DIAGNOSIS — Z17.0 MALIGNANT NEOPLASM OF UPPER-OUTER QUADRANT OF LEFT BREAST IN FEMALE, ESTROGEN RECEPTOR POSITIVE (HCC): ICD-10-CM

## 2023-01-26 DIAGNOSIS — C50.412 MALIGNANT NEOPLASM OF UPPER-OUTER QUADRANT OF LEFT BREAST IN FEMALE, ESTROGEN RECEPTOR POSITIVE (HCC): ICD-10-CM

## 2023-01-26 DIAGNOSIS — Z78.0 MENOPAUSE: ICD-10-CM

## 2023-01-26 LAB
ALBUMIN SERPL BCP-MCNC: 4 G/DL (ref 3.5–5)
ALP SERPL-CCNC: 65 U/L (ref 46–116)
ALT SERPL W P-5'-P-CCNC: 30 U/L (ref 12–78)
ANION GAP SERPL CALCULATED.3IONS-SCNC: 4 MMOL/L (ref 4–13)
AST SERPL W P-5'-P-CCNC: 21 U/L (ref 5–45)
BASOPHILS # BLD AUTO: 0.06 THOUSANDS/ÂΜL (ref 0–0.1)
BASOPHILS NFR BLD AUTO: 1 % (ref 0–1)
BILIRUB SERPL-MCNC: 0.39 MG/DL (ref 0.2–1)
BUN SERPL-MCNC: 22 MG/DL (ref 5–25)
CALCIUM SERPL-MCNC: 9.8 MG/DL (ref 8.3–10.1)
CANCER AG27-29 SERPL-ACNC: 15.3 U/ML (ref 0–42.3)
CHLORIDE SERPL-SCNC: 106 MMOL/L (ref 96–108)
CO2 SERPL-SCNC: 30 MMOL/L (ref 21–32)
CREAT SERPL-MCNC: 0.86 MG/DL (ref 0.6–1.3)
EOSINOPHIL # BLD AUTO: 0.14 THOUSAND/ÂΜL (ref 0–0.61)
EOSINOPHIL NFR BLD AUTO: 2 % (ref 0–6)
ERYTHROCYTE [DISTWIDTH] IN BLOOD BY AUTOMATED COUNT: 13.3 % (ref 11.6–15.1)
FSH SERPL-ACNC: 104 MIU/ML
GFR SERPL CREATININE-BSD FRML MDRD: 76 ML/MIN/1.73SQ M
GLUCOSE P FAST SERPL-MCNC: 89 MG/DL (ref 65–99)
HCT VFR BLD AUTO: 40.1 % (ref 34.8–46.1)
HGB BLD-MCNC: 12.9 G/DL (ref 11.5–15.4)
IMM GRANULOCYTES # BLD AUTO: 0.02 THOUSAND/UL (ref 0–0.2)
IMM GRANULOCYTES NFR BLD AUTO: 0 % (ref 0–2)
LYMPHOCYTES # BLD AUTO: 1.95 THOUSANDS/ÂΜL (ref 0.6–4.47)
LYMPHOCYTES NFR BLD AUTO: 29 % (ref 14–44)
MCH RBC QN AUTO: 29.7 PG (ref 26.8–34.3)
MCHC RBC AUTO-ENTMCNC: 32.2 G/DL (ref 31.4–37.4)
MCV RBC AUTO: 92 FL (ref 82–98)
MONOCYTES # BLD AUTO: 0.69 THOUSAND/ÂΜL (ref 0.17–1.22)
MONOCYTES NFR BLD AUTO: 10 % (ref 4–12)
NEUTROPHILS # BLD AUTO: 3.92 THOUSANDS/ÂΜL (ref 1.85–7.62)
NEUTS SEG NFR BLD AUTO: 58 % (ref 43–75)
NRBC BLD AUTO-RTO: 0 /100 WBCS
PLATELET # BLD AUTO: 265 THOUSANDS/UL (ref 149–390)
PMV BLD AUTO: 11.9 FL (ref 8.9–12.7)
POTASSIUM SERPL-SCNC: 4.3 MMOL/L (ref 3.5–5.3)
PROT SERPL-MCNC: 7.3 G/DL (ref 6.4–8.4)
RBC # BLD AUTO: 4.34 MILLION/UL (ref 3.81–5.12)
SODIUM SERPL-SCNC: 140 MMOL/L (ref 135–147)
WBC # BLD AUTO: 6.78 THOUSAND/UL (ref 4.31–10.16)

## 2023-01-27 ENCOUNTER — TELEPHONE (OUTPATIENT)
Age: 58
End: 2023-01-27

## 2023-01-27 NOTE — TELEPHONE ENCOUNTER
Patient calling to schedule recall colonoscopy with Dr Rosalba Soriano  Verified information  Please call to schedule

## 2023-01-30 ENCOUNTER — OFFICE VISIT (OUTPATIENT)
Dept: HEMATOLOGY ONCOLOGY | Facility: CLINIC | Age: 58
End: 2023-01-30

## 2023-01-30 VITALS
WEIGHT: 124 LBS | TEMPERATURE: 98 F | HEART RATE: 76 BPM | RESPIRATION RATE: 16 BRPM | DIASTOLIC BLOOD PRESSURE: 68 MMHG | BODY MASS INDEX: 19.93 KG/M2 | OXYGEN SATURATION: 96 % | HEIGHT: 66 IN | SYSTOLIC BLOOD PRESSURE: 122 MMHG

## 2023-01-30 DIAGNOSIS — C50.412 MALIGNANT NEOPLASM OF UPPER-OUTER QUADRANT OF LEFT BREAST IN FEMALE, ESTROGEN RECEPTOR POSITIVE (HCC): Primary | ICD-10-CM

## 2023-01-30 DIAGNOSIS — Z17.0 MALIGNANT NEOPLASM OF UPPER-OUTER QUADRANT OF LEFT BREAST IN FEMALE, ESTROGEN RECEPTOR POSITIVE (HCC): Primary | ICD-10-CM

## 2023-01-30 NOTE — PROGRESS NOTES
HPI:   In January 2017 patient had  left breast lumpectomy and sentinel lymph node sampling  for hormone receptor positive, HER-2 negative, G1, T1c, 1 4 cm, stage I invasive mammary carcinoma in the left breast Oncotype score was 10  BRCA test was negative  She received radiation  In February 2017 she was started on tamoxifen  Patient was premenopausal at that time  We had talked about ovarian suppression or BSO because results are better when combined with tamoxifen or AI  She wanted to stay on tamoxifen and go into menopause  naturally   Raysa Mccracken Patient stopped taking tamoxifen in February or March 2022 after taking tamoxifen for 5 years  She did not want to continue on tamoxifen and she did not want to be made postmenopausal and to have AI  She did not like tamoxifen but stayed on it for 5 years  She states tamoxifen was affecting her quality of life and she was having mood swings  She feels much better now not being on tamoxifen  She has no symptoms at present and even anxiety is much less  She is postmenopausal now  Last menstrual period was little over 1 year ago and [facility] is 104  Patient prefers to remain under surveillance    She follows with her breast surgeon for examination and imaging studies and she gets mammography and MRI scan because of dense breasts                Current Outpatient Medications:   •  ALPRAZolam (XANAX) 0 25 mg tablet, Take 0 25 mg by mouth daily as needed for anxiety, Disp: , Rfl:   •  azelastine (ASTELIN) 0 1 % nasal spray, INSTILL 2 SPRAYS INTO EACH NOSTRIL 2TIMES DAILY AS NEEDED FOR RHINITIS/ALLERGIES, Disp: 1 Bottle, Rfl: 1  •  Calcium 500 MG tablet, Take 1 tablet by mouth daily, Disp: , Rfl:   •  COLLAGEN PO, Take 1 tablet by mouth daily, Disp: , Rfl:   •  fexofenadine (ALLEGRA) 180 MG tablet, Take 1 tablet (180 mg total) by mouth daily, Disp: 90 tablet, Rfl: 2  •  fluticasone (FLONASE) 50 mcg/act nasal spray, 2 SPRAYS INTO EACH NOSTRIL DAILY  DOSES, Disp: 48 mL, Rfl: 4  •  Ibuprofen 200 MG CAPS, Take by mouth as needed, Disp: , Rfl:   •  MILK THISTLE PO, Take 1 tablet by mouth daily, Disp: , Rfl:   •  Multiple Vitamins-Minerals (MULTIVITAMIN ADULT PO), Take 1 tablet by mouth daily, Disp: , Rfl:   •  olopatadine HCl (PATADAY) 0 2 % opth drops, , Disp: , Rfl:   •  Probiotic Product (PROBIOTIC-10 PO), Take by mouth in the morning  , Disp: , Rfl:     No Known Allergies    Oncology History   Personal history of adenocarcinoma of breast   11/2016 Initial Diagnosis    Malignant neoplasm of upper-outer quadrant of left breast in female, estrogen receptor positive ([facility] [address] )     11/17/2016 Biopsy    Left breast biopsy: invasive and in situ ductal carcinoma      1/3/2017 Surgery    Left lumpectomy and sentinel node biopsy  Final staging: Stage IA IDC, strongly ER/AR +, HER2-     2017 Genetic Testing    Oncotype score was 10  BRCA test was negative  2/2017 -  Hormone Therapy    Tamoxifen      3/13/2017 - 4/24/2017 Radiation    left breast to a dose of 5000 cGy followed by an additional 1000 cGy to the primary site         ROS:  01/30/23 Reviewed 12 systems: See symptoms in HPI:   Presently no  neurological, cardiac, pulmonary, GI and  symptoms  Other Symptoms are in HPI  No other symptoms like fever, chills, bleeding, bone pains, skin rash, weight loss, arthritic symptoms, tiredness ,  weakness, numbness, hot flashes  claudication and gait problem  No frequent infections  Not unusually sensitive to heat or cold  No swelling of the ankles  No swollen glands  Patient is much less anxious  /68 (BP Location: Left arm, Patient Position: Sitting, Cuff Size: Adult)   Pulse 76   Temp 98 °F (36 7 °C) (Temporal)   Resp 16   Ht 5' 6" (1 676 m)   Wt 56 2 kg (124 lb)   SpO2 96%   BMI 20 01 kg/m²     Physical Exam:Alert and oriented  Patient is not in distress  Vital signs are above    No icterus, no oral thrush, no palpable neck mass, clear lung fields to percussion and auscultation,, regular heart rate,  soft and non tender abdomen, no palpable abdominal mass, no ascites, no edema of ankles, no calf tenderness, no focal neurological deficit, no skin rash, no palpable lymphadenopathy in the neck and axillary areas,  no clubbing  Patient is much less anxious  Performance status 0      IMAGING:  ASSESSMENT/BI-RADS CATEGORY:  Left: 2 - Benign  Right: 1 - Negative  Overall: 2 - Benign     RECOMMENDATION:       - Diagnostic mammogram at the current time for both breasts      Workstation ID: [id number]   Imaging    MRI breast bilateral w and wo contrast w cad (Order: [id number]) - 7/31/2021        LABS:    Results for orders placed or performed in visit on 01/26/23   CBC and differential   Result Value Ref Range    WBC 6 78 4 31 - 10 16 Thousand/uL    RBC 4 34 3 81 - 5 12 Million/uL    Hemoglobin 12 9 11 5 - 15 4 g/dL    Hematocrit 40 1 34 8 - 46 1 %    MCV 92 82 - 98 fL    MCH 29 7 26 8 - 34 3 pg    MCHC 32 2 31 4 - 37 4 g/dL    RDW 13 3 11 6 - 15 1 %    MPV 11 9 8 9 - 12 7 fL    Platelets 098 128 - 181 Thousands/uL    nRBC 0 /100 WBCs    Neutrophils Relative 58 43 - 75 %    Immat GRANS % 0 0 - 2 %    Lymphocytes Relative 29 14 - 44 %    Monocytes Relative 10 4 - 12 %    Eosinophils Relative 2 0 - 6 %    Basophils Relative 1 0 - 1 %    Neutrophils Absolute 3 92 1 85 - 7 62 Thousands/µL    Immature Grans Absolute 0 02 0 00 - 0 20 Thousand/uL    Lymphocytes Absolute 1 95 0 60 - 4 47 Thousands/µL    Monocytes Absolute 0 69 0 17 - 1 22 Thousand/µL    Eosinophils Absolute 0 14 0 00 - 0 61 Thousand/µL    Basophils Absolute 0 06 0 00 - 0 10 Thousands/µL   Comprehensive metabolic panel   Result Value Ref Range    Sodium 140 135 - 147 mmol/L    Potassium 4 3 3 5 - 5 3 mmol/L    Chloride 106 96 - 108 mmol/L    CO2 30 21 - 32 mmol/L    ANION GAP 4 4 - 13 mmol/L    BUN 22 5 - 25 mg/dL    Creatinine 0 86 0 60 - 1 30 mg/dL    Glucose, Fasting 89 65 - 99 mg/dL    Calcium 9 8 8 3 - 10 1 mg/dL AST 21 5 - 45 U/L    ALT 30 12 - 78 U/L    Alkaline Phosphatase 65 46 - 116 U/L    Total Protein 7 3 6 4 - 8 4 g/dL    Albumin 4 0 3 5 - 5 0 g/dL    Total Bilirubin 0 39 0 20 - 1 00 mg/dL    eGFR 76 ml/min/1 73sq m   Cancer antigen 27 29   Result Value Ref Range    CA 27 29 15 3 0 0 - 66 1 U/mL   Follicle stimulating hormone   Result Value Ref Range     0   mIU/mL     Labs, Imaging, & Other studies:   All pertinent labs and imaging studies were personally reviewed    Lab Results   Component Value Date    K 4 3 01/26/2023     01/26/2023    CO2 30 01/26/2023    BUN 22 01/26/2023    CREATININE 0 86 01/26/2023    GLUF 89 01/26/2023    CALCIUM 9 8 01/26/2023    AST 21 01/26/2023    ALT 30 01/26/2023    ALKPHOS 65 01/26/2023    EGFR 76 01/26/2023     Lab Results   Component Value Date    WBC 6 78 01/26/2023    HGB 12 9 01/26/2023    HCT 40 1 01/26/2023    MCV 92 01/26/2023     01/26/2023     Normal differential   Normal CA 27-29  Reviewed test results and discussed with patient  Assessment and plan: Hnanah Lan In January 2017 patient had  left breast lumpectomy and sentinel lymph node sampling  for hormone receptor positive, HER-2 negative, G1, T1c, 1 4 cm, stage I invasive mammary carcinoma in the left breast Oncotype score was 10  BRCA test was negative  She received radiation  In February 2017 she was started on tamoxifen  Patient was premenopausal at that time  We had talked about ovarian suppression or BSO because results are better when combined with tamoxifen or AI  She wanted to stay on tamoxifen and go into menopause  naturally   Hannah Lan Patient stopped taking tamoxifen in February or March 2022 after taking tamoxifen for 5 years  She did not want to continue on tamoxifen and she did not want to be made postmenopausal and to have AI  She did not like tamoxifen but stayed on it for 5 years  She states tamoxifen was affecting her quality of life and she was having mood swings    She feels much better now not being on tamoxifen  She has no symptoms at present and even anxiety is much less  She is postmenopausal now  Last menstrual period was little over 1 year ago and [facility] is [facility]  Patient prefers to remain under surveillance  She follows with her breast surgeon for examination and imaging studies and she gets mammography and MRI scan because of dense breasts    Physical examination and test results are as recorded and discussed She remains in remission from breast cancer  Stella Ness is surveillance as above   She goes to her breast surgeon for examination of breasts and imaging studies     She goes to her gynecologist   She states she is up-to-date with her medical checkups     Discussed the importance of self-breast examination, eating healthy foods, staying active and health screening tests  Patient is capable of self-care  Discussed precautions against coronavirus and flu   Eddie Hendrickson She will continue to follow with her primary physician and other consultants  See diagnoses, orders instructions    1  Malignant neoplasm of upper-outer quadrant of left breast in female, estrogen receptor positive (HCC)    - CBC and differential; Standing  - Comprehensive metabolic panel; Standing  - Cancer antigen 27 29; Standing  - CBC and differential  - Comprehensive metabolic panel  - Cancer antigen 27 29  Blood work every 6 months  Visit in 1 year  Patient voiced understanding and agreed  I used dictation device to dictate this note and there could be mistakes in my note  Counseling / Coordination of Care      Provided counseling and support

## 2023-02-07 NOTE — TELEPHONE ENCOUNTER
5 yr colon recall 4/23/18 BEC DE, WNL, Hx Breast cancer, BMI 20    Left message for pt to call back to schedule

## 2023-02-14 NOTE — PROGRESS NOTES
Assessment/Plan:  NGE                                                                                                               Uterine prolapse, rectocele with constipation, cystocele- 2 courses of pelvic physical therapy  Recommended Colace daily '22 [she took MOM 3x/wk]  BM's are daily  Does Kegel exercises nightly  L Breast Ca- fu w Dr Larry Chambers   Dx'c M 9/21- Dense Breasts, T-C 20 53 %- MRI nl 7/22  Co- Testing q 5 yrs  - now                                                                                RTO 1 yr  SBA monthly  3 D Mammography - screening 1/24, MRI 7/23 - active order  Colonoscopy  41 - '18, due '23 - to ask about bowel habits and pressure  Exercise 3/wk    -compliant              Calcium 1,000 mg/d with Vit D - compliant     Depression Screen: Neg       Diagnoses and all orders for this visit:    Encounter for annual routine gynecological examination    First degree uterine prolapse    Rectocele    Cystocele, midline    Submucous leiomyoma of uterus    Constipation, unspecified constipation type    History of left breast cancer    Family history of breast cancer in sister    Dense breast tissue on mammogram              Subjective:        Patient ID: Delvis Ponce is a 56 y o  female  Larry Chambers presents today for her yearly evaluation  She is still being followed by Dr Larry Chambers who has ordered mammography and a breast MRI  She continues to have some issues with eliminating stool  She feels pressure until stool is eliminated  Sometimes it is incomplete  Coffee will stimulate her to eliminate the rest later on  She goes once a day  She is in the process of scheduling a colonoscopy this year  I asked her to have a conversation with them  Her rectocele is not that significant  She has been compliant with Kegel exercises and the uterine prolapse, and cystocele are not noticeable made my day!!    The rectocele is mild  The following portions of the patient's history were reviewed and updated as appropriate: She  has a past medical history of Abdominal pain, left lower quadrant, Anxiety, BRCA negative, Common cold, Environmental and seasonal allergies, Exercises 5 to 6 times per week, Food allergy, Low back pain, PONV (postoperative nausea and vomiting), Seasonal allergies, Use of tamoxifen (Nolvadex), Vaginal discharge, Vaginal irritation, Vulvar burning, and Vulvar irritation    Patient Active Problem List    Diagnosis Date Noted   • Screening mammogram for breast cancer 2023   • Encounter for follow-up surveillance of breast cancer 2022   • PONV (postoperative nausea and vomiting) 2022   • Anxiety 2022   • Dermatochalasis of both eyelids 04/15/2022   • Encounter for annual routine gynecological examination 2022   • First degree uterine prolapse 2022   • Rectocele 2022   • Constipation 2022   • Cystocele, midline 2022   • Submucous leiomyoma of uterus 2022   • Family history of breast cancer in sister 2022   • Chronic seasonal allergic rhinitis due to pollen 2021   • Allergic rhinitis due to dogs 2021   • Menopause 2021   • Abnormal uterine bleeding (AUB) 2020   • Abdominal bloating 2019   • Pelvic floor relaxation 2019   • Heartburn 2019   • Coccyx pain 2018   • Varicose veins of right lower extremity with pain 2018   • Dense breast tissue 2018   • Encntr for gyn exam (general) (routine) w abnormal findings 2018   • Vaginal relaxation 2018   • Routine screening for STI (sexually transmitted infection) 2018   • Vaginal discharge 2018   • Right lower quadrant pain 2018   • Rectal pain 2018   • External hemorrhoid 2018   • Narrowing of stools 2018   • Personal history of adenocarcinoma of breast 2017   PMH:   2 para 2;  x 2; term  FH breast cancer- Sr   age 46      BRCA negative      Menorrhagia '13      L Breast Ca  left lumpectomy and radiation      Anxiety      Carpal Tunnel       Pelvic floor relaxation- - referred for physical therapy, only went to 2 visits  Blepharoplasty-   She  has a past surgical history that includes Appendectomy; pr bx/exc lymph node open superficial (Left, 1/3/2017); Colonoscopy; [place] lymph node biopsy; US guided breast biopsy left complete (Left, 2016); Mammo needle localization left (all inc) (Left, 1/3/2017); Breast biopsy (Left, 2016); pr mastectomy partial (Left, 1/3/2017); pr blepharoplasty upper eyelid w/excessive skin (Bilateral, 2022); and pr blepharoplasty lower eyelid (Bilateral, 2022)  Her family history includes Breast cancer in her paternal aunt and sister; Lung cancer in her father and mother; No Known Problems in her brother, daughter, daughter, maternal aunt, maternal aunt, maternal grandfather, maternal grandmother, paternal grandfather, and paternal grandmother; Other in her half-brother  FH:  S - Breast Ca  PA - Breast Ca  She  reports that she has been smoking cigarettes  She has never used smokeless tobacco  She reports current alcohol use  She reports that she does not use drugs  SH:    Lives in [facility]  She has been working from home since the pandemic  Oldest daughter Paola Tobias  I delivered [place]  She is working in [facility]  Her mother-in-law  '    Current Outpatient Medications   Medication Sig Dispense Refill   • ALPRAZolam (XANAX) 0 25 mg tablet Take 0 25 mg by mouth daily as needed for anxiety     • azelastine (ASTELIN) 0 1 % nasal spray INSTILL 2 SPRAYS INTO EACH NOSTRIL 2TIMES DAILY AS NEEDED FOR RHINITIS/ALLERGIES 1 Bottle 1   • Calcium 500 MG tablet Take 1 tablet by mouth daily     • COLLAGEN PO Take 1 tablet by mouth daily     • fexofenadine (ALLEGRA) 180 MG tablet Take 1 tablet (180 mg total) by mouth daily 90 tablet 2   • fluticasone (FLONASE) 50 mcg/act nasal spray 2 SPRAYS INTO EACH NOSTRIL DAILY  DOSES 48 mL 4   • Ibuprofen 200 MG CAPS Take by mouth as needed     • MILK THISTLE PO Take 1 tablet by mouth daily     • Multiple Vitamins-Minerals (MULTIVITAMIN ADULT PO) Take 1 tablet by mouth daily     • Probiotic Product (PROBIOTIC-10 PO) Take by mouth in the morning         No current facility-administered medications for this visit  Current Outpatient Medications on File Prior to Visit   Medication Sig   • ALPRAZolam (XANAX) 0 25 mg tablet Take 0 25 mg by mouth daily as needed for anxiety   • azelastine (ASTELIN) 0 1 % nasal spray INSTILL 2 SPRAYS INTO EACH NOSTRIL 2TIMES DAILY AS NEEDED FOR RHINITIS/ALLERGIES   • Calcium 500 MG tablet Take 1 tablet by mouth daily   • COLLAGEN PO Take 1 tablet by mouth daily   • fexofenadine (ALLEGRA) 180 MG tablet Take 1 tablet (180 mg total) by mouth daily   • fluticasone (FLONASE) 50 mcg/act nasal spray 2 SPRAYS INTO EACH NOSTRIL DAILY  DOSES   • Ibuprofen 200 MG CAPS Take by mouth as needed   • MILK THISTLE PO Take 1 tablet by mouth daily   • Multiple Vitamins-Minerals (MULTIVITAMIN ADULT PO) Take 1 tablet by mouth daily   • [DISCONTINUED] olopatadine HCl (PATADAY) 0 2 % opth drops    • Probiotic Product (PROBIOTIC-10 PO) Take by mouth in the morning       No current facility-administered medications on file prior to visit  She has No Known Allergies       Review of Systems   Constitutional: Negative for activity change, appetite change, fatigue and unexpected weight change  Eyes: Negative for visual disturbance  Respiratory: Negative for cough, chest tightness, shortness of breath and wheezing  Cardiovascular: Negative for chest pain, palpitations and leg swelling  Breast: Patient denies tenderness, nipple discharge, masses, or erythema     Gastrointestinal: Negative for abdominal distention, abdominal pain, blood in stool, constipation, diarrhea, nausea and vomiting  Genitourinary: Negative for decreased urine volume, difficulty urinating, dyspareunia, dysuria, frequency, hematuria, menstrual problem, pelvic pain, urgency, vaginal bleeding, vaginal discharge and vaginal pain  No incontinence  Brent once a month  No rectal urgency, excessive flatus, or incontinence  Occasionally strains  She notes rectal pressure if she has not had a bowel movement   Musculoskeletal: Negative for arthralgias  Skin: Negative for rash  Neurological: Negative for weakness, light-headedness, numbness and headaches  Hematological: Does not bruise/bleed easily  Psychiatric/Behavioral: Negative for agitation, behavioral problems and sleep disturbance  The patient is not nervous/anxious  Objective:    Vitals:    02/15/23 0857   BP: 102/70   BP Location: Right arm   Patient Position: Sitting   Cuff Size: Standard   Weight: 56 4 kg (124 lb 6 4 oz)   Height: 5' 4" (1 626 m)            Physical Exam  Vitals and nursing note reviewed  Exam conducted with a chaperone present  Constitutional:       Appearance: Normal appearance  She is well-developed  HENT:      Head: Normocephalic and atraumatic  Eyes:      General: No scleral icterus  Right eye: No discharge  Left eye: No discharge  Extraocular Movements: Extraocular movements intact  Conjunctiva/sclera: Conjunctivae normal    Neck:      Thyroid: No thyromegaly  Trachea: No tracheal deviation  Cardiovascular:      Rate and Rhythm: Normal rate and regular rhythm  Heart sounds: Normal heart sounds  No murmur heard  Pulmonary:      Effort: Pulmonary effort is normal  No respiratory distress  Breath sounds: Normal breath sounds  No wheezing  Chest:   Breasts:     Breasts are symmetrical       Right: No inverted nipple, mass, nipple discharge, skin change or tenderness        Left: No inverted nipple, mass, nipple discharge, skin change or tenderness  Abdominal:      General: Bowel sounds are normal  There is no distension  Palpations: Abdomen is soft  There is no mass  Tenderness: There is no abdominal tenderness  There is no guarding or rebound  Genitourinary:     General: Normal vulva  Exam position: Supine  Labia:         Right: No rash, tenderness or lesion  Left: No rash, tenderness or lesion  Vagina: Normal       Cervix: No cervical motion tenderness or discharge  Uterus: Not deviated, not enlarged and not tender  Adnexa:         Right: No mass, tenderness or fullness  Left: No mass, tenderness or fullness  Rectum: No external hemorrhoid  Comments: Urethral meatus within normal limits  Perineum within normal limits  Mild vaginal atrophy, Minimal cystocele, first-degree rectocele  Previously seen uterine prolapse is not evident today  The uterus is anteverted and small  There is no suggestion of a fibroid  Musculoskeletal:         General: Normal range of motion  Cervical back: Normal range of motion and neck supple  Skin:     General: Skin is warm and dry  Neurological:      Mental Status: She is alert and oriented to person, place, and time  Psychiatric:         Mood and Affect: Mood normal          Behavior: Behavior normal          Thought Content:  Thought content normal          Judgment: Judgment normal

## 2023-02-15 ENCOUNTER — PREP FOR PROCEDURE (OUTPATIENT)
Age: 58
End: 2023-02-15

## 2023-02-15 ENCOUNTER — ANNUAL EXAM (OUTPATIENT)
Dept: OBGYN CLINIC | Facility: CLINIC | Age: 58
End: 2023-02-15

## 2023-02-15 VITALS
WEIGHT: 124.4 LBS | BODY MASS INDEX: 21.24 KG/M2 | DIASTOLIC BLOOD PRESSURE: 70 MMHG | SYSTOLIC BLOOD PRESSURE: 102 MMHG | HEIGHT: 64 IN

## 2023-02-15 DIAGNOSIS — Z80.3 FAMILY HISTORY OF BREAST CANCER IN SISTER: ICD-10-CM

## 2023-02-15 DIAGNOSIS — N81.2 FIRST DEGREE UTERINE PROLAPSE: ICD-10-CM

## 2023-02-15 DIAGNOSIS — R92.2 DENSE BREAST TISSUE ON MAMMOGRAM: ICD-10-CM

## 2023-02-15 DIAGNOSIS — Z85.3 HISTORY OF LEFT BREAST CANCER: ICD-10-CM

## 2023-02-15 DIAGNOSIS — Z85.3 PERSONAL HISTORY OF BREAST CANCER: ICD-10-CM

## 2023-02-15 DIAGNOSIS — N81.11 CYSTOCELE, MIDLINE: ICD-10-CM

## 2023-02-15 DIAGNOSIS — D25.0 SUBMUCOUS LEIOMYOMA OF UTERUS: ICD-10-CM

## 2023-02-15 DIAGNOSIS — K59.00 CONSTIPATION, UNSPECIFIED CONSTIPATION TYPE: ICD-10-CM

## 2023-02-15 DIAGNOSIS — Z12.11 COLON CANCER SCREENING: Primary | ICD-10-CM

## 2023-02-15 DIAGNOSIS — Z01.419 ENCOUNTER FOR ANNUAL ROUTINE GYNECOLOGICAL EXAMINATION: Primary | ICD-10-CM

## 2023-02-15 DIAGNOSIS — N81.6 RECTOCELE: ICD-10-CM

## 2023-04-12 NOTE — H&P (VIEW-ONLY)
[facility]  [facility]  [facility] [address], [address]  (B) [phone] ~ (f) [phone]    Susanna Cleary   1965   [id number]       ASSESSMENT:    Diagnosis ICD-10-CM Associated Orders   1  Allergic conjunctivitis of both eyes  H10 13       2  Chronic rhinitis  J31 0 fluticasone (FLONASE) 50 mcg/act nasal spray     azelastine (ASTELIN) 0 1 % nasal spray      3  Allergic rhinitis due to dogs  J30 81 fluticasone (FLONASE) 50 mcg/act nasal spray     azelastine (ASTELIN) 0 1 % nasal spray      4  Chronic seasonal allergic rhinitis due to pollen  J30 1 fluticasone (FLONASE) 50 mcg/act nasal spray     azelastine (ASTELIN) 0 1 % nasal spray      5  Intrinsic atopic dermatitis  L20 84       6  Seasonal allergic rhinitis due to pollen  J30 1             Orders  No orders of the defined types were placed in this encounter  ASSESSMENT and Plan:      ALLERGIC RHINITIS/CONJUNCTIVITIS (J30 89, J30 81, J30 1, H10 13, J31 0) VASOMOTOR RHINITIS (J30 0)   WORSENING SYMPTOMS or UNSTABLE  SKIN TESTING (11/30/2021)+ DOG, GRASS   Environmental controls were reviewed for all relevant allergens  Common respiratory irritants and vasomotor triggers were reviewed and avoidance techniques recommended  Stepwise approach to treatment reviewed including environmental controls, medications and allergen immunotherapy  Risk and benefits of treatment options was reviewed  Mometasone nose spray is the preferred agent, however due to cost/prescription coverage and alternative more cost effect option was discussed  MEDICATIONS  Fluticasone nasal spray 1-2 sprays/nostril daily   Fexofenadine 1 pill daily  Olopatadine eye drops prn    ATOPIC DERMATITIS  (L20 84)  UNSTABLE  Skin testing (11/30/2021) was negative therefore an IgE mediated food allergy cause is unlikely      Reviewed optimal skin care (sensitive skin products, emollient moisturizers to use daily, avoidance of harsh chemicals and skin irritants, avoidance of stress and microbes)      FOOD INTOLERANCE:    WORSENING SYMPTOMS / UNSTABLE   Review intolerance versus IgE mediated allergy with patient  A negative skin test excludes an IgE mediated food allergy by 95%  Eating Scallops  Follow up:  6 -12 months or sooner if required  Thank you for allowing me to participate in the care of your patient  Please call the office at  if you have any questions or concerns  Sincerely,     Hernesto Kruse  Fellow of American Academy of Allergy, Asthma & Immunology  Fellow of [employer] All[employer], Asthma & Immunology        __________________________________________________________________________________________________      History of Present Illness   Physician Requesting Consult: Dickson Schaeffer DO      Reason for Consult: Allergy and Immunology Follow Up    Cc: Allergy and Immunology Follow Up    HPI: aHresh Liao is a 56y o  year old female who presents for allergy and immunology follow up of ALLERGIC RHINITIS, ALLERGIC CONJUNCTIVITIS, ATOPIC DERMATITIS  SKIN TESTING on 11/30/2021: DOG and GRASS  Works at [employer]  Now working in bedroom  Evaluated by Dr Yajaira Read who diagnosed her with a deviated nasal septum  Current dog (10years old) very hairy labrador (moose)  Last evaluated in this office on 9/7/2022    SKIN TEST (11/30/2021) demonstrated allergy to dog and grass  Implemented environmental controls  Avoiding respiratory irritants  Allergy symptoms have been less controlled recently  Following environmental controls  Increase in nasal congestion, rhinorrhea, and post nasal drip  Some itchy, tearing and red eyes  Taking otc and rx medications  Taking FLONASE  Not using Azelastine  Saw ent who suggested surgery   om medications  Eating Scallops and tolerating them        Dry skin - using creams that are free and clear    REVIEW:      ALLERGIC RHINITIS:  She has had nasal congestion, rhinorrhea and post nasal drip  Occasional sneezing  One side of the nose is not worse than the other  Normal sense of smell and taste  Often associated with itchy, tearing and red eyes  Rarely ear popping and fullness  Occasionally has sinus headaches over the frontal/maxillary region of head  Some snoring, waking with a dry mouth and fatigued  Patient has tried over the counter and prescription medications such as Zyrtec, Cetirizine, Xyzal, Levocetirizine, Allegra, Fexofenadine, Claritin, Loratadine, Clarinex, Desloratadine, Benadryl, Diphenhydramine without improvement of symptoms  Nasal sprays such as Fluticasone, Nasonex, Mometasone, Rhinocort, Budesonide, Nasacort, Triamcinolone, Flonase, Nasarel, Flunisolide and Flonase Sensimist were tried without improvement of symptoms  Known triggers include year round with some seasonal flares  Not triggered by weather such as dampness,humidity, rain or cold air  Other triggers include exposures such as dog  Not triggered by exposures such as perfumes, aerosols, smoke, paint, cleaning, detergents, leaves, grasses, trees, dust or mold  FOOD:  Childhood ate scallops  Told by mother got very sick with hives and vomiting  She tolerates all other shellfish and fish  AD:  She has generally sensitive, dry and itchy skin  Review of Systems   Constitutional: Negative for activity change, appetite change, chills and diaphoresis  HENT: Positive for congestion, postnasal drip, rhinorrhea and sneezing  Negative for ear discharge, facial swelling, sinus pressure, sinus pain and tinnitus  Eyes: Negative for photophobia, pain, discharge, redness and visual disturbance  Respiratory: Negative for apnea, cough, chest tightness, shortness of breath, wheezing and stridor  Cardiovascular: Negative for chest pain, palpitations and leg swelling  Gastrointestinal: Negative for abdominal distention, abdominal pain, blood in stool, constipation, diarrhea, nausea and vomiting  Endocrine: Negative for cold intolerance, heat intolerance, polydipsia, polyphagia and polyuria  Genitourinary: Negative for decreased urine volume, difficulty urinating, dysuria, flank pain, frequency, hematuria and urgency  Musculoskeletal: Negative for gait problem, joint swelling, neck pain and neck stiffness  Skin: Negative for color change, pallor and wound  Allergic/Immunologic: Positive for environmental allergies  Negative for immunocompromised state  Neurological: Negative for tremors, seizures, syncope, facial asymmetry, weakness, light-headedness and numbness  Hematological: Negative for adenopathy  Does not bruise/bleed easily  Psychiatric/Behavioral: Negative for behavioral problems, confusion and dysphoric mood          Historical Information   Past Medical History:   Diagnosis Date    Abdominal pain, left lower quadrant     Anxiety     BRCA negative     Common cold     Environmental and seasonal allergies     Exercises 5 to 6 times per week     Food allergy     scallops    Low back pain     PONV (postoperative nausea and vomiting)     Seasonal allergies     Use of tamoxifen (Nolvadex)     Vaginal discharge     Vaginal irritation     Vulvar burning     Vulvar irritation      Past Surgical History:   Procedure Laterality Date    APPENDECTOMY      BREAST BIOPSY Left 11/17/2016    U/S BX    COLONOSCOPY      MAMMO NEEDLE LOCALIZATION LEFT (ALL INC) Left 1/3/2017    SC BLEPHAROPLASTY LOWER EYELID Bilateral 4/22/2022    Procedure: BLEPHAROPLASTY LOWER;  Surgeon: Jared Clark MD;  Location: [address] [place] [address] MAIN OR;  Service: Plastics    SC BLEPHAROPLASTY UPPER EYELID W/EXCESSIVE SKIN Bilateral 4/22/2022    Procedure: BLEPHAROPLASTY UPPER;  Surgeon: Jared Clark MD;  Location:  MAIN OR;  Service: Plastics    SC BX/EXC LYMPH NODE OPEN SUPERFICIAL Left 1/3/2017    Procedure: BIOPSY LYMPH NODE SENTINEL @ [address];  Surgeon: Jonelle Jean Baptiste MD;  Location: [facility] Main [facility];  Service: Surgical Oncology    SC MASTECTOMY PARTIAL Left 1/3/2017    Procedure: LUMPECTOMY BREAST NEEDLE LOCALIZED @ 0930;  Surgeon: Jonelle Jean Baptiste MD;  Location: [facility];  Service: Surgical Oncology    SENTINEL LYMPH NODE BIOPSY      US GUIDED BREAST BIOPSY LEFT COMPLETE Left 2016     Social History   Social History     Substance and Sexual Activity   Alcohol Use Yes    Comment: 5 beverages/wk     Social History     Substance and Sexual Activity   Drug Use Never     Social History     Tobacco Use   Smoking Status Some Days    Types: Cigarettes   Smokeless Tobacco Never   Tobacco Comments    occasionally/socially smokes -rarely     Family History   Problem Relation Age of Onset    Lung cancer Mother         mother age 61    Lung cancer Father         father age 66    Breast cancer Sister         age dx unk    No Known Problems Brother     No Known Problems Maternal Grandmother     No Known Problems Maternal Grandfather     No Known Problems Paternal Grandmother     No Known Problems Paternal Grandfather     No Known Problems Daughter     No Known Problems Daughter     No Known Problems Maternal Aunt     No Known Problems Maternal Aunt     Breast cancer Paternal Aunt         aunt 50    Other Half-Brother         Born no spleen -  if blood infection    Colon cancer Neg Hx           Current Outpatient Medications   Medication Sig Dispense Refill    ALPRAZolam (XANAX) 0 25 mg tablet Take 0 25 mg by mouth daily as needed for anxiety      azelastine (ASTELIN) 0 1 % nasal spray 1 spray into each nostril 2 (two) times a day Use in each nostril as directed 30 mL 11    Calcium 500 MG tablet Take 1 tablet by mouth daily      COLLAGEN PO Take 1 tablet by mouth daily      fexofenadine (ALLEGRA) 180 MG tablet Take 1 tablet (180 mg total) by mouth "daily 90 tablet 2    fluticasone (FLONASE) 50 mcg/act nasal spray 2 sprays into each nostril daily for 365 doses 48 mL 4    Ibuprofen 200 MG CAPS Take by mouth as needed      MILK THISTLE PO Take 1 tablet by mouth daily      Multiple Vitamins-Minerals (MULTIVITAMIN ADULT PO) Take 1 tablet by mouth daily      Probiotic Product (PROBIOTIC-10 PO) Take by mouth in the morning         No current facility-administered medications for this visit  No Known Allergies    No problem-specific Assessment & Plan notes found for this encounter  /66   Pulse 77   Temp 98 1 °F (36 7 °C)   Resp 16   Ht 5' 4\" (1 626 m)   Wt 56 7 kg (125 lb)   BMI 21 46 kg/m²       Physical Exam  Constitutional:       General: She is not in acute distress  Appearance: Normal appearance  She is well-developed  She is not diaphoretic  HENT:      Head: Normocephalic and atraumatic  Right Ear: Tympanic membrane, ear canal and external ear normal       Left Ear: Tympanic membrane, ear canal and external ear normal       Nose: Septal deviation present  No mucosal edema, congestion or rhinorrhea  Right Turbinates: Not enlarged or swollen  Left Turbinates: Not enlarged or swollen  Right Sinus: No maxillary sinus tenderness or frontal sinus tenderness  Left Sinus: No maxillary sinus tenderness or frontal sinus tenderness  Mouth/Throat:      Pharynx: No oropharyngeal exudate  Eyes:      General: Lids are normal  No scleral icterus  Extraocular Movements:      Right eye: Normal extraocular motion  Left eye: Normal extraocular motion  Conjunctiva/sclera: Conjunctivae normal       Right eye: Right conjunctiva is not injected  No exudate  Left eye: Left conjunctiva is not injected  No exudate  Pupils: Pupils are equal, round, and reactive to light  Neck:      Trachea: Trachea normal  No tracheal deviation  Cardiovascular:      Rate and Rhythm: Normal rate and regular rhythm   " Heart sounds: Normal heart sounds  Pulmonary:      Effort: No accessory muscle usage or respiratory distress  Breath sounds: No stridor  No decreased breath sounds, wheezing, rhonchi or rales  Abdominal:      General: Bowel sounds are normal       Palpations: Abdomen is soft  Musculoskeletal:         General: Normal range of motion  Cervical back: Full passive range of motion without pain and neck supple  Skin:     General: Skin is warm and dry  Findings: No rash  Nails: There is no clubbing  Neurological:      Mental Status: She is alert  Psychiatric:         Speech: Speech normal          Behavior: Behavior normal  Behavior is cooperative  Thought Content: Thought content normal          Judgment: Judgment normal               Assessment:    Diagnosis ICD-10-CM Associated Orders   1  Allergic conjunctivitis of both eyes  H10 13       2  Chronic rhinitis  J31 0 fluticasone (FLONASE) 50 mcg/act nasal spray     azelastine (ASTELIN) 0 1 % nasal spray      3  Allergic rhinitis due to dogs  J30 81 fluticasone (FLONASE) 50 mcg/act nasal spray     azelastine (ASTELIN) 0 1 % nasal spray      4  Chronic seasonal allergic rhinitis due to pollen  J30 1 fluticasone (FLONASE) 50 mcg/act nasal spray     azelastine (ASTELIN) 0 1 % nasal spray      5  Intrinsic atopic dermatitis  L20 84       6  Seasonal allergic rhinitis due to pollen  J30 1             Orders  No orders of the defined types were placed in this encounter  Thank you for allowing me to participate in the care of your patient  Please call the office at  if you have any questions or concerns        Sincerely,     Juliocesar Berman  Fellow of American Academy of Allergy, Asthma & Immunology  Fellow of [employer] of [facility]      Cc:  Baldomero Martell DO

## 2023-04-28 ENCOUNTER — HOSPITAL ENCOUNTER (OUTPATIENT)
Dept: GASTROENTEROLOGY | Facility: HOSPITAL | Age: 58
Setting detail: OUTPATIENT SURGERY
Discharge: HOME | End: 2023-04-28
Attending: COLON & RECTAL SURGERY

## 2023-04-28 ENCOUNTER — ANESTHESIA EVENT (OUTPATIENT)
Dept: GASTROENTEROLOGY | Facility: HOSPITAL | Age: 58
End: 2023-04-28

## 2023-04-28 ENCOUNTER — ANESTHESIA (OUTPATIENT)
Dept: GASTROENTEROLOGY | Facility: HOSPITAL | Age: 58
End: 2023-04-28

## 2023-04-28 VITALS
BODY MASS INDEX: 20.18 KG/M2 | RESPIRATION RATE: 20 BRPM | OXYGEN SATURATION: 98 % | TEMPERATURE: 97 F | SYSTOLIC BLOOD PRESSURE: 105 MMHG | HEIGHT: 66 IN | DIASTOLIC BLOOD PRESSURE: 66 MMHG | HEART RATE: 86 BPM

## 2023-04-28 DIAGNOSIS — Z85.3 PERSONAL HISTORY OF BREAST CANCER: ICD-10-CM

## 2023-04-28 DIAGNOSIS — Z12.11 COLON CANCER SCREENING: ICD-10-CM

## 2023-04-28 RX ORDER — ONDANSETRON 2 MG/ML
INJECTION INTRAMUSCULAR; INTRAVENOUS AS NEEDED
Status: DISCONTINUED | OUTPATIENT
Start: 2023-04-28 | End: 2023-04-28

## 2023-04-28 RX ORDER — SODIUM CHLORIDE 9 MG/ML
INJECTION, SOLUTION INTRAVENOUS CONTINUOUS PRN
Status: DISCONTINUED | OUTPATIENT
Start: 2023-04-28 | End: 2023-04-28

## 2023-04-28 RX ORDER — PROPOFOL 10 MG/ML
INJECTION, EMULSION INTRAVENOUS AS NEEDED
Status: DISCONTINUED | OUTPATIENT
Start: 2023-04-28 | End: 2023-04-28

## 2023-04-28 RX ORDER — LIDOCAINE HYDROCHLORIDE 10 MG/ML
INJECTION, SOLUTION EPIDURAL; INFILTRATION; INTRACAUDAL; PERINEURAL AS NEEDED
Status: DISCONTINUED | OUTPATIENT
Start: 2023-04-28 | End: 2023-04-28

## 2023-04-28 RX ORDER — PROPOFOL 10 MG/ML
INJECTION, EMULSION INTRAVENOUS CONTINUOUS PRN
Status: DISCONTINUED | OUTPATIENT
Start: 2023-04-28 | End: 2023-04-28

## 2023-04-28 RX ADMIN — LIDOCAINE HYDROCHLORIDE 50 MG: 10 INJECTION, SOLUTION EPIDURAL; INFILTRATION; INTRACAUDAL; PERINEURAL at 10:40

## 2023-04-28 RX ADMIN — PROPOFOL 50 MG: 10 INJECTION, EMULSION INTRAVENOUS at 10:45

## 2023-04-28 RX ADMIN — PROPOFOL 50 MG: 10 INJECTION, EMULSION INTRAVENOUS at 10:43

## 2023-04-28 RX ADMIN — PROPOFOL 100 MCG/KG/MIN: 10 INJECTION, EMULSION INTRAVENOUS at 10:40

## 2023-04-28 RX ADMIN — ONDANSETRON 4 MG: 2 INJECTION INTRAMUSCULAR; INTRAVENOUS at 10:36

## 2023-04-28 RX ADMIN — PROPOFOL 50 MG: 10 INJECTION, EMULSION INTRAVENOUS at 11:01

## 2023-04-28 RX ADMIN — SODIUM CHLORIDE: 9 INJECTION, SOLUTION INTRAVENOUS at 10:36

## 2023-04-28 RX ADMIN — PROPOFOL 150 MG: 10 INJECTION, EMULSION INTRAVENOUS at 10:40

## 2023-04-28 NOTE — ANESTHESIA POSTPROCEDURE EVALUATION
Post-Op Assessment Note    CV Status:  Stable  Pain Score: 0    Pain management: adequate     Mental Status:  Alert and awake   Hydration Status:  Euvolemic   PONV Controlled:  Controlled   Airway Patency:  Patent      Post Op Vitals Reviewed: Yes      Staff: Anesthesiologist, CRNA         There were no known notable events for this encounter      BP   105/66   Temp   97 0   Pulse   89   Resp   12   SpO2   99% RA

## 2023-04-28 NOTE — INTERVAL H&P NOTE
Personal history of breast cancer  Screening for colon cancer  Last colonoscopy 2018  Screening colonoscopy,discussed in a face-to-face, personal, informed consent process, the benefits, alternatives, risks including not limited to bleeding, missed lesion, perforation requiring emergent surgery discussed/understood  H&P reviewed  After examining the patient I find no changes in the patients condition since the H&P had been written      Vitals:    04/28/23 0956   BP: 124/76   Pulse: 79   Resp: 18   Temp: 98 4 °F (36 9 °C)   SpO2: 100%

## 2023-04-28 NOTE — ANESTHESIA PREPROCEDURE EVALUATION
Procedure:  COLONOSCOPY    Relevant Problems   ANESTHESIA   (+) PONV (postoperative nausea and vomiting)      CARDIO   (-) HTN (hypertension)   (-) MI (myocardial infarction) (HCC)      ENDO   (-) Diabetes mellitus, type 2 (HCC)   (-) Hyperthyroidism   (-) Hypothyroidism      GI/HEPATIC   (-) Gastroesophageal reflux disease      /RENAL (within normal limits)      GYN (within normal limits)      HEMATOLOGY (within normal limits)      MUSCULOSKELETAL   (+) Coccyx pain   (+) Pelvic floor relaxation      NEURO/PSYCH   (+) Anxiety   (+) Encounter for follow-up surveillance of breast cancer   (+) Personal history of adenocarcinoma of breast      PULMONARY   (-) Asthma   (-) Sleep apnea        Physical Exam    Airway    Mallampati score: III  TM Distance: >3 FB  Neck ROM: full     Dental   No notable dental hx     Cardiovascular      Pulmonary      Other Findings        Anesthesia Plan  ASA Score- 2     Anesthesia Type- IV sedation with anesthesia with ASA Monitors  Additional Monitors:   Airway Plan:           Plan Factors-Exercise tolerance (METS): >4 METS  Chart reviewed  EKG reviewed  Existing labs reviewed  Patient summary reviewed  Patient is a current smoker  Induction- intravenous  Postoperative Plan-     Informed Consent- Anesthetic plan and risks discussed with patient  I personally reviewed this patient with the CRNA  Discussed and agreed on the Anesthesia Plan with the CRNA  Patric Koenig

## 2023-06-27 ENCOUNTER — TELEPHONE (OUTPATIENT)
Dept: HEMATOLOGY ONCOLOGY | Facility: CLINIC | Age: 58
End: 2023-06-27

## 2023-06-27 NOTE — TELEPHONE ENCOUNTER
Darwyn Babinski called in to go over provider codes for blood work that needs to be done and is requesting a follow up with patient

## 2023-06-28 NOTE — TELEPHONE ENCOUNTER
I attempted to call Candis Champion  back  I was unable to get through to a representative to transfer me to speak with her    This number took me to an automated system

## 2023-07-13 ENCOUNTER — HOSPITAL ENCOUNTER (OUTPATIENT)
Dept: RADIOLOGY | Facility: HOSPITAL | Age: 58
Discharge: HOME | End: 2023-07-13
Attending: SURGERY
Payer: COMMERCIAL

## 2023-07-13 DIAGNOSIS — R92.2 DENSE BREAST TISSUE: ICD-10-CM

## 2023-07-13 DIAGNOSIS — Z85.3 PERSONAL HISTORY OF ADENOCARCINOMA OF BREAST: ICD-10-CM

## 2023-07-13 PROCEDURE — C8908 MRI W/O FOL W/CONT, BREAST,: HCPCS

## 2023-07-13 PROCEDURE — A9585 GADOBUTROL INJECTION: HCPCS | Performed by: SURGERY

## 2023-07-13 PROCEDURE — C8937 CAD BREAST MRI: HCPCS

## 2023-07-13 RX ADMIN — GADOBUTROL 5 ML: 604.72 INJECTION INTRAVENOUS at 17:15

## 2023-09-13 ENCOUNTER — APPOINTMENT (OUTPATIENT)
Dept: LAB | Facility: CLINIC | Age: 58
End: 2023-09-13
Payer: COMMERCIAL

## 2023-09-13 LAB
ALBUMIN SERPL BCP-MCNC: 4.5 G/DL (ref 3.5–5)
ALP SERPL-CCNC: 56 U/L (ref 34–104)
ALT SERPL W P-5'-P-CCNC: 23 U/L (ref 7–52)
ANION GAP SERPL CALCULATED.3IONS-SCNC: 9 MMOL/L
AST SERPL W P-5'-P-CCNC: 28 U/L (ref 13–39)
BASOPHILS # BLD AUTO: 0.05 THOUSANDS/ÂΜL (ref 0–0.1)
BASOPHILS NFR BLD AUTO: 1 % (ref 0–1)
BILIRUB SERPL-MCNC: 0.96 MG/DL (ref 0.2–1)
BUN SERPL-MCNC: 12 MG/DL (ref 5–25)
CALCIUM SERPL-MCNC: 9.9 MG/DL (ref 8.4–10.2)
CHLORIDE SERPL-SCNC: 99 MMOL/L (ref 96–108)
CO2 SERPL-SCNC: 29 MMOL/L (ref 21–32)
CREAT SERPL-MCNC: 0.73 MG/DL (ref 0.6–1.3)
EOSINOPHIL # BLD AUTO: 0.12 THOUSAND/ÂΜL (ref 0–0.61)
EOSINOPHIL NFR BLD AUTO: 2 % (ref 0–6)
ERYTHROCYTE [DISTWIDTH] IN BLOOD BY AUTOMATED COUNT: 13.4 % (ref 11.6–15.1)
GFR SERPL CREATININE-BSD FRML MDRD: 92 ML/MIN/1.73SQ M
GLUCOSE P FAST SERPL-MCNC: 73 MG/DL (ref 65–99)
HCT VFR BLD AUTO: 42 % (ref 34.8–46.1)
HGB BLD-MCNC: 13.4 G/DL (ref 11.5–15.4)
IMM GRANULOCYTES # BLD AUTO: 0.02 THOUSAND/UL (ref 0–0.2)
IMM GRANULOCYTES NFR BLD AUTO: 0 % (ref 0–2)
LYMPHOCYTES # BLD AUTO: 1.72 THOUSANDS/ÂΜL (ref 0.6–4.47)
LYMPHOCYTES NFR BLD AUTO: 34 % (ref 14–44)
MCH RBC QN AUTO: 29.6 PG (ref 26.8–34.3)
MCHC RBC AUTO-ENTMCNC: 31.9 G/DL (ref 31.4–37.4)
MCV RBC AUTO: 93 FL (ref 82–98)
MONOCYTES # BLD AUTO: 0.65 THOUSAND/ÂΜL (ref 0.17–1.22)
MONOCYTES NFR BLD AUTO: 13 % (ref 4–12)
NEUTROPHILS # BLD AUTO: 2.58 THOUSANDS/ÂΜL (ref 1.85–7.62)
NEUTS SEG NFR BLD AUTO: 50 % (ref 43–75)
NRBC BLD AUTO-RTO: 0 /100 WBCS
PLATELET # BLD AUTO: 245 THOUSANDS/UL (ref 149–390)
PMV BLD AUTO: 12.6 FL (ref 8.9–12.7)
POTASSIUM SERPL-SCNC: 4.1 MMOL/L (ref 3.5–5.3)
PROT SERPL-MCNC: 7.3 G/DL (ref 6.4–8.4)
RBC # BLD AUTO: 4.53 MILLION/UL (ref 3.81–5.12)
SODIUM SERPL-SCNC: 137 MMOL/L (ref 135–147)
WBC # BLD AUTO: 5.14 THOUSAND/UL (ref 4.31–10.16)

## 2023-09-14 LAB — CANCER AG27-29 SERPL-ACNC: 20.7 U/ML (ref 0–38.6)

## 2024-01-08 ENCOUNTER — HOSPITAL ENCOUNTER (OUTPATIENT)
Dept: MAMMOGRAPHY | Facility: CLINIC | Age: 59
Discharge: HOME | End: 2024-01-08
Payer: COMMERCIAL

## 2024-01-08 VITALS — BODY MASS INDEX: 19.61 KG/M2 | HEIGHT: 66 IN | WEIGHT: 122 LBS

## 2024-01-08 DIAGNOSIS — Z12.31 SCREENING MAMMOGRAM FOR BREAST CANCER: ICD-10-CM

## 2024-01-08 PROCEDURE — 77067 SCR MAMMO BI INCL CAD: CPT

## 2024-01-08 PROCEDURE — 77063 BREAST TOMOSYNTHESIS BI: CPT

## 2024-01-17 ENCOUNTER — TELEPHONE (OUTPATIENT)
Dept: HEMATOLOGY ONCOLOGY | Facility: CLINIC | Age: 59
End: 2024-01-17

## 2024-01-17 ENCOUNTER — ONCOLOGY SURVIVORSHIP (OUTPATIENT)
Dept: SURGICAL ONCOLOGY | Facility: CLINIC | Age: 59
End: 2024-01-17
Payer: COMMERCIAL

## 2024-01-17 VITALS
HEART RATE: 67 BPM | HEIGHT: 66 IN | RESPIRATION RATE: 18 BRPM | TEMPERATURE: 98.2 F | SYSTOLIC BLOOD PRESSURE: 140 MMHG | WEIGHT: 123.6 LBS | BODY MASS INDEX: 19.86 KG/M2 | OXYGEN SATURATION: 99 % | DIASTOLIC BLOOD PRESSURE: 88 MMHG

## 2024-01-17 DIAGNOSIS — Z12.31 SCREENING MAMMOGRAM FOR BREAST CANCER: ICD-10-CM

## 2024-01-17 DIAGNOSIS — Z85.3 PERSONAL HISTORY OF ADENOCARCINOMA OF BREAST: Primary | ICD-10-CM

## 2024-01-17 DIAGNOSIS — R92.333 HETEROGENEOUSLY DENSE TISSUE OF BOTH BREASTS ON MAMMOGRAPHY: ICD-10-CM

## 2024-01-17 PROCEDURE — 99215 OFFICE O/P EST HI 40 MIN: CPT | Performed by: NURSE PRACTITIONER

## 2024-01-17 NOTE — TELEPHONE ENCOUNTER
I called Deyanira to schedule a new patient appointment with the [facility]etics [facility].      Outcome:   I left a voice message encouraging the patient to call the [facility] at [phone] to schedule this appointment. A [employer] message was also send with our contact information.     Follow-up:   At this time the referral will be closed and we will wait to hear back from the patient regarding scheduling this appointment.

## 2024-01-17 NOTE — PROGRESS NOTES
Assessment/Plan:    Diagnoses and all orders for this visit:    Personal history of adenocarcinoma of breast  -     MRI breast bilateral w and wo contrast w cad; Future  -     Ambulatory referral to oncology social worker; Future  -     Ambulatory Referral to Oncology Genetics; Future    Screening mammogram for breast cancer  -     Mammo screening bilateral w 3d & cad; Future    Heterogeneously dense tissue of both breasts on mammography  -     MRI breast bilateral w and wo contrast w cad; Future    Patient is a 58-year-old female that was diagnosed with a left-sided breast cancer November 2016.  Her pathology revealed invasive ductal carcinoma, ER/%, HER2 negative.  She underwent a left lumpectomy and sentinel node biopsy with Dr. Mcmillan.  Oncotype score was 10.  She completed adjuvant radiation therapy and she took tamoxifen for 5 years.  She is now on observation.  Breast cancer survivorship visit performed today and treatment summary and care plan were reviewed with patient.  She had a bilateral mammogram in January 2024 which was BI-RADS 2, category 3 density.  She had a breast MRI in July 2023 which was BI-RADS 2.  She offers no new complaints today and there are no worrisome findings on exam.  We discussed the option of updating her genetic testing and patient is agreeable, referral placed to oncology genetics.  She is up-to-date on colorectal and cervical cancer screenings.  I will make arrangements for her breast MRI and mammogram.  We will plan to see her back in 1 year for a routine follow-up visit or sooner should the need arise.  She was instructed to contact us with any changes or concerns in the interim.  All of her questions were answered today.    REASON FOR VISIT:   Survivorship      Previous therapy:  Oncology History   Personal history of adenocarcinoma of breast   3/2011 Genetic Testing    [employer] BRCA1, BRCA2  Negative     11/17/2016 Biopsy    Left breast biopsy:  - Invasive ductal  "carcinoma  Grade 1  %  %  HER2 negative     1/3/2017 Surgery    Left lumpectomy and sentinel node biopsy  - clear margins  - 0/3 lymph nodes  Stage IA - pT1c, pN0    Dr. Mcmillan     2017 Genomic Testing    Oncotype DX: 10     2/2017 - 3/2022 Hormone Therapy    Tamoxifen   Dr. Yarbrough     3/13/2017 - 4/24/2017 Radiation    left breast to a dose of 5000 cGy followed by an additional 1000 cGy to the primary site  Dr. Maddox           Patient ID: Deyanira Jacobo is a 58 y.o. female  Presenting today for a breast cancer survivorship visit.  She has not appreciated any changes on self-exam.  She denies persistent headaches, back pain or bone pain, cough or shortness of breath, abdominal pain.  She had a bilateral mammogram in January which was BI-RADS 2, category 3 density.  She had a bilateral breast MRI in July 2023 which was BI-RADS 2.          Review of Systems   Constitutional:  Negative for activity change, appetite change, chills, fatigue, fever and unexpected weight change.   Respiratory:  Negative for cough and shortness of breath.    Cardiovascular:  Negative for chest pain.   Gastrointestinal:  Negative for abdominal pain, constipation, diarrhea, nausea and vomiting.   Musculoskeletal:  Negative for arthralgias, back pain, gait problem and myalgias.   Skin:  Negative for color change and rash.   Neurological:  Negative for dizziness and headaches.   Hematological:  Negative for adenopathy.   Psychiatric/Behavioral:  Negative for agitation and confusion.    All other systems reviewed and are negative.      Objective:    Resp. rate 18, height 5' 6\" (1.676 m), not currently breastfeeding.  Body mass index is 19.69 kg/m².      Physical Exam  Vitals reviewed.   Constitutional:       General: She is not in acute distress.     Appearance: Normal appearance. She is well-developed. She is not diaphoretic.   HENT:      Head: Normocephalic and atraumatic.   Cardiovascular:      Rate and Rhythm: Normal rate and " regular rhythm.      Heart sounds: Normal heart sounds.   Pulmonary:      Effort: Pulmonary effort is normal.      Breath sounds: Normal breath sounds.   Chest:   Breasts:     Right: No swelling, bleeding, inverted nipple, mass, nipple discharge, skin change or tenderness.      Left: Skin change (surgical scars) present. No swelling, bleeding, inverted nipple, mass, nipple discharge or tenderness.   Abdominal:      Palpations: Abdomen is soft. There is no mass.      Tenderness: There is no abdominal tenderness.   Musculoskeletal:         General: Normal range of motion.      Cervical back: Normal range of motion.   Lymphadenopathy:      Upper Body:      Right upper body: No supraclavicular or axillary adenopathy.      Left upper body: No supraclavicular or axillary adenopathy.   Skin:     General: Skin is warm and dry.      Findings: No rash.   Neurological:      Mental Status: She is alert and oriented to person, place, and time.   Psychiatric:         Speech: Speech normal.             Discussed symptoms related to disease recurrence, Yes    Evaluated for late effects related to cancer treatment, Yes, describe: discussed effects of surgery, RT, SERM therapy    Screening current for cervical cancer, Yes, describe: pap 2/2022    Screening current for colon cancer, Yes, describe: colonoscopy 4/2023, repeat in 5 years    Cancer rehabilitation services addressed, Yes, describe: n/a exercises regularly    Screening current for osteoporosis, Yes, describe: n/a    Oncology Treatment Summary reviewed with patient and copy provided, Yes    Referral placed for psychosocial evaluation/screening to oncology social work  Yes    I have spent 45 minutes with Patient  today in which greater than 50% of this time was spent in counseling/coordination of care regarding breast cancer survivorship.

## 2024-01-24 ENCOUNTER — PATIENT OUTREACH (OUTPATIENT)
Dept: CASE MANAGEMENT | Facility: OTHER | Age: 59
End: 2024-01-24

## 2024-01-29 ENCOUNTER — PATIENT OUTREACH (OUTPATIENT)
Dept: CASE MANAGEMENT | Facility: OTHER | Age: 59
End: 2024-01-29

## 2024-01-29 NOTE — PROGRESS NOTES
Biopsychosocial and Barriers Assessment Survivorship     Home/Cell Phone: [phone]  Emergency Contact: [phone]  Marital Status:   Interpretation concerns, speaks another language, preferred language: English  Cultural concerns: none  Ability to read or write: yes    Housin story  Home Setup: few steps to enter  Lives With: spouse and daughter and daughter's boyfriend  Daily Living Activities: independent  Durable Medical Equipment: none  Ambulation: independent    Preferred Pharmacy: [facility]  Medication coverage: yes    Employment: Employed   Status/Location: n/a  Ability to pay bills: yes  POA/LW/AD: none    Additional Comments:  OSW placed outreach TC to pt this morning. OSW introduced self and reason for the call. Pt is a very pleasant woman who is in survivorship. She completed a Dt, where she scored a 3/10. She expressed that at times she experiences anxiety. Overall she is doing well at this time.

## 2024-01-30 ENCOUNTER — TELEPHONE (OUTPATIENT)
Dept: HEMATOLOGY ONCOLOGY | Facility: CLINIC | Age: 59
End: 2024-01-30

## 2024-02-05 ENCOUNTER — OFFICE VISIT (OUTPATIENT)
Dept: HEMATOLOGY ONCOLOGY | Facility: CLINIC | Age: 59
End: 2024-02-05
Payer: COMMERCIAL

## 2024-02-05 VITALS
SYSTOLIC BLOOD PRESSURE: 120 MMHG | DIASTOLIC BLOOD PRESSURE: 74 MMHG | OXYGEN SATURATION: 97 % | WEIGHT: 123 LBS | HEIGHT: 66 IN | HEART RATE: 69 BPM | RESPIRATION RATE: 17 BRPM | TEMPERATURE: 97.5 F | BODY MASS INDEX: 19.77 KG/M2

## 2024-02-05 DIAGNOSIS — Z17.0 MALIGNANT NEOPLASM OF UPPER-OUTER QUADRANT OF LEFT BREAST IN FEMALE, ESTROGEN RECEPTOR POSITIVE: Primary | ICD-10-CM

## 2024-02-05 DIAGNOSIS — C50.412 MALIGNANT NEOPLASM OF UPPER-OUTER QUADRANT OF LEFT BREAST IN FEMALE, ESTROGEN RECEPTOR POSITIVE: Primary | ICD-10-CM

## 2024-02-05 DIAGNOSIS — Z78.0 MENOPAUSE: ICD-10-CM

## 2024-02-05 PROCEDURE — 99214 OFFICE O/P EST MOD 30 MIN: CPT | Performed by: INTERNAL MEDICINE

## 2024-02-05 NOTE — PROGRESS NOTES
HPI: Follow-up visit for hormone receptor positive, HER-2 negative, G1, T1c, 1.4 cm, stage I invasive mammary carcinoma in the left breast..Oncotype score was 10. BRCA test was negative.  Patient had left breast lumpectomy and sentinel lymph node sampling in the January 2017.  She received radiation.  In February 2017 she was started on tamoxifen.  Patient was premenopausal at that time.  We had talked about ovarian suppression or BSO because results are better when combined with tamoxifen or AI. She wanted to stay on tamoxifen and go into menopause  naturally ..  Patient stopped taking tamoxifen in February or March 2022 after taking tamoxifen for 5 years.  She did not want to continue on tamoxifen and she did not want to be made postmenopausal and to have AI.  She did not like tamoxifen but stayed on it for 5 years.  She states tamoxifen was affecting her quality of life and she was having mood swings.  She feels much better now not being on tamoxifen.  She has no symptoms at present and even anxiety is much less.  She is postmenopausal now.  Last menstrual period was 2 years ago and FSH was 104.  Patient prefers to remain under surveillance.  She follows with her breast surgeon for examination and imaging studies and she gets mammography and MRI scan because of dense breasts.               Current Outpatient Medications:   •  ALPRAZolam (XANAX) 0.25 mg tablet, Take 0.25 mg by mouth daily as needed for anxiety, Disp: , Rfl:   •  azelastine (ASTELIN) 0.1 % nasal spray, 1 spray into each nostril 2 (two) times a day Use in each nostril as directed, Disp: 30 mL, Rfl: 11  •  Calcium 500 MG tablet, Take 1 tablet by mouth daily, Disp: , Rfl:   •  COLLAGEN PO, Take 1 tablet by mouth daily, Disp: , Rfl:   •  fexofenadine (ALLEGRA) 180 MG tablet, Take 1 tablet (180 mg total) by mouth daily, Disp: 90 tablet, Rfl: 2  •  fluticasone (FLONASE) 50 mcg/act nasal spray, 2 sprays into each nostril daily for 365 doses, Disp: 48  "mL, Rfl: 4  •  Ibuprofen 200 MG CAPS, Take by mouth as needed, Disp: , Rfl:   •  MILK THISTLE PO, Take 1 tablet by mouth daily, Disp: , Rfl:   •  Multiple Vitamins-Minerals (MULTIVITAMIN ADULT PO), Take 1 tablet by mouth daily, Disp: , Rfl:   •  Probiotic Product (PROBIOTIC-10 PO), Take by mouth in the morning  , Disp: , Rfl:     No Known Allergies    Oncology History   Personal history of adenocarcinoma of breast   3/2011 Genetic Testing    Myriad BRCA1, BRCA2  Negative     11/17/2016 Biopsy    Left breast biopsy:  - Invasive ductal carcinoma  Grade 1  %  %  HER2 negative     1/3/2017 Surgery    Left lumpectomy and sentinel node biopsy  - clear margins  - 0/3 lymph nodes  Stage IA - pT1c, pN0    Dr. Mcmillan     2017 Genomic Testing    Oncotype DX: 10     2/2017 - 3/2022 Hormone Therapy    Tamoxifen   Dr. Yarbrough     3/13/2017 - 4/24/2017 Radiation    left breast to a dose of 5000 cGy followed by an additional 1000 cGy to the primary site  Dr. Maddox         ROS:  02/06/24 Reviewed 12 systems: See symptoms in HPI:   Presently no  neurological, cardiac, pulmonary, GI and  symptoms.  Other Symptoms are in HPI.  No  fever, chills, bleeding, bone pains, skin rash, weight loss, arthritic symptoms, tiredness ,  weakness, numbness, hot flashes  claudication and gait problem. No frequent infections.  Not unusually sensitive to heat or cold. No swelling of the ankles. No swollen glands.  Patient is much less anxious.       /74 (BP Location: Left arm, Patient Position: Sitting, Cuff Size: Adult)   Pulse 69   Temp 97.5 °F (36.4 °C)   Resp 17   Ht 5' 6\" (1.676 m)   Wt 55.8 kg (123 lb)   SpO2 97%   BMI 19.85 kg/m²     Physical Exam:  Patient is alert and oriented.  Patient is not in distress.  Vitals are above.  There is no icterus.  There is no oral thrush.  There is no palpable neck mass.  Lung fields are clear to percussion and auscultation.  Heart rate is regular.  There is no palpable abdominal mass.  " Abdomen is soft and nontender.  There is no ascites.  There is no edema of the ankles.  There is no calf tenderness.  There is no focal neurological deficit.  There is no skin rash, no palpable lymphadenopathy in the neck and axillary areas,  no clubbing.    Patient is much less anxious.  Performance status 0.    IMAGING:  ASSESSMENT/BI-RADS CATEGORY:  Left: 2 - Benign  Right: 1 - Negative  Overall: 2 - Benign     RECOMMENDATION:       - Diagnostic mammogram at the current time for both breasts.     Workstation ID: [id number]   Imaging    MRI breast bilateral w and wo contrast w cad (Order: [id number]) - 7/31/2021        LABS:      Results for orders placed or performed during the hospital encounter of 04/28/23   Tissue Exam   Result Value Ref Range    Case Report       Surgical Pathology Report                         Case: [id number]                                   Authorizing Provider:  Prosper Peguero MD      Collected:           04/28/2023 1057              Ordering Location:     [facility]      Received:            04/28/2023 [address] [place] [address] Endoscopy                                                           Pathologist:           Jaya Ko MD                                                          Specimens:   A) - Polyp, Colorectal, splenic flexure. cold snare                                                 B) - Polyp, Colorectal, sigmoid cold snare                                                 Final Diagnosis       A. Polyp, Colorectal, splenic flexure, cold snare:     - Tubular adenoma.     - No high grade dysplasia or carcinoma identified.     B. Polyp, Colorectal, sigmoid, cold snare:     - Tubular adenoma.     - No high grade dysplasia or carcinoma identified.         Additional Information       All reported additional testing was performed with appropriately reactive controls.  These tests were developed and their performance characteristics  "determined by [facility] or appropriate performing facility, though some tests may be performed on tissues which have not been validated for performance characteristics (such as staining performed on alcohol exposed cell blocks and decalcified tissues).  Results should be interpreted with caution and in the context of the patients’ clinical condition. These tests may not be cleared or approved by the U.S. Food and Drug Administration, though the FDA has determined that such clearance or approval is not necessary. These tests are used for clinical purposes and they should not be regarded as investigational or for research. This laboratory has been approved by CLIA 88, designated as a high-complexity laboratory and is qualified to perform these tests.  .Interpretation performed at [facility], [facility] [address]        Synoptic Checklist          COLON/RECTUM POLYP FORM - GI - All Specimens          :    Adenoma(s)      Gross Description       A. The specimen is received in formalin, labeled with the patient's name and hospital number, and is designated \" splenic flexure polyp\".  The specimen consists of 1 tan soft tissue fragment measuring 1.5 cm.  Entirely submitted. One screened cassette.  B. The specimen is received in formalin, labeled with the patient's name and hospital number, and is designated \" sigmoid polyp\".  The specimen consists of 1 tan soft tissue fragment measuring 0.5 cm.  Entirely submitted. One screened cassette.    Note: The estimated total formalin fixation time based upon information provided by the submitting clinician and the standard processing schedule is under 72 hours.      EZelaya       Labs, Imaging, & Other studies:   All pertinent labs and imaging studies were personally reviewed    Lab Results   Component Value Date    K 4.1 09/13/2023    CL 99 09/13/2023    CO2 29 09/13/2023    BUN 12 09/13/2023    CREATININE 0.73 09/13/2023    GLUF 73 " 09/13/2023    CALCIUM 9.9 09/13/2023    AST 28 09/13/2023    ALT 23 09/13/2023    ALKPHOS 56 09/13/2023    EGFR 92 09/13/2023     Lab Results   Component Value Date    WBC 5.14 09/13/2023    HGB 13.4 09/13/2023    HCT 42.0 09/13/2023    MCV 93 09/13/2023     09/13/2023     Normal differential.  Normal CA 27-29.    Reviewed test results and discussed with patient.    Assessment and plan:  Follow-up visit for hormone receptor positive, HER-2 negative, G1, T1c, 1.4 cm, stage I invasive mammary carcinoma in the left breast..Oncotype score was 10. BRCA test was negative.  Patient had left breast lumpectomy and sentinel lymph node sampling in the January 2017.  She received radiation.  In February 2017 she was started on tamoxifen.  Patient was premenopausal at that time.  We had talked about ovarian suppression or BSO because results are better when combined with tamoxifen or AI. She wanted to stay on tamoxifen and go into menopause  naturally ..  Patient stopped taking tamoxifen in February or March 2022 after taking tamoxifen for 5 years.  She did not want to continue on tamoxifen and she did not want to be made postmenopausal and to have AI.  She did not like tamoxifen but stayed on it for 5 years.  She states tamoxifen was affecting her quality of life and she was having mood swings.  She feels much better now not being on tamoxifen.  She has no symptoms at present and even anxiety is much less.  She is postmenopausal now.  Last menstrual period was 2 years ago and FSH was 104.  Patient prefers to remain under surveillance.  She follows with her breast surgeon for examination and imaging studies and she gets mammography and MRI scan because of dense breasts.  .    Physical examination and test results are as recorded and discussed She remains in remission from breast cancer.  Plan is surveillance as above.  She goes to her breast surgeon for examination of breasts and imaging studies. .  She goes to her  gynecologist.  She states she is up-to-date with her medical checkups     Discussed the importance of self-breast examination, eating healthy foods, staying active and health screening tests.  Patient is capable of self-care.  Discussed precautions against coronavirus and flu .   .  She will continue to follow with her primary physician and other consultants.  See diagnoses, orders instructions    1. Malignant neoplasm of upper-outer quadrant of left breast in female, estrogen receptor positive     - CBC and differential; Future  - Comprehensive metabolic panel; Future  - Cancer antigen 27.29; Future      2. Menopause    Addendum: Canceled FSH, LH and estradiol level because FSH was checked last year.  Blood work prior to next visit in 1 year    Patient voiced understanding and agreed  I used dictation device to dictate this note and there could be mistakes in my note.    Counseling / Coordination of Care  .  Provided counseling and support

## 2024-02-20 PROBLEM — Z85.3 HISTORY OF LEFT BREAST CANCER: Status: ACTIVE | Noted: 2024-02-20

## 2024-02-20 NOTE — PROGRESS NOTES
Assessment/Plan:  NGE                                                                                               Uterine prolapse, rectocele with difficulty eliminating stool, cystocele- 2 courses of pelvic physical therapy.  Recommended Colace daily '22 [she took MOM 3x/wk]. BM's are daily.  Does Kegel exercises nightly.  Not sure if this is a functional or mechanical [rectocele] issue.  Explained splinting the posterior vaginal wall with her fingers to see if the stool is bulging into the vagina.  Also recommended that she reach out to the person who performed the colonoscopy for any recommendations.  L Breast Ca- fu w Dr. Mcmillan   Dx'c M 9/21- Dense Breasts, T-C 20.53 %- MRI nl 7/23  Co- Testing q 5yrs. -'28                                                                                RTO 1 yr.                                                                                    SBA monthly  3 D Mammography - screening 1/24, MRI 7/24 - active order  Colonoscopy  '18, 2/23 - to ask about bowel habits and pressure  Exercise 3/wk  -   compliant              Calcium 1,000 mg/d with Vit D - compliant     Depression Screen: Neg       Diagnoses and all orders for this visit:    Encounter for annual routine gynecological examination    First degree uterine prolapse    Cystocele, midline    Rectocele    Submucous leiomyoma of uterus    History of left breast cancer    Heterogeneously dense tissue of both breasts on mammography    Family history of breast cancer in sister              Subjective:        Patient ID: Deyanira Jacobo is a 58 y.o. female.    Deyanira returns for an annual visit she has no gynecological complaints.  She still has issues with stool elimination.  She has a few bowel movements daily.  They are small volumes and she feels as if there is incomplete elimination.  She performs Kegel exercises regularly. She was unable to have a conversation about this at the time of her colonoscopy.  She did not see the  physician until the morning of the procedure.    She is unsure when her last period was but it was more than a year ago.  She remains sexually active without any problems.  She continues to perform Kegel exercises.    She is now being seen at Dr. Mcmillan's office annually instead of twice a year.        The following portions of the patient's history were reviewed and updated as appropriate: She  has a past medical history of Abdominal pain, left lower quadrant, Anxiety, BRCA negative, Breast cancer (HCC), Common cold, Environmental and seasonal allergies, Exercises 5 to 6 times per week, Food allergy, Low back pain, PONV (postoperative nausea and vomiting), Seasonal allergies, Use of tamoxifen (Nolvadex), Vaginal discharge, Vaginal irritation, Vulvar burning, and Vulvar irritation.  Patient Active Problem List    Diagnosis Date Noted    History of left breast cancer 02/20/2024    Screening mammogram for breast cancer 01/16/2023    Encounter for follow-up surveillance of breast cancer 07/20/2022    PONV (postoperative nausea and vomiting) 04/22/2022    Anxiety 04/22/2022    Dermatochalasis of both eyelids 04/15/2022    Encounter for annual routine gynecological examination 02/11/2022    First degree uterine prolapse 02/11/2022    Rectocele 02/11/2022    Constipation 02/11/2022    Cystocele, midline 02/11/2022    Submucous leiomyoma of uterus 02/11/2022    Family history of breast cancer in sister 02/11/2022    Chronic seasonal allergic rhinitis due to pollen 11/30/2021    Allergic rhinitis due to dogs 11/30/2021    Menopause 03/01/2021    Abnormal uterine bleeding (AUB) 05/01/2020    Abdominal bloating 06/03/2019    Pelvic floor relaxation 01/21/2019    Heartburn 01/07/2019    Coccyx pain 07/02/2018    Varicose veins of right lower extremity with pain 06/19/2018    Dense breast tissue 06/04/2018    Encntr for gyn exam (general) (routine) w abnormal findings 05/29/2018    Vaginal relaxation 05/29/2018    Routine  screening for STI (sexually transmitted infection) 2018    Vaginal discharge 2018    Right lower quadrant pain 2018    Rectal pain 2018    External hemorrhoid 2018    Narrowing of stools 2018    Personal history of adenocarcinoma of breast 2017   PMH:   2 para 2;  x 2; term  FH breast cancer- Sr.  age 41      BRCA negative      Menorrhagia '      L Breast Ca  left lumpectomy and radiation      Anxiety      Carpal Tunnel       Pelvic floor relaxation- - referred for physical therapy, only went to 2 visits.      Blepharoplasty-       Menopause '  She  has a past surgical history that includes Appendectomy; pr bx/exc lymph node open superficial (Left, 1/3/2017); Colonoscopy; [place] lymph node biopsy; US guided breast biopsy left complete (Left, 2016); Mammo needle localization left (all inc) (Left, 1/3/2017); Breast biopsy (Left, 2016); pr mastectomy partial (Left, 1/3/2017); pr blepharoplasty upper eyelid w/excessive skin (Bilateral, 2022); and pr blepharoplasty lower eyelid (Bilateral, 2022).  Her family history includes Breast cancer in her paternal aunt and sister; Lung cancer in her father and mother; No Known Problems in her brother, daughter, daughter, maternal aunt, maternal aunt, maternal grandfather, maternal grandmother, paternal grandfather, and paternal grandmother; Other in her half-brother.  FH:  S - Breast Ca 41  PA - Breast Ca  She  reports that she has been smoking cigarettes. She has never used smokeless tobacco. She reports current alcohol use of about 1.0 standard drink of alcohol per week. She reports that she does not use drugs.  SH:    Lives in [place].  She has been working from home since the pandemic. Oldest daughter - Radha. I delivered Miranda.  She is working in [employer].  Her mother-in-law  '21.  Current Outpatient Medications   Medication Sig Dispense Refill    ALPRAZolam (XANAX)  0.25 mg tablet Take 0.25 mg by mouth daily as needed for anxiety      azelastine (ASTELIN) 0.1 % nasal spray 1 spray into each nostril 2 (two) times a day Use in each nostril as directed 30 mL 11    Calcium 500 MG tablet Take 1 tablet by mouth daily      COLLAGEN PO Take 1 tablet by mouth daily      fexofenadine (ALLEGRA) 180 MG tablet Take 1 tablet (180 mg total) by mouth daily 90 tablet 2    fluticasone (FLONASE) 50 mcg/act nasal spray 2 sprays into each nostril daily for 365 doses 48 mL 4    Ibuprofen 200 MG CAPS Take by mouth as needed      MILK THISTLE PO Take 1 tablet by mouth daily      Multiple Vitamins-Minerals (MULTIVITAMIN ADULT PO) Take 1 tablet by mouth daily      Probiotic Product (PROBIOTIC-10 PO) Take by mouth in the morning         No current facility-administered medications for this visit.     Current Outpatient Medications on File Prior to Visit   Medication Sig    ALPRAZolam (XANAX) 0.25 mg tablet Take 0.25 mg by mouth daily as needed for anxiety    azelastine (ASTELIN) 0.1 % nasal spray 1 spray into each nostril 2 (two) times a day Use in each nostril as directed    Calcium 500 MG tablet Take 1 tablet by mouth daily    COLLAGEN PO Take 1 tablet by mouth daily    fexofenadine (ALLEGRA) 180 MG tablet Take 1 tablet (180 mg total) by mouth daily    fluticasone (FLONASE) 50 mcg/act nasal spray 2 sprays into each nostril daily for 365 doses    Ibuprofen 200 MG CAPS Take by mouth as needed    MILK THISTLE PO Take 1 tablet by mouth daily    Multiple Vitamins-Minerals (MULTIVITAMIN ADULT PO) Take 1 tablet by mouth daily    Probiotic Product (PROBIOTIC-10 PO) Take by mouth in the morning       No current facility-administered medications on file prior to visit.     She has No Known Allergies..    Review of Systems   Constitutional:  Negative for activity change, appetite change, fatigue and unexpected weight change.   Eyes:  Negative for visual disturbance.   Respiratory:  Negative for cough, chest  "tightness, shortness of breath and wheezing.    Cardiovascular:  Negative for chest pain, palpitations and leg swelling.        Breast: Patient denies tenderness, nipple discharge, masses, or erythema.   Gastrointestinal:  Negative for abdominal distention, abdominal pain, blood in stool, constipation, diarrhea, nausea and vomiting.   Genitourinary:  Negative for decreased urine volume, difficulty urinating, dyspareunia, dysuria, frequency, hematuria, menstrual problem, pelvic pain, urgency, vaginal bleeding, vaginal discharge and vaginal pain.        No incontinence.  Marquand once a month.  No rectal urgency, excessive flatus, or incontinence.  Occasionally strains.  She notes rectal pressure if she has not had a bowel movement   Musculoskeletal:  Negative for arthralgias.   Skin:  Negative for rash.   Neurological:  Negative for weakness, light-headedness, numbness and headaches.   Hematological:  Does not bruise/bleed easily.   Psychiatric/Behavioral:  Negative for agitation, behavioral problems and sleep disturbance. The patient is not nervous/anxious.          Objective:    Vitals:    02/21/24 0902   BP: 116/66   BP Location: Left arm   Patient Position: Sitting   Cuff Size: Standard   Weight: 55 kg (121 lb 3.2 oz)   Height: 5' 5\" (1.651 m)            Physical Exam  Vitals and nursing note reviewed. Exam conducted with a chaperone present.   Constitutional:       Appearance: Normal appearance. She is well-developed.   HENT:      Head: Normocephalic and atraumatic.   Eyes:      General: No scleral icterus.        Right eye: No discharge.         Left eye: No discharge.      Extraocular Movements: Extraocular movements intact.      Conjunctiva/sclera: Conjunctivae normal.   Neck:      Thyroid: No thyromegaly.      Trachea: No tracheal deviation.   Cardiovascular:      Rate and Rhythm: Normal rate and regular rhythm.      Heart sounds: Normal heart sounds. No murmur heard.  Pulmonary:      Effort: Pulmonary " effort is normal. No respiratory distress.      Breath sounds: Normal breath sounds. No wheezing.   Chest:   Breasts:     Breasts are symmetrical.      Right: No inverted nipple, mass, nipple discharge, skin change or tenderness.      Left: No inverted nipple, mass, nipple discharge, skin change or tenderness.   Abdominal:      General: Bowel sounds are normal. There is no distension.      Palpations: Abdomen is soft. There is no mass.      Tenderness: There is no abdominal tenderness. There is no guarding or rebound.   Genitourinary:     General: Normal vulva.      Exam position: Supine.      Labia:         Right: No rash, tenderness or lesion.         Left: No rash, tenderness or lesion.       Vagina: Normal.      Cervix: No cervical motion tenderness or discharge.      Uterus: Not deviated, not enlarged and not tender.       Adnexa:         Right: No mass, tenderness or fullness.          Left: No mass, tenderness or fullness.        Rectum: No external hemorrhoid.      Comments: Urethral meatus within normal limits.  Perineum within normal limits.  Mild vaginal atrophy, Minimal cystocele, first-degree rectocele.  Previously seen uterine prolapse is not evident today.  The uterus is anteverted and small.  There is no suggestion of a fibroid.  The relaxation is stable  Musculoskeletal:         General: Normal range of motion.      Cervical back: Normal range of motion and neck supple.   Lymphadenopathy:      Upper Body:      Right upper body: No supraclavicular, axillary or pectoral adenopathy.      Left upper body: No supraclavicular, axillary or pectoral adenopathy.   Skin:     General: Skin is warm and dry.   Neurological:      Mental Status: She is alert and oriented to person, place, and time.   Psychiatric:         Mood and Affect: Mood normal.         Behavior: Behavior normal.         Thought Content: Thought content normal.         Judgment: Judgment normal.

## 2024-02-21 ENCOUNTER — ANNUAL EXAM (OUTPATIENT)
Dept: OBGYN CLINIC | Facility: CLINIC | Age: 59
End: 2024-02-21
Payer: COMMERCIAL

## 2024-02-21 VITALS
HEIGHT: 65 IN | DIASTOLIC BLOOD PRESSURE: 66 MMHG | BODY MASS INDEX: 20.19 KG/M2 | WEIGHT: 121.2 LBS | SYSTOLIC BLOOD PRESSURE: 116 MMHG

## 2024-02-21 DIAGNOSIS — D25.0 SUBMUCOUS LEIOMYOMA OF UTERUS: ICD-10-CM

## 2024-02-21 DIAGNOSIS — Z01.419 ENCOUNTER FOR ANNUAL ROUTINE GYNECOLOGICAL EXAMINATION: Primary | ICD-10-CM

## 2024-02-21 DIAGNOSIS — Z85.3 HISTORY OF LEFT BREAST CANCER: ICD-10-CM

## 2024-02-21 DIAGNOSIS — N81.6 RECTOCELE: ICD-10-CM

## 2024-02-21 DIAGNOSIS — Z80.3 FAMILY HISTORY OF BREAST CANCER IN SISTER: ICD-10-CM

## 2024-02-21 DIAGNOSIS — N81.11 CYSTOCELE, MIDLINE: ICD-10-CM

## 2024-02-21 DIAGNOSIS — N81.2 FIRST DEGREE UTERINE PROLAPSE: ICD-10-CM

## 2024-02-21 DIAGNOSIS — R92.333 HETEROGENEOUSLY DENSE TISSUE OF BOTH BREASTS ON MAMMOGRAPHY: ICD-10-CM

## 2024-02-21 PROBLEM — Z11.3 ROUTINE SCREENING FOR STI (SEXUALLY TRANSMITTED INFECTION): Status: RESOLVED | Noted: 2018-05-29 | Resolved: 2024-02-21

## 2024-02-21 PROCEDURE — S0612 ANNUAL GYNECOLOGICAL EXAMINA: HCPCS | Performed by: OBSTETRICS & GYNECOLOGY

## 2024-04-13 PROBLEM — N95.9 PREMENOPAUSAL PATIENT: Status: ACTIVE | Noted: 2017-01-26

## 2024-04-13 PROBLEM — J30.89 ENVIRONMENTAL AND SEASONAL ALLERGIES: Status: ACTIVE | Noted: 2017-05-15

## 2024-04-13 PROBLEM — F41.9 ANXIETY: Status: ACTIVE | Noted: 2019-07-15

## 2024-09-29 ENCOUNTER — HOSPITAL ENCOUNTER (OUTPATIENT)
Dept: RADIOLOGY | Facility: HOSPITAL | Age: 59
Discharge: HOME | End: 2024-09-29
Payer: COMMERCIAL

## 2024-09-29 DIAGNOSIS — R92.333 HETEROGENEOUSLY DENSE TISSUE OF BOTH BREASTS ON MAMMOGRAPHY: ICD-10-CM

## 2024-09-29 DIAGNOSIS — Z85.3 PERSONAL HISTORY OF ADENOCARCINOMA OF BREAST: ICD-10-CM

## 2024-09-29 PROCEDURE — A9585 GADOBUTROL INJECTION: HCPCS | Performed by: NURSE PRACTITIONER

## 2024-09-29 PROCEDURE — 77049 MRI BREAST C-+ W/CAD BI: CPT

## 2024-09-29 PROCEDURE — C8908 MRI W/O FOL W/CONT, BREAST,: HCPCS

## 2024-09-29 PROCEDURE — G1004 CDSM NDSC: HCPCS

## 2024-09-29 RX ORDER — GADOBUTROL 604.72 MG/ML
5 INJECTION INTRAVENOUS
Status: COMPLETED | OUTPATIENT
Start: 2024-09-29 | End: 2024-09-29

## 2024-09-29 RX ADMIN — GADOBUTROL 5 ML: 604.72 INJECTION INTRAVENOUS at 14:38

## 2025-01-16 ENCOUNTER — TELEPHONE (OUTPATIENT)
Dept: HEMATOLOGY ONCOLOGY | Facility: CLINIC | Age: 60
End: 2025-01-16

## 2025-01-16 NOTE — TELEPHONE ENCOUNTER
Patient called the RX Refill Line. Message is being forwarded to the office.     Patient is requesting printout of blood work and mailed out .patient new insurance needs to use [facility] for blood work now     Any questions Please contact patient at [phone]

## 2025-01-21 ENCOUNTER — OFFICE VISIT (OUTPATIENT)
Dept: OBGYN CLINIC | Facility: CLINIC | Age: 60
End: 2025-01-21
Payer: COMMERCIAL

## 2025-01-21 VITALS
BODY MASS INDEX: 21.33 KG/M2 | WEIGHT: 128 LBS | HEIGHT: 65 IN | DIASTOLIC BLOOD PRESSURE: 86 MMHG | SYSTOLIC BLOOD PRESSURE: 120 MMHG

## 2025-01-21 DIAGNOSIS — N81.6 RECTOCELE: Primary | ICD-10-CM

## 2025-01-21 DIAGNOSIS — R10.2 PELVIC PRESSURE IN FEMALE: ICD-10-CM

## 2025-01-21 PROCEDURE — 99213 OFFICE O/P EST LOW 20 MIN: CPT | Performed by: OBSTETRICS & GYNECOLOGY

## 2025-01-21 NOTE — ASSESSMENT & PLAN NOTE
Suspect that this is related mainly to cystocele and rectocele.  Rectocele > cystocele  She has tried conservative treatment with PFPT, now interested in surgical options given progression of symptoms  Referral placed to urogynecology  Encouraged good bowel regimen and incorporation of miralax daily

## 2025-01-21 NOTE — PROGRESS NOTES
Name: Deyanira Jacobo      : 1965      MRN: [id number]  Encounter Provider: Linda Brown DO  Encounter Date: 2025   Encounter department: [facility] [place]  :  Assessment & Plan  Rectocele    Orders:    Ambulatory Referral to Urogynecology; Future    Pelvic pressure in female  Suspect that this is related mainly to cystocele and rectocele.  Rectocele > cystocele  She has tried conservative treatment with PFPT, now interested in surgical options given progression of symptoms  Referral placed to urogynecology  Encouraged good bowel regimen and incorporation of miralax daily              History of Present Illness     HPI  Deyanira Jacobo is a 59 y.o.  who presents today to discuss pelvic pressure.     Has been dealing with prolapse.   - has cystocele, rectocele, and uterine prolapse    Has tried kegels and went to pelvic floor PT.   Initially helped but now continues to have significant pelvic pressure and bulging.  She feels the bulge is worsening.     She also has issues with defecation. She does struggle with constipation.   Recently had colonoscopy- 2023- 2 benign polyps, 5yr recall  Has tried v8 juice and fibrous foods.   Has not tried miralax, though this has been recommended to her.   She feels a lot of abdominal discomfort with bloating and distention.     History obtained from: patient    Review of Systems  Negative unless otherwise noted in HPI.     Past Medical History   Past Medical History:   Diagnosis Date    Abdominal pain, left lower quadrant     Anxiety     BRCA negative     Breast cancer (HCC)     Left    Common cold     Environmental and seasonal allergies     Exercises 5 to 6 times per week     Food allergy     scallops    Low back pain     PONV (postoperative nausea and vomiting)     Seasonal allergies     Use of tamoxifen (Nolvadex)     Vaginal discharge     Vaginal irritation     Vulvar burning     Vulvar irritation      Past  Surgical History:   Procedure Laterality Date    APPENDECTOMY      BREAST BIOPSY Left 2016    U/S BX    COLONOSCOPY      MAMMO NEEDLE LOCALIZATION LEFT (ALL INC) Left 1/3/2017    TX BLEPHAROPLASTY LOWER EYELID Bilateral 2022    Procedure: BLEPHAROPLASTY LOWER;  Surgeon: Mina Granda MD;  Location:  MAIN OR;  Service: Plastics    TX BLEPHAROPLASTY UPPER EYELID W/EXCESSIVE SKIN Bilateral 2022    Procedure: BLEPHAROPLASTY UPPER;  Surgeon: Mina Granda MD;  Location:  MAIN OR;  Service: Plastics    TX BX/EXC LYMPH NODE OPEN SUPERFICIAL Left 1/3/2017    Procedure: BIOPSY LYMPH NODE SENTINEL @ 1030 LYMPHOSCINTIGRAPHY,LYMPHATIC MAPPING;  Surgeon: Payton Mcmillan MD;  Location: [facility];  Service: Surgical Oncology    TX MASTECTOMY PARTIAL Left 1/3/2017    Procedure: LUMPECTOMY BREAST NEEDLE LOCALIZED @ 0930;  Surgeon: Payton Mcmillan MD;  Location: [facility];  Service: Surgical Oncology    SENTINEL LYMPH NODE BIOPSY      US GUIDED BREAST BIOPSY LEFT COMPLETE Left 2016     Family History   Problem Relation Age of Onset    Lung cancer Mother         mother age 60    Lung cancer Father         father age 78    Breast cancer Sister         age dx unk    No Known Problems Brother     No Known Problems Maternal Grandmother     No Known Problems Maternal Grandfather     No Known Problems Paternal Grandmother     No Known Problems Paternal Grandfather     No Known Problems Daughter     No Known Problems Daughter     No Known Problems Maternal Aunt     No Known Problems Maternal Aunt     Breast cancer Paternal Aunt         aunt 48    Other Half-Brother         Born no spleen -  if blood infection    Colon cancer Neg Hx       reports that she has been smoking cigarettes. She has never used smokeless tobacco. She reports current alcohol use of about 1.0 standard drink of alcohol per week. She reports that she does not use drugs.  Current Outpatient Medications on File Prior to Visit   Medication Sig  "Dispense Refill    ALPRAZolam (XANAX) 0.25 mg tablet Take 0.25 mg by mouth daily as needed for anxiety      azelastine (ASTELIN) 0.1 % nasal spray 1 spray into each nostril 2 (two) times a day Use in each nostril as directed 90 mL 3    Calcium 500 MG tablet Take 1 tablet by mouth daily      COLLAGEN PO Take 1 tablet by mouth daily      fexofenadine (ALLEGRA) 180 MG tablet Take 1 tablet (180 mg total) by mouth daily 90 tablet 2    fluticasone (FLONASE) 50 mcg/act nasal spray 2 sprays into each nostril daily for 365 doses 48 mL 4    Ibuprofen 200 MG CAPS Take by mouth as needed      MILK THISTLE PO Take 1 tablet by mouth daily      Multiple Vitamins-Minerals (MULTIVITAMIN ADULT PO) Take 1 tablet by mouth daily      polyethylene glycol (MiraLax) 17 GM/SCOOP powder Take 17 g by mouth 2 (two) times a day      Probiotic Product (PROBIOTIC-10 PO) Take by mouth in the morning         No current facility-administered medications on file prior to visit.   No Known Allergies      Objective   /86 (BP Location: Left arm, Patient Position: Sitting, Cuff Size: Standard)   Ht 5' 5\" (1.651 m)   Wt 58.1 kg (128 lb)   LMP  (LMP Unknown)   BMI 21.30 kg/m²      Physical Exam  Constitutional:       General: She is not in acute distress.  HENT:      Head: Normocephalic and atraumatic.      Mouth/Throat:      Mouth: Mucous membranes are moist.   Cardiovascular:      Rate and Rhythm: Normal rate.   Pulmonary:      Effort: Pulmonary effort is normal.   Abdominal:      General: There is no distension.   Genitourinary:     Labia:         Right: No tenderness or lesion.         Left: No tenderness or lesion.       Vagina: No vaginal discharge.      Cervix: No friability or lesion.      Uterus: Not enlarged and not tender.       Comments: Stage 2 rectocele  Stage 1 cystocele  Stage 1 uterine prolapse  Skin:     General: Skin is warm and dry.   Neurological:      Mental Status: She is alert.   Psychiatric:         Mood and Affect: Mood " normal.         Behavior: Behavior normal.

## 2025-01-22 ENCOUNTER — HOSPITAL ENCOUNTER (OUTPATIENT)
Dept: RADIOLOGY | Age: 60
Discharge: HOME | End: 2025-01-22
Payer: COMMERCIAL

## 2025-01-22 DIAGNOSIS — Z12.31 SCREENING MAMMOGRAM FOR BREAST CANCER: ICD-10-CM

## 2025-01-22 PROCEDURE — 77063 BREAST TOMOSYNTHESIS BI: CPT

## 2025-01-22 PROCEDURE — 77067 SCR MAMMO BI INCL CAD: CPT

## 2025-01-31 ENCOUNTER — TELEPHONE (OUTPATIENT)
Age: 60
End: 2025-01-31

## 2025-01-31 NOTE — TELEPHONE ENCOUNTER
A refill request was received for:  Requested Prescriptions     Pending Prescriptions Disp Refills   • LOSARTAN POTASSIUM 25 MG Oral Tab [Pharmacy Med Name: LOSARTAN 25MG TABLETS] 90 tablet 1     Sig: TAKE 1 TABLET(25 MG) BY MOUTH DAILY     Last refill day Patient would like a call back to discuss the results of the mammogram she just had done, she is going to schedule the diagnostic mammo + us, but stated she would still like a call as she is very anxious after receiving the results. Please call her at [phone]

## 2025-02-03 ENCOUNTER — RESULTS FOLLOW-UP (OUTPATIENT)
Dept: SURGICAL ONCOLOGY | Facility: CLINIC | Age: 60
End: 2025-02-03

## 2025-02-03 NOTE — TELEPHONE ENCOUNTER
Spoke with patient and answered all questions.    She is currently scheduled with Marguerite next week but diagnostic imaging is 3/13. I r/s her appt with Marguerite to 3/19 at 8:30. Please make this change in epic. Thank you!

## 2025-02-18 LAB
BASOPHILS # BLD AUTO: 52 CELLS/UL (ref 0–200)
BASOPHILS NFR BLD AUTO: 1.1 %
CANCER AG27-29 SERPL-ACNC: 18 U/ML
EOSINOPHIL # BLD AUTO: 150 CELLS/UL (ref 15–500)
EOSINOPHIL NFR BLD AUTO: 3.2 %
ERYTHROCYTE [DISTWIDTH] IN BLOOD BY AUTOMATED COUNT: 12.5 % (ref 11–15)
HCT VFR BLD AUTO: 40.2 % (ref 35–45)
HGB BLD-MCNC: 13.4 G/DL (ref 11.7–15.5)
LYMPHOCYTES # BLD AUTO: 1673 CELLS/UL (ref 850–3900)
LYMPHOCYTES NFR BLD AUTO: 35.6 %
MCH RBC QN AUTO: 30.1 PG (ref 27–33)
MCHC RBC AUTO-ENTMCNC: 33.3 G/DL (ref 32–36)
MCV RBC AUTO: 90.3 FL (ref 80–100)
MONOCYTES # BLD AUTO: 620 CELLS/UL (ref 200–950)
MONOCYTES NFR BLD AUTO: 13.2 %
NEUTROPHILS # BLD AUTO: 2204 CELLS/UL (ref 1500–7800)
NEUTROPHILS NFR BLD AUTO: 46.9 %
PLATELET # BLD AUTO: 247 THOUSAND/UL (ref 140–400)
PMV BLD REES-ECKER: 12.6 FL (ref 7.5–12.5)
RBC # BLD AUTO: 4.45 MILLION/UL (ref 3.8–5.1)
WBC # BLD AUTO: 4.7 THOUSAND/UL (ref 3.8–10.8)

## 2025-02-20 ENCOUNTER — TELEPHONE (OUTPATIENT)
Dept: HEMATOLOGY ONCOLOGY | Facility: CLINIC | Age: 60
End: 2025-02-20

## 2025-02-20 NOTE — TELEPHONE ENCOUNTER
Called patient in regards to an appointment change. Left a voicemail that if April 25, 2025 @ 4 pm is new appointment if this date and time doesn't work please call our office back at [phone].      Thank you

## 2025-02-24 ENCOUNTER — TELEPHONE (OUTPATIENT)
Age: 60
End: 2025-02-24

## 2025-02-24 NOTE — TELEPHONE ENCOUNTER
Patient calling in, needs the lab order for the CMP ordered by Dr Yarbrough mailed to her address, I confirmed her address with her, it is the one listed in the chart.

## 2025-03-03 ENCOUNTER — ANNUAL EXAM (OUTPATIENT)
Dept: OBGYN CLINIC | Facility: CLINIC | Age: 60
End: 2025-03-03
Payer: COMMERCIAL

## 2025-03-03 VITALS
DIASTOLIC BLOOD PRESSURE: 84 MMHG | HEIGHT: 67 IN | SYSTOLIC BLOOD PRESSURE: 122 MMHG | BODY MASS INDEX: 20.28 KG/M2 | WEIGHT: 129.2 LBS

## 2025-03-03 DIAGNOSIS — N81.6 RECTOCELE: ICD-10-CM

## 2025-03-03 DIAGNOSIS — N81.2 FIRST DEGREE UTERINE PROLAPSE: ICD-10-CM

## 2025-03-03 DIAGNOSIS — Z01.411 ENCNTR FOR GYN EXAM (GENERAL) (ROUTINE) W ABNORMAL FINDINGS: Primary | ICD-10-CM

## 2025-03-03 DIAGNOSIS — N81.11 CYSTOCELE, MIDLINE: ICD-10-CM

## 2025-03-03 PROCEDURE — S0612 ANNUAL GYNECOLOGICAL EXAMINA: HCPCS | Performed by: NURSE PRACTITIONER

## 2025-03-03 NOTE — PROGRESS NOTES
Assessment/Plan:  Calcium 3139-3903 mg (in divided doses-max 600 mg at one time) + 600-1000 IU Vit D daily.   Exercise 150-300 minutes per week minimum including weight bearing exercises.   Pap with high risk HPV Q 5 years, if normal.  Due   Call your insurance company to verify coverage prior to completing any ordered tests.   Right breast diagnostic mammogram and ultrasound scheduled.   Annual mammogram ordered and monthly breast self exam recommended.    Colonoscopy- Due          Kegels 20 times twice daily.   Silicone based lubricant with sex. (Use water based lubricant with condoms or sexual toys.)    Vaginal moisturizers twice weekly as needed.   Return to office in one year or sooner, if needed.        1. Encntr for gyn exam (general) (routine) w abnormal findings  2. Rectocele  3. Cystocele, midline  4. First degree uterine prolapse             Subjective:      Patient ID: Deyanira Jacobo is a 59 y.o. female.    HPI    Deyanira Jacobo is a 59 y.o.  (  29 yo Radha-lives at home with fiancee  25 yo Miranda-lives in [place] ) female who is here today for her annual visit. No gynecologic health concerns.   Last in office on 25 with Dr Chawla for uterine prolapse cystocele and rectocele. Referred to urogynecology. She followed with Dr Hays for care. She considered surgical repair but cancelled as her symptoms have improved with some dietary changes. He recommended osphena, but she does not want to start this medication.   Menopausal with no vaginal bleeding or vaginal dryness.   Exercise- 5 days per week.   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   Smoking-socially. About 3 cigarettes per week. No plan to quit.     Deyanira Jacobo is sexually active with male partner/  of 29 years. Monogamous and feels safe in this relationship. Denies vaginal pain,bleeding or dryness.    Vaginal pressure has lessened.   She is not interested in STD screening today.   She denies vaginal discharge, itching or pelvic  pain.   She has no urinary concerns, does not have incontinence.  No bowel concerns.  No breast concerns.     Last pap:   5/1/17 normal with negative HR HPV   02/14/2022 normal with negative HR HPV   9/29/24 Breast MRI-normal  Mammogram: Hx of left breast cancer 1/17, lumpectomy and radiation.   01/22/2025 right breast asymmetry. Diagnostic mammogram and ultrasound of right breast recommended.   Follows with surg/onc.  Colonoscopy: 04/28/2023 recall 5 years  DEXA scan:N/A      Family history of cancer:   Cancer-related family history includes Breast cancer in her paternal aunt and sister; Lung cancer in her father and mother. There is no history of Colon cancer.      The following portions of the patient's history were reviewed and updated as appropriate: allergies, current medications, past family history, past medical history, past social history, past surgical history, and problem list.    Review of Systems   Constitutional: Negative.  Negative for activity change, appetite change, chills, diaphoresis, fatigue, fever and unexpected weight change.   HENT:  Negative for congestion, dental problem, sneezing, sore throat and trouble swallowing.    Eyes:  Negative for visual disturbance.   Respiratory:  Negative for chest tightness and shortness of breath.    Cardiovascular:  Negative for chest pain and leg swelling.   Gastrointestinal:  Negative for abdominal pain, constipation, diarrhea, nausea and vomiting.   Genitourinary:  Negative for difficulty urinating, dyspareunia, dysuria, frequency, hematuria, menstrual problem, pelvic pain, urgency, vaginal bleeding, vaginal discharge and vaginal pain.   Musculoskeletal:  Negative for back pain and neck pain.   Skin: Negative.    Allergic/Immunologic: Negative.    Neurological:  Negative for weakness and headaches.   Hematological:  Negative for adenopathy.   Psychiatric/Behavioral: Negative.           Objective:      /84 (BP Location: Left arm, Patient Position:  "Sitting, Cuff Size: Standard)   Ht 5' 7\" (1.702 m)   Wt 58.6 kg (129 lb 3.2 oz)   LMP  (LMP Unknown)   BMI 20.24 kg/m²          Physical Exam  Vitals and nursing note reviewed.   Constitutional:       Appearance: Normal appearance. She is well-developed.   HENT:      Head: Normocephalic and atraumatic.   Eyes:      General:         Right eye: No discharge.         Left eye: No discharge.   Neck:      Thyroid: No thyromegaly.      Trachea: Trachea normal.   Cardiovascular:      Rate and Rhythm: Normal rate and regular rhythm.      Heart sounds: Normal heart sounds.   Pulmonary:      Effort: Pulmonary effort is normal.      Breath sounds: Normal breath sounds.   Chest:   Breasts:     Breasts are symmetrical.      Right: Normal. No inverted nipple, mass, nipple discharge, skin change or tenderness.      Left: Normal. No inverted nipple, mass, nipple discharge, skin change or tenderness.   Abdominal:      Palpations: Abdomen is soft.   Genitourinary:     General: Normal vulva.      Exam position: Lithotomy position.      Labia:         Right: No rash, tenderness, lesion or injury.         Left: No rash, tenderness, lesion or injury.       Urethra: No prolapse, urethral pain, urethral swelling or urethral lesion.      Vagina: Normal. No signs of injury and foreign body. No vaginal discharge, erythema, tenderness or bleeding.      Cervix: Normal.      Uterus: Normal.       Adnexa:         Right: No mass, tenderness or fullness.          Left: No mass, tenderness or fullness.        Rectum: No external hemorrhoid.      Comments: RV  Vaginal tone 3/5  Musculoskeletal:         General: Normal range of motion.      Cervical back: Normal range of motion and neck supple.   Lymphadenopathy:      Head:      Right side of head: No submental, submandibular or tonsillar adenopathy.      Left side of head: No submental, submandibular or tonsillar adenopathy.      Cervical: No cervical adenopathy.      Upper Body:      Right upper " body: No supraclavicular or axillary adenopathy.      Left upper body: No supraclavicular or axillary adenopathy.      Lower Body: No right inguinal adenopathy. No left inguinal adenopathy.   Skin:     General: Skin is warm and dry.   Neurological:      Mental Status: She is alert and oriented to person, place, and time.   Psychiatric:         Mood and Affect: Mood normal.         Behavior: Behavior normal.

## 2025-03-03 NOTE — PATIENT INSTRUCTIONS
Calcium 6169-2029 mg (in divided doses-max 600 mg at one time) + 600-1000 IU Vit D daily.   Exercise 150-300 minutes per week minimum including weight bearing exercises.   Pap with high risk HPV Q 5 years, if normal.  Due 2027  Call your insurance company to verify coverage prior to completing any ordered tests.   Right breast diagnostic mammogram and ultrasound scheduled.   Annual mammogram ordered and monthly breast self exam recommended.    Colonoscopy- Due 2028         Kegels 20 times twice daily.   Silicone based lubricant with sex. (Use water based lubricant with condoms or sexual toys.)    Vaginal moisturizers twice weekly as needed.   Return to office in one year or sooner, if needed.

## 2025-03-13 ENCOUNTER — HOSPITAL ENCOUNTER (OUTPATIENT)
Dept: ULTRASOUND IMAGING | Facility: CLINIC | Age: 60
Discharge: HOME | End: 2025-03-13
Payer: COMMERCIAL

## 2025-03-13 ENCOUNTER — RESULTS FOLLOW-UP (OUTPATIENT)
Dept: SURGICAL ONCOLOGY | Facility: CLINIC | Age: 60
End: 2025-03-13

## 2025-03-13 ENCOUNTER — HOSPITAL ENCOUNTER (OUTPATIENT)
Dept: MAMMOGRAPHY | Facility: CLINIC | Age: 60
Discharge: HOME | End: 2025-03-13
Payer: COMMERCIAL

## 2025-03-13 VITALS — HEIGHT: 67 IN | WEIGHT: 129.2 LBS | BODY MASS INDEX: 20.28 KG/M2

## 2025-03-13 DIAGNOSIS — R92.8 ABNORMAL MAMMOGRAM: ICD-10-CM

## 2025-03-13 PROCEDURE — 77065 DX MAMMO INCL CAD UNI: CPT

## 2025-03-13 PROCEDURE — 76642 ULTRASOUND BREAST LIMITED: CPT

## 2025-03-13 PROCEDURE — G0279 TOMOSYNTHESIS, MAMMO: HCPCS

## 2025-03-19 ENCOUNTER — TELEPHONE (OUTPATIENT)
Age: 60
End: 2025-03-19

## 2025-03-19 ENCOUNTER — OFFICE VISIT (OUTPATIENT)
Dept: SURGICAL ONCOLOGY | Facility: CLINIC | Age: 60
End: 2025-03-19
Payer: COMMERCIAL

## 2025-03-19 VITALS
TEMPERATURE: 97.2 F | DIASTOLIC BLOOD PRESSURE: 68 MMHG | BODY MASS INDEX: 20.24 KG/M2 | HEART RATE: 72 BPM | OXYGEN SATURATION: 98 % | SYSTOLIC BLOOD PRESSURE: 120 MMHG | HEIGHT: 67 IN

## 2025-03-19 DIAGNOSIS — Z85.3 PERSONAL HISTORY OF ADENOCARCINOMA OF BREAST: ICD-10-CM

## 2025-03-19 DIAGNOSIS — Z92.3 S/P RADIATION THERAPY: ICD-10-CM

## 2025-03-19 DIAGNOSIS — R92.8 ABNORMAL MAMMOGRAM: ICD-10-CM

## 2025-03-19 DIAGNOSIS — Z08 ENCOUNTER FOR FOLLOW-UP EXAMINATION AFTER COMPLETED TREATMENT FOR MALIGNANT NEOPLASM: Primary | ICD-10-CM

## 2025-03-19 DIAGNOSIS — R92.30 DENSE BREAST TISSUE: ICD-10-CM

## 2025-03-19 DIAGNOSIS — Z80.3 FAMILY HISTORY OF BREAST CANCER: ICD-10-CM

## 2025-03-19 PROCEDURE — 99243 OFF/OP CNSLTJ NEW/EST LOW 30: CPT

## 2025-03-19 NOTE — TELEPHONE ENCOUNTER
Patient calling.    Stuck in traffic [id number], will be few minutes late.    Seeing Marguerite Connell 8:30am

## 2025-03-19 NOTE — PROGRESS NOTES
Name: Deyanira Jacobo      : 1965      MRN: [id number]  Encounter Provider: LUCIANA Bloom  Encounter Date: 3/19/2025   Encounter department: [facility] [place]  :  Assessment & Plan  Encounter for follow-up examination after completed treatment for malignant neoplasm  Ms. Deyanira Jacobo is a 59 y.o. female presenting today for 1 year follow up of left breast cancer, diagnosed in 2016. She is s/p left lumpectomy with SLNB on 2017 with Dr. Payton Mcmillan. Surgical pathology demonstrated invasive ductal carcinoma ER/SC+, and HER-2 negative, 0/3 lymph nodes positive. Adjuvant radiation was completed with Dr. Maddox. She has completed 5 years of hormone therapy with Tamoxifen. Genetic testing was negative for pathogenic variants, (BRCA1/2) via [employer] in .    Her last bilateral screening mammogram was on 2025, which demonstrated BI-RADS 0, category 3 density (Heterogeneously dense). Right diagnostic mammogram and US was recommended due to an asymmetry. This was completed on 2025, demonstrating BI-RADS 3. Recommendation for right diagnostic mammogram in 6 months, due 2025. Script provided today. Of note, pt continues with breast MRI annually due to breast density. I discussed this with Ms. Jacobo today, including the fact that this is not routinely recommended and automated breast ultrasound (ABUS) may be more fitting. She prefers to continue with MRI. Script provided for breast MRI screening in September.    I discussed the option for expanded panel genetic testing, as pt completed testing in  only for BRCA1/2. Pt is in agreement given her sister's young breast cancer diagnosis as well. Referral placed.     I offered referral to cardio oncology given hx of left-sided radiation therapy, however pt defers at this time.    There were no concerning findings upon clinical breast exam (CBE) today. I discussed proper intervals between other  oncology providers, ideally every 3-4 months. I will see the patient back in 9 months, then annually thereafter or sooner should the need arise. She was instructed to call with any questions or concerns prior to this time. All questions were answered today.   Orders:  •  Ambulatory Referral to Oncology Genetics; Future  •  MRI breast bilateral w and wo contrast w cad; Future  •  Mammo diagnostic right w 3d and cad; Future    Abnormal mammogram  Orders:  •  Mammo diagnostic right w 3d and cad; Future    Dense breast tissue  Orders:  •  MRI breast bilateral w and wo contrast w cad; Future      History of Present Illness   Deyanira Jacobo is a 59 y.o. year old female who presents for today for 1 year follow up of left breast cancer, diagnosed in November 2016. She reports overall feeling well, is without any new breast concerns today. She was very relieved by her recent diagnostic imaging. She feels comforted by recent breast MRI that was without abnormal findings. She would like to continue with breast MRI annually. She reports she had genetic testing prior to her diagnosis in 2011, due to her sister's premenopausal breast cancer diagnosis. She has 2 girls in which her results would be important for. She is agreeable to genetics consult. She denies any family hx of CVD. She defers cardio oncology at this time as she is without cardiovascular s/s or concerns. She denies breast changes, persistent cough, SOB, headaches, as well as any new or persistent back, bone, or abdominal pain.      Oncology History   Cancer Staging   Personal history of adenocarcinoma of breast  Staging form: Breast, AJCC 7th Edition  - Clinical: Stage IA (T1c, N0, M0) - Signed by Myra Maddox MD on 5/7/2018  Laterality: Left  Histologic grade (G): G1  Estrogen receptor status: Positive  Progesterone receptor status: Positive  HER2 status: Negative  - Pathologic: Stage IA (T1c, N0, cM0) - Signed by Payton Mcmillan MD on 6/4/2018  Laterality:  "Left  Method of lymph node assessment: [place] lymph node biopsy  Histologic grade (G): G1  Estrogen receptor status: Positive  Percentage of positive estrogen receptors (%): 100  Progesterone receptor status: Positive  Percentage of positive progesterone receptors (%): 100  HER2 status: Negative  Multi-gene signature score: 10  Oncology History   Personal history of adenocarcinoma of breast   3/2011 Genetic Testing    [employer] BRCA1, BRCA2  Negative     11/17/2016 Biopsy    Left breast biopsy:  - Invasive ductal carcinoma  Grade 1  %  %  HER2 negative     1/3/2017 Surgery    Left lumpectomy and sentinel node biopsy  - clear margins  - 0/3 lymph nodes  Stage IA - pT1c, pN0    Dr. Mcmillan     2017 Genomic Testing    Oncotype DX: 10     2/2017 - 3/2022 Hormone Therapy    Tamoxifen   Dr. Yarbrough     3/13/2017 - 4/24/2017 Radiation    left breast to a dose of 5000 cGy followed by an additional 1000 cGy to the primary site  Dr. Maddox        Review of Systems   Constitutional:  Negative for activity change, appetite change, fatigue, fever and unexpected weight change.   Respiratory:  Negative for cough and shortness of breath.    Gastrointestinal:  Negative for abdominal pain.   Musculoskeletal:  Negative for back pain.   Skin: Negative.    Neurological: Negative.    Psychiatric/Behavioral:  Negative for confusion.     A complete review of systems is negative other than that noted above in the HPI.       Objective   /68 (BP Location: Right arm, Patient Position: Sitting, Cuff Size: Standard)   Pulse 72   Temp (!) 97.2 °F (36.2 °C) (Temporal)   Ht 5' 7\" (1.702 m)   LMP  (LMP Unknown)   SpO2 98%   BMI 20.24 kg/m²       Physical Exam  Vitals and nursing note reviewed.   Constitutional:       Appearance: Normal appearance. She is normal weight.   Eyes:      General: No scleral icterus.     Conjunctiva/sclera: Conjunctivae normal.   Cardiovascular:      Rate and Rhythm: Normal rate and regular rhythm. "   Pulmonary:      Effort: Pulmonary effort is normal.      Breath sounds: Normal breath sounds.   Chest:      Chest wall: No mass.   Breasts:     Right: No swelling, bleeding, inverted nipple, mass, nipple discharge, skin change or tenderness.      Left: Skin change (surgical scar s/p left lumpectomy) present. No swelling, bleeding, inverted nipple, mass, nipple discharge or tenderness.   Lymphadenopathy:      Upper Body:      Right upper body: No supraclavicular or axillary adenopathy.      Left upper body: No supraclavicular or axillary adenopathy.   Skin:     General: Skin is warm and dry.   Neurological:      General: No focal deficit present.      Mental Status: She is alert and oriented to person, place, and time. Mental status is at baseline.   Psychiatric:         Mood and Affect: Mood normal.         Behavior: Behavior normal.         Thought Content: Thought content normal.         Judgment: Judgment normal.          I have spent a total time of 36 minutes in caring for this patient on the day of the visit/encounter including Diagnostic results, Risks and benefits of tx options, Instructions for management, Patient and family education, Importance of tx compliance, Impressions, Counseling / Coordination of care, Documenting in the medical record, Reviewing/placing orders in the medical record (including tests, medications, and/or procedures), and Obtaining or reviewing history  .

## 2025-04-01 ENCOUNTER — TELEPHONE (OUTPATIENT)
Dept: MAMMOGRAPHY | Facility: CLINIC | Age: 60
End: 2025-04-01

## 2025-04-25 ENCOUNTER — OFFICE VISIT (OUTPATIENT)
Dept: HEMATOLOGY ONCOLOGY | Facility: CLINIC | Age: 60
End: 2025-04-25
Payer: COMMERCIAL

## 2025-04-25 ENCOUNTER — DOCUMENTATION (OUTPATIENT)
Dept: HEMATOLOGY ONCOLOGY | Facility: CLINIC | Age: 60
End: 2025-04-25

## 2025-04-25 VITALS
TEMPERATURE: 98.4 F | HEART RATE: 86 BPM | WEIGHT: 125 LBS | OXYGEN SATURATION: 97 % | SYSTOLIC BLOOD PRESSURE: 116 MMHG | HEIGHT: 67 IN | BODY MASS INDEX: 19.62 KG/M2 | DIASTOLIC BLOOD PRESSURE: 70 MMHG | RESPIRATION RATE: 18 BRPM

## 2025-04-25 DIAGNOSIS — Z17.0 MALIGNANT NEOPLASM OF UPPER-OUTER QUADRANT OF LEFT BREAST IN FEMALE, ESTROGEN RECEPTOR POSITIVE (HCC): Primary | ICD-10-CM

## 2025-04-25 DIAGNOSIS — Z78.0 MENOPAUSE: ICD-10-CM

## 2025-04-25 DIAGNOSIS — C50.412 MALIGNANT NEOPLASM OF UPPER-OUTER QUADRANT OF LEFT BREAST IN FEMALE, ESTROGEN RECEPTOR POSITIVE (HCC): Primary | ICD-10-CM

## 2025-04-25 PROCEDURE — 99214 OFFICE O/P EST MOD 30 MIN: CPT | Performed by: INTERNAL MEDICINE

## 2025-04-25 NOTE — ASSESSMENT & PLAN NOTE
In January 2017 patient had left breast lumpectomy and sentinel lymph node sampling for  hormone receptor positive, HER-2 negative, G1, T1c, 1.4 cm, stage I invasive mammary carcinoma in the left breast..Oncotype score was 10. BRCA test was negative.   In February 2017 she was started on tamoxifen.  Patient was premenopausal at that time.  We had talked about ovarian suppression or BSO because results are better when combined with tamoxifen or AI. She wanted to stay on tamoxifen and go into menopause  naturally ..  Patient stopped taking tamoxifen in February or March 2022 after taking tamoxifen for 5 years.  She did not want to continue on tamoxifen and she did not want to be made postmenopausal and to have AI.  She did not like tamoxifen but stayed on it for 5 years.  She states tamoxifen was affecting her quality of life and she was having mood swings.  She feels much better now not being on tamoxifen.  She has no symptoms at present and even anxiety is much less.  She is postmenopausal now.  Last menstrual period was 2 years ago and FSH was 104.  Patient prefers to remain under surveillance.  She follows with her breast surgeon for examination and imaging studies and she gets mammography and MRI scan because of dense breasts.   Recently she was found to have rectocele and she is seeing a specialist.  She also wants to check with the rectal surgeon and I sent  a message to Dr. Peguero  Orders:    CBC and differential; Standing    Comprehensive metabolic panel; Standing    Cancer antigen 27.29; Standing

## 2025-04-25 NOTE — PROGRESS NOTES
Name: Deyanira Jacobo      : 1965      MRN: [id number]  Encounter Provider: Álvaro Yarbrough MD  Encounter Date: 2025   Encounter department: [facility] [place]  :  Assessment & Plan  Malignant neoplasm of upper-outer quadrant of left breast in female, estrogen receptor positive (HCC)  In 2017 patient had left breast lumpectomy and sentinel lymph node sampling for  hormone receptor positive, HER-2 negative, G1, T1c, 1.4 cm, stage I invasive mammary carcinoma in the left breast..Oncotype score was 10. BRCA test was negative.   In 2017 she was started on tamoxifen.  Patient was premenopausal at that time.  We had talked about ovarian suppression or BSO because results are better when combined with tamoxifen or AI. She wanted to stay on tamoxifen and go into menopause  naturally ..  Patient stopped taking tamoxifen in February or 2022 after taking tamoxifen for 5 years.  She did not want to continue on tamoxifen and she did not want to be made postmenopausal and to have AI.  She did not like tamoxifen but stayed on it for 5 years.  She states tamoxifen was affecting her quality of life and she was having mood swings.  She feels much better now not being on tamoxifen.  She has no symptoms at present and even anxiety is much less.  She is postmenopausal now.  Last menstrual period was 2 years ago and FSH was 104.  Patient prefers to remain under surveillance.  She follows with her breast surgeon for examination and imaging studies and she gets mammography and MRI scan because of dense breasts.   Recently she was found to have rectocele and she is seeing a specialist.  She also wants to check with the rectal surgeon and I sent  a message to Dr. Peguero  Orders:    CBC and differential; Standing    Comprehensive metabolic panel; Standing    Cancer antigen 27.29; Standing    Menopause  Patient is postmenopausal.  Not having postmenopausal symptoms.  Has  a rectocele.       Blood work every 6 months.  Visit in 1 year.  Patient remains in remission from breast cancer.  Goal is cure from breast cancer.  Patient is capable of self-care.  See diagnoses, orders and instructions above.  All discussed in detail.  Questions answered.  I suggested self breast examination, eating healthy foods, staying active but to avoid falls and trauma.  Suggested health screening tests. Patient to continue to follow-up with primary physician and other consultants.  Provided counseling and support.  I used a dictation device to dictate this note and there could be mistakes in my note and for that patient may contact my office.          History of Present Illness   Chief Complaint   Patient presents with    Follow-up   In January 2017 patient had left breast lumpectomy and sentinel lymph node sampling for  hormone receptor positive, HER-2 negative, G1, T1c, 1.4 cm, stage I invasive mammary carcinoma in the left breast..Oncotype score was 10. BRCA test was negative.   In February 2017 she was started on tamoxifen.  Patient was premenopausal at that time.  We had talked about ovarian suppression or BSO because results are better when combined with tamoxifen or AI. She wanted to stay on tamoxifen and go into menopause  naturally ..  Patient stopped taking tamoxifen in February or March 2022 after taking tamoxifen for 5 years.  She did not want to continue on tamoxifen and she did not want to be made postmenopausal and to have AI.  She did not like tamoxifen but stayed on it for 5 years.  She states tamoxifen was affecting her quality of life and she was having mood swings.  She feels much better now not being on tamoxifen.  She has no symptoms at present and even anxiety is much less.  She is postmenopausal now.  Last menstrual period was 2 years ago and FSH was 104.  Patient prefers to remain under surveillance.  She follows with her breast surgeon for examination and imaging studies and she  "gets mammography and MRI scan because of dense breasts.   Recently she was found to have rectocele and she is seeing a specialist.  She also wants to check with the rectal surgeon and I sent  a message to Dr. Peguero.  Not having postmenopausal symptoms     Pertinent Medical History   See details in HPI  04/25/25:      Review of Systems  Reviewed 12 systems.  Symptoms are as in HPI.  No fevers, chills, bleeding, bone pains, skin rash, weight loss, night sweats and no swelling of the ankles and no swollen glands.  Not unusually tired.  No arthritic symptoms.  No frequent or severe infections.  No other neurological, cardiac, pulmonary, GI and  symptoms other than listed in HPI.      Objective   /70 (BP Location: Right arm, Patient Position: Sitting, Cuff Size: Adult)   Pulse 86   Temp 98.4 °F (36.9 °C) (Temporal)   Resp 18   Ht 5' 7\" (1.702 m)   Wt 56.7 kg (125 lb)   LMP  (LMP Unknown)   SpO2 97%   BMI 19.58 kg/m²   My medical assistant Eloise was in the room with me during examination  Physical Exam  Constitutional:       General: She is not in acute distress.     Appearance: Normal appearance. She is not ill-appearing.   HENT:      Head: Normocephalic and atraumatic.      Mouth/Throat:      Comments: No thrush.  Eyes:      General: No scleral icterus.     Conjunctiva/sclera: Conjunctivae normal.   Cardiovascular:      Rate and Rhythm: Normal rate and regular rhythm.      Heart sounds: Normal heart sounds. No murmur heard.  Pulmonary:      Effort: Pulmonary effort is normal. No respiratory distress.      Breath sounds: Normal breath sounds. No rhonchi or rales.   Abdominal:      General: Abdomen is flat. There is no distension.      Palpations: Abdomen is soft. There is no mass.      Tenderness: There is no abdominal tenderness.      Comments: No ascites.    Musculoskeletal:         General: No swelling or tenderness. Normal range of motion.      Cervical back: Normal range of motion. No rigidity " or tenderness.      Right lower leg: No edema.      Left lower leg: No edema.      Comments: No calf tenderness.   Lymphadenopathy:      Cervical: No cervical adenopathy.      Upper Body:      Right upper body: No supraclavicular or axillary adenopathy.      Left upper body: No supraclavicular or axillary adenopathy.   Skin:     General: Skin is warm.      Coloration: Skin is not jaundiced or pale.      Findings: No bruising or rash.      Nails: There is no clubbing.   Neurological:      General: No focal deficit present.      Mental Status: She is alert and oriented to person, place, and time.      Motor: No weakness.      Coordination: Coordination normal.      Gait: Gait normal.   Psychiatric:         Mood and Affect: Mood normal.         Behavior: Behavior normal.         Thought Content: Thought content normal.     ECOG 0.  No lymphedema.    Labs: I have reviewed the following labs:  Results for orders placed or performed in visit on 02/17/25   CBC and differential   Result Value Ref Range    White Blood Cell Count 4.7 3.8 - 10.8 Thousand/uL    Red Blood Cell Count 4.45 3.80 - 5.10 Million/uL    Hemoglobin 13.4 11.7 - 15.5 g/dL    HCT 40.2 35.0 - 45.0 %    MCV 90.3 80.0 - 100.0 fL    MCH 30.1 27.0 - 33.0 pg    MCHC 33.3 32.0 - 36.0 g/dL    RDW 12.5 11.0 - 15.0 %    Platelet Count 247 140 - 400 Thousand/uL    SL AMB MPV 12.6 (H) 7.5 - 12.5 fL    Neutrophils (Absolute) 2,204 1,500 - 7,800 cells/uL    Lymphocytes (Absolute) 1,673 850 - 3,900 cells/uL    Monocytes (Absolute) 620 200 - 950 cells/uL    Eosinophils (Absolute) 150 15 - 500 cells/uL    Basophils ABS 52 0 - 200 cells/uL    Neutrophils 46.9 %    Lymphocytes 35.6 %    Monocytes 13.2 %    Eosinophils 3.2 %    Basophils PCT 1.1 %   Cancer antigen 27.29   Result Value Ref Range    CA 27.29 18 <38 U/mL     COMPREHENSIVE METABOLIC PANEL  Status: Final result        COMPREHENSIVE METABOLIC PANEL  Order: [id number]   Status: Final result       Next appt:  08/13/2025 at 09:00 AM in Genetics ([facility], )              Component  Ref Range & Units (hover) 2/17/25 11:09 AM 9/13/23 10:54 AM 1/26/23  5:39 PM 4/13/22 10:19 AM 8/27/21  1:50 PM 2/26/21  8:48 AM 8/29/20 12:17 PM   Glucose 76    82 R, CM  69 R, CM   Comment:             Fasting reference interval   BUN 13 12 R 22 R 12 R 16 R 15 R 15 R   Creatinine 0.63 0.73 R, CM 0.86 R, CM 0.72 R, CM 0.65 R, CM 0.69 R, CM 0.67 R, CM   eGFRcr 103 92 R 76 R 95 R 101 R 100 R 101 R   BUN/Creatinine Ratio SEE NOTE:         Comment:    Not Reported: BUN and Creatinine are within     reference range.         Sodium 139 137 R 140 R 140 R 136 R 140 R 140 R   Potassium 4.9 4.1 4.3 4.2 4.0 4.3 4.2   Chloride 102 99 R 106 R 107 R 104 R 106 R 106 R   Carbon Dioxide 29 29 R 30 R 31 R 27 R 32 R 30 R   Calcium 10 9.9 R 9.8 R 10.2 High  R 9.6 R 10.1 R 9.6 R   Protein, Total 7.3 7.3 R 7.3 R 7.3 R 7.3 R 7.6 R 7.1 R   ALBUMIN 4.6 4.5 R 4.0 R 4.3 R 3.7 R 4.1 R 3.8 R   Globulin 2.7         Albumin/Globulin Ratio 1.7         Total Bilirubin 0.6 0.96 R, CM 0.39 R, CM 0.75 R, CM 0.63 R, CM 0.68 R, CM 0.63 R, CM   Alkaline Phosphatase 68 56 R 65 R 60 R 57 R 65 R 41 Low  R   AST 23 28 R 21 R, CM 21 R, CM 19 R, CM 22 R, CM 19 R, CM   ALT 17 23 R, CM 30 R, CM 25 R, CM 30 R, CM 28 R, CM 27 R, CM           Mammo diagnostic right w 3d and cad  US breast right limited (diagnostic)  Status: Final result     PACS Images - GE     Show images for US breast right limited (diagnostic)  PACS Images - Sectra     Show images for US breast right limited (diagnostic)    US breast right limited (diagnostic): Result Notes     LUCIANA Singer  3/13/2025  3:59 PM EDT       Repeat imaging will be ordered at patient's OV with Marguerite on 3/19/25            Study Result    Narrative & Impression   DIAGNOSIS: Abnormal mammogram      TECHNIQUE:   Digital diagnostic mammography was performed. Computer Aided Detection (CAD) analyzed all applicable images.  Right  breast ultrasound was performed.      COMPARISONS: Prior breast imaging dated: 01/22/2025, 09/29/2024, 01/08/2024, 07/13/2023, 01/04/2023, 07/14/2022, 09/08/2021, 07/31/2021, 08/24/2020, 02/11/2020, 08/19/2019, 02/07/2019, 08/13/2018, 08/07/2017, 01/03/2017, 01/03/2017, 01/03/2017, 12/02/2016, 11/17/2016, 11/17/2016, 11/17/2016, 11/14/2016, 11/14/2016, 06/15/2016, and 05/18/2015     RELEVANT HISTORY:   Family Breast Cancer History: History of breast cancer in Sister, Paternal Aunt.  Family Medical History: Family medical history includes breast cancer in 2 relatives (paternal aunt, sister).   Personal History: Hormone history includes tamoxifen. Surgical history includes breast biopsy and lumpectomy. Medical history includes breast cancer.     RISK ASSESSMENT:   Tyrer-Cuzick risk assessment reporting was suppressed due to the patient's history and/or demographic factors.     TISSUE DENSITY:   The breasts are heterogeneously dense, which may obscure small masses.     INDICATION: Deyanira Jacobo is a 59 y.o. female presenting for abnormal mammogram.     FINDINGS:   Additional views, including spot compression views, demonstrate partial effacement of the screen detected right outer breast asymmetry on the craniocaudal view at posterior depth.  There are no new suspicious masses, grouped microcalcifications or areas of unexplained architectural distortion. The skin and nipple areolar complex are unremarkable.       Targeted sonogram was performed in the right breast from the 7-11 o'clock axis and demonstrated no sonographic correlate or suspicious abnormality.        IMPRESSION:  Right outer breast asymmetry, which partially effaces on spot compression views, and does not have a sonographic correlate, considered probably benign.  Recommend right breast diagnostic mammogram in 6 months for further evaluation.           ASSESSMENT/BI-RADS CATEGORY:  Right: 3 - Probably Benign  Overall: 3 - Probably Benign      RECOMMENDATION:       - Diagnostic mammogram in 6 months for the right breast.     Workstation ID: [id number]        Signed by:  Charbel Magana MD                 Imaging    US breast right limited (diagnostic) (Order: [id number]) - 3/13/2025

## 2025-04-28 ENCOUNTER — TELEPHONE (OUTPATIENT)
Dept: HEMATOLOGY ONCOLOGY | Facility: CLINIC | Age: 60
End: 2025-04-28

## 2025-04-28 NOTE — TELEPHONE ENCOUNTER
When patient was in the office on 4/25/2025 she mentioned about rectal prolapse and I told her I could check with Dr. Peguero and let her know.  I communicated with Dr. Peguero and he said his partner Dr. Ortega does this kind of evaluation and procedure.  I called patient to let her know and I left a message for her in her cell phone with my name and office phone number and asked for a return call and also I spelled the name of Dr. Ortega for her.  Patient is seeing a specialist, a urogynecologist in [place] for this problem.

## 2025-05-02 DIAGNOSIS — K62.3 RECTAL PROLAPSE: Primary | ICD-10-CM

## 2025-05-02 NOTE — PROGRESS NOTES
Patient called back.  We discussed.  She asked me to write referral to  colorectal surgeon.  Referral made.

## 2025-05-23 ENCOUNTER — TELEPHONE (OUTPATIENT)
Age: 60
End: 2025-05-23

## 2025-05-23 NOTE — TELEPHONE ENCOUNTER
"Patient is asking for her Diagnostic Mammo to be re coded to \"breast disorder\" due to her insurance not covering it as it is now.   "

## 2025-06-09 RX ORDER — ALPRAZOLAM 0.5 MG
TABLET ORAL
COMMUNITY
Start: 2025-04-29

## 2025-06-11 ENCOUNTER — OFFICE VISIT (OUTPATIENT)
Age: 60
End: 2025-06-11
Attending: INTERNAL MEDICINE
Payer: COMMERCIAL

## 2025-06-11 VITALS — BODY MASS INDEX: 18.68 KG/M2 | WEIGHT: 119 LBS | HEIGHT: 67 IN

## 2025-06-11 DIAGNOSIS — N81.6 RECTOCELE: Primary | ICD-10-CM

## 2025-06-11 PROCEDURE — 46600 DIAGNOSTIC ANOSCOPY SPX: CPT | Performed by: SURGERY

## 2025-06-11 PROCEDURE — 99243 OFF/OP CNSLTJ NEW/EST LOW 30: CPT | Performed by: SURGERY

## 2025-06-11 NOTE — PATIENT INSTRUCTIONS
Two procedures were discussed in the office today: sclerotherapy and rubber band ligation. These procedures are used to treat symptoms of bleeding internal hemorrhoids.

## 2025-06-20 NOTE — PROGRESS NOTES
Name: Deyanira Jacobo      : 1965      MRN: [id number]  Encounter Provider: Mara Ortega MD  Encounter Date: 2025   Encounter department: [facility]ON AND RECTAL [facility] [place]  :  Assessment & Plan  Rectocele    Orders:    Ambulatory Referral to Physical Therapy; Future      Assessment & Plan  1. Rectocele.  Upon examination, no evidence of rectal prolapse was observed. However, a small rectocele was identified, which could potentially contribute to the sensation of pressure during bowel movements. The patient has a strong sphincter muscle tone and intact sphincter muscles, reducing the risk of incontinence. Pelvic floor physical therapy is recommended to help with bowel evacuation and potentially avoid future problems. A referral for physical therapy will be provided.    2. Internal hemorrhoids.  The patient has very large internal hemorrhoids, which are likely the cause of her bleeding during bowel movements. The last colonoscopy report from around 2 years ago was normal, so there is no suspicion of more serious underlying conditions. Rubber band ligation and sclerotherapy were discussed as potential treatment options. Rubber band ligation involves placing a rubber band around the hemorrhoid to cut off its blood supply, causing it to fall off. Sclerotherapy involves injecting a sclerosing agent into the hemorrhoid to shrink it. Both procedures are relatively painless and can be performed in the office. The patient was informed that the extra tissue from the hemorrhoids is not causing her inability to fully evacuate, but rather, they are a sign of existing bowel movement issues.    PROCEDURE  Colonoscopy from 2 years ago was normal.      History of Present Illness   History of Present Illness  The patient is a 59-year-old female who presents for rectal prolapse.    She has been diagnosed with a vaginal prolapse, which is causing a backward displacement leading to rectal prolapse.  She experiences occasional protrusion of her anus during defecation, which she manually repositions. This does not occur with every bowel movement. The most recent episode was approximately two weeks ago, during which she was able to reposition the protrusion without difficulty. She reports no rectal eversion but notes that her anal opening enlarges to facilitate defecation. She also experiences minor bleeding, which she attributes to tearing. She has previously consulted a urogynecologist who suggested surgical intervention. She has undergone pelvic floor physical therapy and Kegel exercises for her vaginal prolapse about five years ago, which she found beneficial.    She reports occasional incomplete bowel evacuation. She has been managing her symptoms with low-salt V8 juice at night and a green juice from [employer], which have improved her bowel movements.    History obtained from: patient    Review of Systems   Constitutional:  Negative for chills and fever.   HENT:  Negative for ear pain and sore throat.    Eyes:  Negative for pain and visual disturbance.   Respiratory:  Negative for cough and shortness of breath.    Cardiovascular:  Negative for chest pain and palpitations.   Gastrointestinal:  Negative for abdominal pain and vomiting.   Genitourinary:  Negative for dysuria and hematuria.   Musculoskeletal:  Negative for arthralgias and back pain.   Skin:  Negative for color change and rash.   Neurological:  Negative for seizures and syncope.   All other systems reviewed and are negative.    Past Medical History   Past Medical History[1]  Past Surgical History[2]  Family History[3]   reports that she has been smoking cigarettes. She has never used smokeless tobacco. She reports current alcohol use of about 1.0 standard drink of alcohol per week. She reports that she does not use drugs.  Current Outpatient Medications   Medication Instructions    ALPRAZolam (XANAX) 0.5 mg tablet TAKE 1 TABLET (0.5 MG TOTAL) BY  "MOUTH 3 (THREE) TIMES A DAY AS NEEDED FOR ANXIETY.    Azelastine-Fluticasone 137-50 MCG/ACT SUSP 1 spray, Nasal, 2 times daily    Calcium 500 MG tablet 1 tablet, Daily    COLLAGEN PO 1 tablet, Daily    fexofenadine (ALLEGRA) 180 mg, Oral, Daily    Ibuprofen 200 MG CAPS As needed    MILK THISTLE PO 1 tablet, Daily    Multiple Vitamins-Minerals (MULTIVITAMIN ADULT PO) 1 tablet, Daily    polyethylene glycol (MIRALAX) 17 g, 2 times daily    Probiotic Product (PROBIOTIC-10 PO) Daily   Allergies[4]      Objective   Ht 5' 7\" (1.702 m)   Wt 54 kg (119 lb)   LMP  (LMP Unknown)   BMI 18.64 kg/m²      Physical Exam  Vitals and nursing note reviewed.   Constitutional:       General: She is not in acute distress.     Appearance: She is well-developed.   HENT:      Head: Normocephalic and atraumatic.     Eyes:      Conjunctiva/sclera: Conjunctivae normal.       Cardiovascular:      Rate and Rhythm: Normal rate and regular rhythm.      Heart sounds: No murmur heard.  Pulmonary:      Effort: Pulmonary effort is normal. No respiratory distress.      Breath sounds: Normal breath sounds.   Abdominal:      Palpations: Abdomen is soft.      Tenderness: There is no abdominal tenderness.   Genitourinary:     Comments: Normal external exam  Strong tone on digital anorectal exam with small palpable rectocele  Anoscopy showing internal hemorrhoids    Musculoskeletal:         General: No swelling.      Cervical back: Neck supple.     Skin:     General: Skin is warm and dry.      Capillary Refill: Capillary refill takes less than 2 seconds.     Neurological:      Mental Status: She is alert.     Psychiatric:         Mood and Affect: Mood normal.     Lower Endoscopy    Date/Time: 6/11/2025 10:20 AM    Performed by: Mara Ortega MD  Authorized by: Mara Ortega MD    Verbal consent obtained?: Yes    Risks and benefits: Risks, benefits and alternatives were discussed    Consent given by:  Patient  Patient identity " confirmed:  Verbally with patient and provided demographic data  Time out: Immediately prior to the procedure a time out was called    Patient sedated: No    Scope type:  Anoscope  External exam performed: Yes    Digital exam performed: Yes    Internal hemorrhoids: Yes          Results    Administrative Statements   I have spent a total time of 34 minutes in caring for this patient on the day of the visit/encounter including Diagnostic results, Prognosis, Risks and benefits of tx options, Instructions for management, Patient and family education, Importance of tx compliance, Risk factor reductions, Impressions, Counseling / Coordination of care, Documenting in the medical record, Reviewing/placing orders in the medical record (including tests, medications, and/or procedures), Obtaining or reviewing history  , and Communicating with other healthcare professionals .         [1]   Past Medical History:  Diagnosis Date    Abdominal pain, left lower quadrant     Anxiety     BRCA negative     Breast cancer (HCC)     Left    Common cold     Environmental and seasonal allergies     Exercises 5 to 6 times per week     Food allergy     scallops    Low back pain     PONV (postoperative nausea and vomiting)     Seasonal allergies     Use of tamoxifen (Nolvadex)     Vaginal discharge     Vaginal irritation     Vulvar burning     Vulvar irritation    [2]   Past Surgical History:  Procedure Laterality Date    APPENDECTOMY      BREAST BIOPSY Left 11/17/2016    U/S BX    COLONOSCOPY      MAMMO NEEDLE LOCALIZATION LEFT (ALL INC) Left 1/3/2017    [place] BLEPHAROPLASTY LOWER EYELID Bilateral 4/22/2022    Procedure: BLEPHAROPLASTY LOWER;  Surgeon: Mina Granda MD;  Location:  MAIN OR;  Service: Plastics    [place] BLEPHAROPLASTY UPPER EYELID W/EXCESSIVE SKIN Bilateral 4/22/2022    Procedure: BLEPHAROPLASTY UPPER;  Surgeon: Mina Granda MD;  Location:  MAIN OR;  Service: Plastics    WV BX/EXC LYMPH NODE OPEN SUPERFICIAL Left 1/3/2017     Procedure: BIOPSY LYMPH NODE SENTINEL @ 1030 LYMPHOSCINTIGRAPHY,LYMPHATIC MAPPING;  Surgeon: Payton Mcmillan MD;  Location: [facility];  Service: Surgical Oncology    [place] MASTECTOMY PARTIAL Left 1/3/2017    Procedure: LUMPECTOMY BREAST NEEDLE LOCALIZED @ 0930;  Surgeon: Payton Mcmillan MD;  Location: [facility];  Service: Surgical Oncology    SENTINEL LYMPH NODE BIOPSY      US GUIDED BREAST BIOPSY LEFT COMPLETE Left 2016   [3]   Family History  Problem Relation Name Age of Onset    Lung cancer Mother          mother age 60    Lung cancer Father          father age 78    Breast cancer Sister          age dx unk    No Known Problems Brother      No Known Problems Maternal Grandmother      No Known Problems Maternal Grandfather      No Known Problems Paternal Grandmother      No Known Problems Paternal Grandfather      No Known Problems Daughter      No Known Problems Daughter      No Known Problems Maternal Aunt      No Known Problems Maternal Aunt      Breast cancer Paternal Aunt          aunt 48    Other Half-Brother          Born no spleen -  if blood infection    Colon cancer Neg Hx     [4] No Known Allergies

## (undated) DEVICE — SUT VICRYL 6-0 P-1 18 IN J489G

## (undated) DEVICE — LIGHT GLOVE GREEN

## (undated) DEVICE — BASIC PACK: Brand: CONVERTORS

## (undated) DEVICE — SYRINGE 30ML LL

## (undated) DEVICE — SUT PROLENE 6-0 P-3 18 IN 8695G

## (undated) DEVICE — 3M™ STERI-STRIP™ REINFORCED ADHESIVE SKIN CLOSURES, R1547, 1/2 IN X 4 IN (12 MM X 100 MM), 6 STRIPS/ENVELOPE: Brand: 3M™ STERI-STRIP™

## (undated) DEVICE — INTENDED FOR TISSUE SEPARATION, AND OTHER PROCEDURES THAT REQUIRE A SHARP SURGICAL BLADE TO PUNCTURE OR CUT.: Brand: BARD-PARKER SAFETY BLADES SIZE 15, STERILE

## (undated) DEVICE — ELECTRODE NEEDLE MOD E-Z CLEAN 2.75IN 7CM -0013M

## (undated) DEVICE — STERILE POLYISOPRENE POWDER-FREE SURGICAL GLOVES: Brand: PROTEXIS

## (undated) DEVICE — DISPOSABLE OR TOWEL: Brand: CARDINAL HEALTH

## (undated) DEVICE — 1820 FOAM BLOCK NEEDLE COUNTER: Brand: DEVON

## (undated) DEVICE — SUT SILK 2-0 SH 30 IN K833H

## (undated) DEVICE — MEDI-VAC YANKAUER SUCTION HANDLE W/BULBOUS AND CONTROL VENT: Brand: CARDINAL HEALTH

## (undated) DEVICE — GAUZE SPONGES,16 PLY: Brand: CURITY

## (undated) DEVICE — SYRINGE 10ML LL

## (undated) DEVICE — SPONGE 4 X 4 XRAY 16 PLY STRL LF RFD

## (undated) DEVICE — DRAPE PROBE NEO-PROBE/ULTRASOUND

## (undated) DEVICE — 2000CC GUARDIAN II: Brand: GUARDIAN

## (undated) DEVICE — ICE PACK EYE

## (undated) DEVICE — LIGACLIP MCA MULTIPLE CLIP APPLIERS, 20 SMALL CLIPS: Brand: LIGACLIP

## (undated) DEVICE — PENCIL ELECTROSURG E-Z CLEAN -0035H

## (undated) DEVICE — GLOVE SRG BIOGEL 6

## (undated) DEVICE — GLOVE SRG LF STRL BGL SKNSNS 6.5 PF

## (undated) DEVICE — NEEDLE 25G X 1 1/2

## (undated) DEVICE — REM POLYHESIVE ADULT PATIENT RETURN ELECTRODE: Brand: VALLEYLAB

## (undated) DEVICE — TIBURON SPLIT SHEET: Brand: CONVERTORS

## (undated) DEVICE — SUT MONOCRYL 3-0 SH 27 IN Y416H

## (undated) DEVICE — STANDARD SURGICAL GOWN, L: Brand: CONVERTORS

## (undated) DEVICE — ADHESIVE SKN CLSR HISTOACRYL FLEX 0.5ML LF

## (undated) DEVICE — SMOKE EVAC BOVIE PENCIL BUTTON

## (undated) DEVICE — INTENDED FOR TISSUE SEPARATION, AND OTHER PROCEDURES THAT REQUIRE A SHARP SURGICAL BLADE TO PUNCTURE OR CUT.: Brand: BARD-PARKER ® SAFETYLOCK CARBON RIB-BACK BLADES

## (undated) DEVICE — TUBING SUCTION 5MM X 12 FT

## (undated) DEVICE — CHLORAPREP HI-LITE 26ML ORANGE

## (undated) DEVICE — STRL UNIVERSAL MINOR GENERAL: Brand: CARDINAL HEALTH

## (undated) DEVICE — SKIN MARKER DUAL TIP WITH RULER CAP, FLEXIBLE RULER AND LABELS: Brand: DEVON

## (undated) DEVICE — SUT CHROMIC 6-0 790G

## (undated) DEVICE — SUPER SPONGES,MEDIUM: Brand: KERLIX

## (undated) DEVICE — SCD SEQUENTIAL COMPRESSION COMFORT SLEEVE MEDIUM KNEE LENGTH: Brand: KENDALL SCD

## (undated) DEVICE — TELFA NON-ADHERENT ABSORBENT DRESSING: Brand: TELFA

## (undated) DEVICE — SUT MONOCRYL 4-0 PS-2 27 IN Y426H

## (undated) DEVICE — BRA SURGICAL SZ SMALL (30-33)

## (undated) DEVICE — 3M™ STERI-STRIP™ COMPOUND BENZOIN TINCTURE 40 BAGS/CARTON 4 CARTONS/CASE C1544: Brand: 3M™ STERI-STRIP™

## (undated) DEVICE — NEEDLE BLUNT 18 G X 1 1/2IN